# Patient Record
Sex: MALE | Race: BLACK OR AFRICAN AMERICAN | Employment: FULL TIME | URBAN - METROPOLITAN AREA
[De-identification: names, ages, dates, MRNs, and addresses within clinical notes are randomized per-mention and may not be internally consistent; named-entity substitution may affect disease eponyms.]

---

## 2018-07-24 ENCOUNTER — OFFICE VISIT (OUTPATIENT)
Dept: FAMILY MEDICINE CLINIC | Age: 38
End: 2018-07-24

## 2018-07-24 ENCOUNTER — HOSPITAL ENCOUNTER (OUTPATIENT)
Dept: LAB | Age: 38
Discharge: HOME | End: 2018-07-24

## 2018-07-24 VITALS
BODY MASS INDEX: 29.06 KG/M2 | HEART RATE: 72 BPM | TEMPERATURE: 98 F | RESPIRATION RATE: 14 BRPM | WEIGHT: 203 LBS | DIASTOLIC BLOOD PRESSURE: 82 MMHG | SYSTOLIC BLOOD PRESSURE: 116 MMHG | HEIGHT: 70 IN | OXYGEN SATURATION: 98 %

## 2018-07-24 DIAGNOSIS — G47.30 SLEEP APNEA, UNSPECIFIED TYPE: ICD-10-CM

## 2018-07-24 DIAGNOSIS — Z83.3 FAMILY HISTORY OF DIABETES MELLITUS: ICD-10-CM

## 2018-07-24 DIAGNOSIS — Z00.00 ROUTINE MEDICAL EXAM: Primary | ICD-10-CM

## 2018-07-24 DIAGNOSIS — Z13.1 SCREENING FOR DIABETES MELLITUS: ICD-10-CM

## 2018-07-24 DIAGNOSIS — Z13.220 SCREENING CHOLESTEROL LEVEL: ICD-10-CM

## 2018-07-24 PROCEDURE — 99001 SPECIMEN HANDLING PT-LAB: CPT | Performed by: FAMILY MEDICINE

## 2018-07-24 NOTE — PROGRESS NOTES
Chief Complaint   Patient presents with    Physical    Other     h/o elevated CPK    Allergic Rhinitis     TDAP: UTD per patient    1. Have you been to the ER, urgent care clinic since your last visit? Hospitalized since your last visit? No    2. Have you seen or consulted any other health care providers outside of the [address] [place] [address] since your last visit? Include any pap smears or colon screening.  No

## 2018-07-24 NOTE — PROGRESS NOTES
Chief Complaint   Patient presents with    Physical    Other     h/o elevated CPK    Allergic Rhinitis     Subjective:   Su Andujar is a 41 y.o. male presenting for his annual checkup. ROS:  Feeling well. No dyspnea or chest pain on exertion. No abdominal pain, change in bowel habits, black or bloody stools. No urinary tract or prostatic symptoms. No neurological complaints. No myalgias. Has a desk job mainly. Specific concerns today:   No concerns. No longer on meds. Says that prior symptoms and work-up from 2016 have resolved essentially. Current Outpatient Prescriptions   Medication Sig Dispense Refill    cpap machine kit by Does Not Apply route.  fluticasone (FLONASE) 50 mcg/actuation nasal spray 2 Sprays by Both Nostrils route daily. 1 Bottle 11     No Known Allergies  Past Medical History:   Diagnosis Date    Allergic rhinitis     Body mass index (BMI) of 25.0 to 29.9     DJD (degenerative joint disease), cervical 2016    MRI confirmed, foramenal stenosis     History of echocardiogram 07/22/2014    EF 60%. No RWMA. RVSP 25 mmHg.  Left lower lobe pneumonia ([facility] [address] 2013    Sleep apnea     CPAP use at times     History reviewed. No pertinent surgical history.   Family History   Problem Relation Age of Onset    Hypertension Maternal Grandmother     Diabetes Maternal Grandmother     Breast Cancer Maternal Grandmother     Other Mother      DVT     Social History   Substance Use Topics    Smoking status: Former Smoker     Packs/day: 0.10     Years: 15.00     Types: Cigarettes    Smokeless tobacco: Never Used      Comment: 2007    Alcohol use 2.0 - 2.5 oz/week     4 - 5 Standard drinks or equivalent per week      Objective:     Visit Vitals    /82 (BP 1 Location: Left arm, BP Patient Position: Sitting)    Pulse 72    Temp 98 °F (36.7 °C) (Oral)    Resp 14    Ht 5' 10\" (1.778 m)    Wt 203 lb (92.1 kg)    SpO2 98%    BMI 29.13 kg/m2     The patient appears well, alert, oriented x 3, in no distress. ENT normal.  Neck supple. No adenopathy or thyromegaly. RACHAEL. Lungs are clear, good air entry, no wheezes, rhonchi or rales. S1 and S2 normal, no murmurs, regular rate and rhythm. Abdomen is soft without tenderness, guarding, mass or organomegaly. Extremities show no edema, normal peripheral pulses. Neurological is normal without focal findings. Psych: normal affect. Mood good. Oriented x 3. Judgement and insight intact. Assessment/Plan:       ICD-10-CM ICD-9-CM    1. Routine medical exam Z00.00 V70.0    2. Screening cholesterol level Z13.220 V77.91 LIPID PANEL   3. Sleep apnea, unspecified type G47.30 780.57    4. Screening for diabetes mellitus Z13.1 V77.1 GLUCOSE, RANDOM   5. Family history of diabetes mellitus Z83.3 V18.0 GLUCOSE, RANDOM   6. Body mass index (BMI) of 25.0 to 29.9 E66.3 278.02 GLUCOSE, RANDOM     Age and sex specific counseling. Pt ed handout. Screening labs ordered. Diet and exercise for BMI>25. All chart history elements were reviewed by me at the time of the visit even though marked at time of note closure. Patient understands our medical plan. Patient has provided input and agrees with goals. Alternatives have been explained and offered. All questions answered. The patient is to call if condition worsens or fails to improve. Follow-up Disposition:  Return in about 1 year (around 7/24/2019) for physical. Labs due today.

## 2018-07-24 NOTE — MR AVS SNAPSHOT
[facility] [address] [facility] 
[phone] Patient: Haroldo Solis MRN: [id number] KER:[id number] Visit Information Date & Time Provider Department Dept. Phone Encounter #  
 7/24/2018 10:45 AM Vanessa Schreiber, [address] [id number] Follow-up Instructions Return in about 1 year (around 7/24/2019) for physical. Labs due today. Upcoming Health Maintenance Date Due DTaP/Tdap/Td series (1 - Tdap) 3/27/2002 Influenza Age 5 to Adult 8/1/2018 Allergies as of 7/24/2018  Review Complete On: 7/24/2018 By: Mike Martínez LPN No Known Allergies Current Immunizations  Reviewed on 10/12/2016 No immunizations on file. Not reviewed this visit You Were Diagnosed With   
  
 Codes Comments Screening cholesterol level    -  Primary ICD-10-CM: K71.116 ICD-9-CM: V77.91 Sleep apnea, unspecified type     ICD-10-CM: G47.30 ICD-9-CM: 780.57 Routine medical exam     ICD-10-CM: Z00.00 ICD-9-CM: V70.0 Screening for diabetes mellitus     ICD-10-CM: Z13.1 ICD-9-CM: V77.1 Family history of diabetes mellitus     ICD-10-CM: Z83.3 ICD-9-CM: V18.0 Body mass index (BMI) of 25.0 to 29.9     ICD-10-CM: E66.3 ICD-9-CM: 278.02 Vitals BP Pulse Temp Resp Height(growth percentile) Weight(growth percentile) 116/82 (BP 1 Location: Left arm, BP Patient Position: Sitting) 72 98 °F (36.7 °C) (Oral) 14 5' 10\" (1.778 m) 203 lb (92.1 kg) SpO2 BMI Smoking Status 98% 29.13 kg/m2 Former Smoker Vitals History BMI and BSA Data Body Mass Index Body Surface Area  
 29.13 kg/m 2 2.13 m 2 Preferred Pharmacy Pharmacy Name Phone [address] [place] [address] [phone] Your Updated Medication List  
  
   
This list is accurate as of 7/24/18 11:12 AM.  Always use your most recent med list.  
  
  
  
  
 cpap machine kit  
by Does Not Apply route.  
  
 ergocalciferol 50,000 unit capsule Commonly known as:  VITAMIN D2 Take 1 Cap by mouth every seven (7) days. fluticasone 50 mcg/actuation nasal spray Commonly known as:  Jennifer Shames 2 Sprays by Both Nostrils route daily. gabapentin 100 mg capsule Commonly known as:  NEURONTIN  
1 twice a day for 2 days then 1 three times a day for three days then 2 three times a day for 7 days then 3 three times a day HYDROcodone-acetaminophen 5-325 mg per tablet Commonly known as:  Yulissa Hernandez Take 1 Tab by Mouth Every 4 Hours As Needed. meloxicam 15 mg tablet Commonly known as:  MOBIC Take 1 Tab by mouth daily (with breakfast). metaxalone 800 mg tablet Commonly known as:  SKELAXIN Take 1 Tab by mouth three (3) times daily. naproxen 375 mg tablet Commonly known as:  NAPROSYN  
375 mg. VALIUM 5 mg tablet Generic drug:  diazePAM  
5 mg. Follow-up Instructions Return in about 1 year (around 7/24/2019) for physical. Labs due today. To-Do List   
 07/24/2018 Lab:  GLUCOSE, RANDOM   
  
 07/24/2018 Lab:  LIPID PANEL Introducing [facility]! Anirudh Zambrano introduces [employer] patient portal. Now you can access parts of your medical record, email your doctor's office, and request medication refills online. 1. In your internet browser, go to [email or web address] 2. Click on the First Time User? Click Here link in the Sign In box. You will see the New Member Sign Up page. 3. Enter your [employer] Access Code exactly as it appears below. You will not need to use this code after youve completed the sign-up process. If you do not sign up before the expiration date, you must request a new code. · [employer] Access Code: [email or web address] Expires: 10/22/2018 11:05 AM 
 
4.  Enter the last four digits of your Social Security Number (xxxx) and Date of Birth (mm/dd/yyyy) as indicated and click Submit. You will be taken to the next sign-up page. 5. Create a 5to1 ID. This will be your 5to1 login ID and cannot be changed, so think of one that is secure and easy to remember. 6. Create a 5to1 password. You can change your password at any time. 7. Enter your Password Reset Question and Answer. This can be used at a later time if you forget your password. 8. Enter your e-mail address. You will receive e-mail notification when new information is available in [address] 9. Click Sign Up. You can now view and download portions of your medical record. 10. Click the Download Summary menu link to download a portable copy of your medical information. If you have questions, please visit the Frequently Asked Questions section of the 5to1 website. Remember, 5to1 is NOT to be used for urgent needs. For medical emergencies, dial 911. Now available from your iPhone and Android! Please provide this summary of care documentation to your next provider. Your primary care clinician is listed as Flynn Graham. If you have any questions after today's visit, please call [phone].

## 2018-07-25 LAB
CHOLEST SERPL-MCNC: 75 MG/DL (ref 100–199)
GLUCOSE SERPL-MCNC: 79 MG/DL (ref 65–99)
HDLC SERPL-MCNC: 41 MG/DL
INTERPRETATION, 910389: NORMAL
LDLC SERPL CALC-MCNC: 17 MG/DL (ref 0–99)
TRIGL SERPL-MCNC: 85 MG/DL (ref 0–149)
VLDLC SERPL CALC-MCNC: 17 MG/DL (ref 5–40)

## 2018-07-26 NOTE — PROGRESS NOTES
[place] [phone] advising patient that labs look great. He was also advised that forms have been faxed and copy has been mailed to him. Patient was asked to call [facility] if he has any questions.

## 2019-09-04 ENCOUNTER — OFFICE VISIT (OUTPATIENT)
Dept: FAMILY MEDICINE CLINIC | Age: 39
End: 2019-09-04

## 2019-09-04 VITALS
WEIGHT: 206 LBS | OXYGEN SATURATION: 97 % | HEIGHT: 70 IN | HEART RATE: 84 BPM | RESPIRATION RATE: 16 BRPM | SYSTOLIC BLOOD PRESSURE: 118 MMHG | BODY MASS INDEX: 29.49 KG/M2 | TEMPERATURE: 98.4 F | DIASTOLIC BLOOD PRESSURE: 82 MMHG

## 2019-09-04 DIAGNOSIS — Z13.1 SCREENING FOR DIABETES MELLITUS: ICD-10-CM

## 2019-09-04 DIAGNOSIS — Z83.3 FAMILY HISTORY OF DIABETES MELLITUS: ICD-10-CM

## 2019-09-04 DIAGNOSIS — Z00.00 ROUTINE MEDICAL EXAM: Primary | ICD-10-CM

## 2019-09-04 DIAGNOSIS — Z63.4 BEREAVEMENT: ICD-10-CM

## 2019-09-04 DIAGNOSIS — G47.30 SLEEP APNEA, UNSPECIFIED TYPE: ICD-10-CM

## 2019-09-04 DIAGNOSIS — Z13.220 SCREENING CHOLESTEROL LEVEL: ICD-10-CM

## 2019-09-04 SDOH — SOCIAL STABILITY - SOCIAL INSECURITY: DISSAPEARANCE AND DEATH OF FAMILY MEMBER: Z63.4

## 2019-09-04 NOTE — PROGRESS NOTES
1. Have you been to the ER, urgent care clinic since your last visit? Hospitalized since your last visit? No    2. Have you seen or consulted any other health care providers outside of the [address] [place] [address] since your last visit? Include any pap smears or colon screening.  No

## 2019-09-04 NOTE — PATIENT INSTRUCTIONS
Grief (Actual/Anticipated): Care Instructions  Your Care Instructions    Grief is your emotional reaction to a major loss. The words \"sorrow\" and \"heartache\" often are used to describe feelings of grief. You feel grief when you lose a beloved person, pet, place, or thing. It is also natural to feel grief when you lose a valued way of life, such as a job, marriage, or good health. You may begin to grieve before a loss occurs. You may grieve for a loved one who is sick and dying. Children and adults often feel the pain of loss before a big move or divorce. This type of grief helps you get ready for a loss. Grief is different for each person. There is no \"normal\" or \"expected\" period of time for grieving. Some people adjust to their loss within a couple of months. Others may take 2 years or longer, especially if their lives were changed a lot or if the loss was sudden and shocking. Grieving can cause problems such as headaches, loss of appetite, and trouble with thinking or sleeping. You may withdraw from friends and family and behave in ways that are unusual for you. Grief may cause you to question your beliefs and views about life. Grief is natural and does not require medical treatment. But if you have trouble sleeping, it may help to take sleeping pills for a short time. It may help to talk with people who have been through or are going through similar losses. You may also want to talk to a counselor about your feelings. Talking about your loss, sharing your cares and concerns, and getting support from others are important parts of healthy grieving. Follow-up care is a key part of your treatment and safety. Be sure to make and go to all appointments, and call your doctor if you are having problems. It's also a good idea to know your test results and keep a list of the medicines you take. How can you care for yourself at home? · Get enough sleep. Your mind helps make sense of your life while you sleep. Missing sleep can lead to illness and make it harder for you to deal with your grief. · Eat healthy foods. Try to avoid eating only foods that give you comfort. Ask someone to join you for a meal if you do not like eating alone. Consider taking a multivitamin every day. · Get some exercise every day. Even a walk can help you deal with your grief. Other exercises, such as yoga, can also help you manage stress. · Comfort yourself. Take time to look at photos or use special items that make you feel better. · Stay involved in your life. Do not withdraw from the activities you enjoy. People you know at work, Islam, clubs, or other groups can help you get through your period of grief. · Think about joining a support group to help you deal with your grief. There are many support groups to help people recover from grief. When should you call for help? Call 911 anytime you think you may need emergency care. For example, call if:    · You feel you cannot stop from hurting yourself or someone else.    Watch closely for changes in your health, and be sure to contact your doctor if:    · You think you may be depressed.     · You do not get better as expected. Where can you learn more? Go to http://darren-anaid.info/. Enter H249 in the search box to learn more about \"Grief (Actual/Anticipated): Care Instructions. \"  Current as of: April 1, 2019  Content Version: 12.1  © 3706-8611 Healthwise, Incorporated. Care instructions adapted under license by [employer] (which disclaims liability or warranty for this information). If you have questions about a medical condition or this instruction, always ask your healthcare professional. [place] [address] any warranty or liability for your use of this information. Well Visit, Ages 25 to 48: Care Instructions  Your Care Instructions    Physical exams can help you stay healthy.  Your doctor has checked your overall health and may have suggested ways to take good care of yourself. He or she also may have recommended tests. At home, you can help prevent illness with healthy eating, regular exercise, and other steps. Follow-up care is a key part of your treatment and safety. Be sure to make and go to all appointments, and call your doctor if you are having problems. It's also a good idea to know your test results and keep a list of the medicines you take. How can you care for yourself at home? · Reach and stay at a healthy weight. This will lower your risk for many problems, such as obesity, diabetes, heart disease, and high blood pressure. · Get at least 30 minutes of physical activity on most days of the week. Walking is a good choice. You also may want to do other activities, such as running, swimming, cycling, or playing tennis or team sports. Discuss any changes in your exercise program with your doctor. · Do not smoke or allow others to smoke around you. If you need help quitting, talk to your doctor about stop-smoking programs and medicines. These can increase your chances of quitting for good. · Talk to your doctor about whether you have any risk factors for sexually transmitted infections (STIs). Having one sex partner (who does not have STIs and does not have sex with anyone else) is a good way to avoid these infections. · Use birth control if you do not want to have children at this time. Talk with your doctor about the choices available and what might be best for you. · Protect your skin from too much sun. When you're outdoors from 10 a.m. to 4 p.m., stay in the shade or cover up with clothing and a hat with a wide brim. Wear sunglasses that block UV rays. Even when it's cloudy, put broad-spectrum sunscreen (SPF 30 or higher) on any exposed skin. · See a dentist one or two times a year for checkups and to have your teeth cleaned. · Wear a seat belt in the car.   Follow your doctor's advice about when to have certain tests. These tests can spot problems early. For everyone  · Cholesterol. Have the fat (cholesterol) in your blood tested after age 21. Your doctor will tell you how often to have this done based on your age, family history, or other things that can increase your risk for heart disease. · Blood pressure. Have your blood pressure checked during a routine doctor visit. Your doctor will tell you how often to check your blood pressure based on your age, your blood pressure results, and other factors. · Vision. Talk with your doctor about how often to have a glaucoma test.  · Diabetes. Ask your doctor whether you should have tests for diabetes. · Colon cancer. Your risk for colorectal cancer gets higher as you get older. Some experts say that adults should start regular screening at age 48 and stop at age 76. Others say to start before age 48 or continue after age 76. Talk with your doctor about your risk and when to start and stop screening. For women  · Breast exam and mammogram. Talk to your doctor about when you should have a clinical breast exam and a mammogram. Medical experts differ on whether and how often women under 50 should have these tests. Your doctor can help you decide what is right for you. · Cervical cancer screening test and pelvic exam. Begin with a Pap test at age 24. The test often is part of a pelvic exam. Starting at age 27, you may choose to have a Pap test, an HPV test, or both tests at the same time (called co-testing). Talk with your doctor about how often to have testing. · Tests for sexually transmitted infections (STIs). Ask whether you should have tests for STIs. You may be at risk if you have sex with more than one person, especially if your partners do not wear condoms. For men  · Tests for sexually transmitted infections (STIs). Ask whether you should have tests for STIs.  You may be at risk if you have sex with more than one person, especially if you do not wear a condom. · Testicular cancer exam. Ask your doctor whether you should check your testicles regularly. · Prostate exam. Talk to your doctor about whether you should have a blood test (called a PSA test) for prostate cancer. Experts differ on whether and when men should have this test. Some experts suggest it if you are older than 39 and are -American or have a father or brother who got prostate cancer when he was younger than 72. When should you call for help? Watch closely for changes in your health, and be sure to contact your doctor if you have any problems or symptoms that concern you. Where can you learn more? Go to http://darren-anaid.info/. Enter P072 in the search box to learn more about \"Well Visit, Ages 25 to 48: Care Instructions. \"  Current as of: December 13, 2018  Content Version: 12.1  © 2537-8609 Healthwise, Incorporated. Care instructions adapted under license by [employer] (which disclaims liability or warranty for this information). If you have questions about a medical condition or this instruction, always ask your healthcare professional. [place] [address] any warranty or liability for your use of this information.     [address]  [place], [address]  [phone]

## 2019-09-04 NOTE — PROGRESS NOTES
Chief Complaint   Patient presents with    Physical     Subjective:   Cici Caldwell is a 46 y.o. male presenting for his annual checkup. ROS:  Feeling well. No dyspnea or chest pain on exertion. No abdominal pain, change in bowel habits, black or bloody stools. No urinary tract or prostatic symptoms. No neurological complaints. No myalgias. Has a desk job mainly. Specific concerns today:   No concerns besides some bereavement over the loss of family members, mainly his step father. He has thought about counseling. It is not as bad as before. No harmful ideations. Not interested in medications. Has been consuming more regular alcohol than before. Was a weekend drinker for fun and now consumes weekdays a few drinks. Current Outpatient Medications   Medication Sig Dispense Refill    fluticasone (FLONASE) 50 mcg/actuation nasal spray 2 Sprays by Both Nostrils route daily. 1 Bottle 11    cpap machine kit by Does Not Apply route. No Known Allergies  Past Medical History:   Diagnosis Date    Allergic rhinitis     Body mass index (BMI) of 25.0 to 29.9     DJD (degenerative joint disease), cervical 2016    MRI confirmed, foramenal stenosis     History of echocardiogram 07/22/2014    EF 60%. No RWMA. RVSP 25 mmHg.  Left lower lobe pneumonia ([address] 2013    Sleep apnea     CPAP use at times     History reviewed. No pertinent surgical history.   Family History   Problem Relation Age of Onset    Hypertension Maternal Grandmother     Diabetes Maternal Grandmother     Breast Cancer Maternal Grandmother     Other Mother         DVT     Social History     Tobacco Use    Smoking status: Former Smoker     Packs/day: 0.10     Years: 15.00     Pack years: 1.50     Types: Cigarettes    Smokeless tobacco: Never Used    Tobacco comment: 2007   Substance Use Topics    Alcohol use: Yes     Frequency: 4 or more times a week     Drinks per session: 1 or 2      Objective:     Visit Vitals  BP 118/82 (BP 1 Location: Left arm, BP Patient Position: Sitting)   Pulse 84   Temp 98.4 °F (36.9 °C) (Oral)   Resp 16   Ht 5' 10\" (1.778 m)   Wt 206 lb (93.4 kg)   SpO2 97%   BMI 29.56 kg/m²     The patient appears well, alert, oriented x 3, in no distress. ENT normal.  Neck supple. No adenopathy or thyromegaly. RACHAEL. Lungs are clear, good air entry, no wheezes, rhonchi or rales. S1 and S2 normal, no murmurs, regular rate and rhythm. Abdomen is soft without tenderness, guarding, mass or organomegaly. Extremities show no edema, normal peripheral pulses. Neurological is normal without focal findings. Psych: normal affect. Mood good. Oriented x 3. Judgement and insight intact. Assessment/Plan:       ICD-10-CM ICD-9-CM    1. Routine medical exam Z00.00 V70.0    2. Screening cholesterol level Z13.220 V77.91 LIPID PANEL   3. Sleep apnea, unspecified type G47.30 780.57    4. Screening for diabetes mellitus Z13.1 V77.1 GLUCOSE, RANDOM   5. Family history of diabetes mellitus Z83.3 V18.0 GLUCOSE, RANDOM   6. Body mass index (BMI) of 25.0 to 29.9 E66.3 278.02 GLUCOSE, RANDOM   7. Bereavement Z63.4 V62.80      Age and sex specific counseling. Pt ed handout. Screening labs ordered. Diet and exercise for BMI>25. Advised on counseling for bereavement. Pt ed handout. Declines flu vaccine. Says he has Tdap through work. All chart history elements were reviewed by me at the time of the visit even though marked at time of note closure. Patient understands our medical plan. Patient has provided input and agrees with goals. Alternatives have been explained and offered. All questions answered. The patient is to call if condition worsens or fails to improve.      Follow-up and Dispositions    · Return in about 1 year (around 9/4/2020) for physical.

## 2019-09-12 ENCOUNTER — HOSPITAL ENCOUNTER (OUTPATIENT)
Dept: LAB | Age: 39
Discharge: HOME | End: 2019-09-12

## 2019-09-12 LAB — XX-LABCORP SPECIMEN COL,LCBCF: NORMAL

## 2019-09-12 PROCEDURE — 99001 SPECIMEN HANDLING PT-LAB: CPT

## 2019-09-13 LAB
CHOLEST SERPL-MCNC: 81 MG/DL (ref 100–199)
GLUCOSE SERPL-MCNC: 93 MG/DL (ref 65–99)
HDLC SERPL-MCNC: 34 MG/DL
INTERPRETATION, 910389: NORMAL
LDLC SERPL CALC-MCNC: 24 MG/DL (ref 0–99)
SPECIMEN STATUS REPORT, ROLRST: NORMAL
TRIGL SERPL-MCNC: 116 MG/DL (ref 0–149)
VLDLC SERPL CALC-MCNC: 23 MG/DL (ref 5–40)

## 2019-09-13 NOTE — PROGRESS NOTES
Nurse to fill out work forms and fax and provide to patient. Also to advise patient they look overall good.

## 2019-09-16 ENCOUNTER — DOCUMENTATION ONLY (OUTPATIENT)
Dept: FAMILY MEDICINE CLINIC | Age: 39
End: 2019-09-16

## 2019-09-16 NOTE — PROGRESS NOTES
Forms completed 9/13/19 and faxed to employer. Марина Valle [phone] advising patient of normal labs. He was asked to call if there are any questions.

## 2019-09-27 ENCOUNTER — TELEPHONE (OUTPATIENT)
Dept: FAMILY MEDICINE CLINIC | Age: 39
End: 2019-09-27

## 2019-09-27 NOTE — TELEPHONE ENCOUNTER
Pt states that Dr Natasha Abbott had talked to him about anxiety medication and pt didn't want to start on it at that time. He now thinks he would like to try something and would like to know what Dr Natasha Abbott would put him on . Advised that Dr Natasha Abbott was out of the office today and this would be addressed on Monday. Please advise.

## 2019-10-01 NOTE — TELEPHONE ENCOUNTER
Please schedule and we can discuss options. We recommended counseling the last visit since he declined medication. Has he started that?

## 2019-10-02 NOTE — TELEPHONE ENCOUNTER
Pt states that he has set up an appt with [facility] on 12/12/2019 the earliest they had. He would like to know if Dr Zuleika Davey will give him something for his depression and anxiety. Ralph Watts [phone]  Please advise.

## 2019-10-02 NOTE — TELEPHONE ENCOUNTER
Spoke with Parag Cadena, states [facility] location they were not excepting any new patients at the time. He was given [place] location information ([phone]) to see if they had anything and that he will also call back to schedule with Dr. Aayush Mcfadden as well.

## 2019-10-02 NOTE — TELEPHONE ENCOUNTER
Left message for Mary that per Dr. Chong Raymundo an appointment is needed to discuss medication, counseling was recommended since he declined medication at last visit and Dr. Chong Raymundo would like to know Has he started that?

## 2019-10-03 RX ORDER — ESCITALOPRAM OXALATE 5 MG/1
5 TABLET ORAL DAILY
Qty: 30 TAB | Refills: 0 | Status: SHIPPED | OUTPATIENT
Start: 2019-10-03 | End: 2019-10-29 | Stop reason: SDUPTHER

## 2019-10-04 NOTE — TELEPHONE ENCOUNTER
Spoke with Shlomo Hernandez aware that Dr. Ryan Miranda has prescribed Lexapro 5 mg 1 tab daily and to follow up in 2 week.  Appointment scheduled for 10- at 3:30 pm

## 2019-10-04 NOTE — TELEPHONE ENCOUNTER
We can start lexapro 5mg daily. I will send to his pharmacy (or print) a 30 day supply. Please follow-up in 2 weeks to see how it is working and we can adjust from there.

## 2019-10-29 ENCOUNTER — OFFICE VISIT (OUTPATIENT)
Dept: FAMILY MEDICINE CLINIC | Age: 39
End: 2019-10-29

## 2019-10-29 VITALS
OXYGEN SATURATION: 97 % | TEMPERATURE: 97.9 F | HEIGHT: 70 IN | SYSTOLIC BLOOD PRESSURE: 110 MMHG | BODY MASS INDEX: 30.49 KG/M2 | WEIGHT: 213 LBS | DIASTOLIC BLOOD PRESSURE: 78 MMHG | HEART RATE: 75 BPM | RESPIRATION RATE: 14 BRPM

## 2019-10-29 DIAGNOSIS — F33.0 DEPRESSION, MAJOR, RECURRENT, MILD (HCC): Primary | ICD-10-CM

## 2019-10-29 DIAGNOSIS — G47.00 INSOMNIA, UNSPECIFIED TYPE: ICD-10-CM

## 2019-10-29 RX ORDER — TRAZODONE HYDROCHLORIDE 50 MG/1
50 TABLET ORAL
Qty: 30 TAB | Refills: 1 | Status: SHIPPED | OUTPATIENT
Start: 2019-10-29 | End: 2020-02-11 | Stop reason: SDUPTHER

## 2019-10-29 RX ORDER — ESCITALOPRAM OXALATE 5 MG/1
5 TABLET ORAL DAILY
Qty: 30 TAB | Refills: 1 | Status: SHIPPED | OUTPATIENT
Start: 2019-10-29 | End: 2020-01-24 | Stop reason: SDUPTHER

## 2019-10-29 NOTE — PROGRESS NOTES
SUBJECTIVE  Chief Complaint   Patient presents with    Anxiety    Depression     The patient presents complaining of ongoing depressed mood. The patient has had some difficulty concentrating and emotional outbursts. The patient reports their sleep is very poor. The patients appetite is up and down. The patient reports anhedonia. The patient reports they have had other times in their life when they have felt depressed. The patient denies any suicidal or homicidal ideation. Never got the lexapro he called to get started on earlier this month. Says that he did not know which pharmacy it went to. Scheduled for therapy Dec 12th (approx) at [facility]. Denies hallucinations. OBJECTIVE    Blood pressure 110/78, pulse 75, temperature 97.9 °F (36.6 °C), temperature source Oral, resp. rate 14, height 5' 10\" (1.778 m), weight 213 lb (96.6 kg), SpO2 97 %. General:  Alert, cooperative, well appearing, in no apparent distress. Psych: normal affect, his usual calm and restricted affect over the years. Mood good. Oriented x 3. Judgement and insight intact. ASSESSMENT / PLAN    ICD-10-CM ICD-9-CM    1. Depression, unspecified depression type F32.9 311 escitalopram oxalate (LEXAPRO) 5 mg tablet   2. Insomnia, unspecified type G47.00 780.52 traZODone (DESYREL) 50 mg tablet   3. Depression, major, recurrent, mild (HCC) F33.0 296.31      Start Lexapro 5mg daily. Start trazodone 50mg nightly. Advised on counseling. All chart history elements were reviewed by me at the time of the visit even though marked at time of note closure. Patient understands our medical plan. Patient has provided input and agrees with goals. Alternatives have been explained and offered. All questions answered. The patient is to call if condition worsens or fails to improve. Follow-up and Dispositions    · Return in about 2 months (around 12/29/2019) for depression.

## 2019-10-29 NOTE — PATIENT INSTRUCTIONS
A Healthy Lifestyle: Care Instructions Your Care Instructions A healthy lifestyle can help you feel good, stay at a healthy weight, and have plenty of energy for both work and play. A healthy lifestyle is something you can share with your whole family. A healthy lifestyle also can lower your risk for serious health problems, such as high blood pressure, heart disease, and diabetes. You can follow a few steps listed below to improve your health and the health of your family. Follow-up care is a key part of your treatment and safety. Be sure to make and go to all appointments, and call your doctor if you are having problems. It's also a good idea to know your test results and keep a list of the medicines you take. How can you care for yourself at home? · Do not eat too much sugar, fat, or fast foods. You can still have dessert and treats now and then. The goal is moderation. · Start small to improve your eating habits. Pay attention to portion sizes, drink less juice and soda pop, and eat more fruits and vegetables. ? Eat a healthy amount of food. A 3-ounce serving of meat, for example, is about the size of a deck of cards. Fill the rest of your plate with vegetables and whole grains. ? Limit the amount of soda and sports drinks you have every day. Drink more water when you are thirsty. ? Eat at least 5 servings of fruits and vegetables every day. It may seem like a lot, but it is not hard to reach this goal. A serving or helping is 1 piece of fruit, 1 cup of vegetables, or 2 cups of leafy, raw vegetables. Have an apple or some carrot sticks as an afternoon snack instead of a candy bar. Try to have fruits and/or vegetables at every meal. 
· Make exercise part of your daily routine. You may want to start with simple activities, such as walking, bicycling, or slow swimming. Try to be active 30 to 60 minutes every day.  You do not need to do all 30 to 60 minutes all at once. For example, you can exercise 3 times a day for 10 or 20 minutes. Moderate exercise is safe for most people, but it is always a good idea to talk to your doctor before starting an exercise program. 
· Keep moving. Addi Hessys the lawn, work in the garden, or [place]. Take the stairs instead of the elevator at work. · If you smoke, quit. People who smoke have an increased risk for heart attack, stroke, cancer, and other lung illnesses. Quitting is hard, but there are ways to boost your chance of quitting tobacco for good. ? Use nicotine gum, patches, or lozenges. ? Ask your doctor about stop-smoking programs and medicines. ? Keep trying. In addition to reducing your risk of diseases in the future, you will notice some benefits soon after you stop using tobacco. If you have shortness of breath or asthma symptoms, they will likely get better within a few weeks after you quit. · Limit how much alcohol you drink. Moderate amounts of alcohol (up to 2 drinks a day for men, 1 drink a day for women) are okay. But drinking too much can lead to liver problems, high blood pressure, and other health problems. Family health If you have a family, there are many things you can do together to improve your health. · Eat meals together as a family as often as possible. · Eat healthy foods. This includes fruits, vegetables, lean meats and dairy, and whole grains. · Include your family in your fitness plan. Most people think of activities such as jogging or tennis as the way to fitness, but there are many ways you and your family can be more active. Anything that makes you breathe hard and gets your heart pumping is exercise. Here are some tips: 
? Walk to do errands or to take your child to school or the bus. 
? Go for a family bike ride after dinner instead of watching TV. Where can you learn more? Go to http://darren-anaid.info/. Enter U838 in the search box to learn more about \"A Healthy Lifestyle: Care Instructions. \" Current as of: May 28, 2019 Content Version: 12.2 © 2948-0862 [employer]. Care instructions adapted under license by [employer] (which disclaims liability or warranty for this information). If you have questions about a medical condition or this instruction, always ask your healthcare professional. [place] [address] any warranty or liability for your use of this information.

## 2019-10-29 NOTE — PROGRESS NOTES
1. Have you been to the ER, urgent care clinic since your last visit? Hospitalized since your last visit? No    2. Have you seen or consulted any other health care providers outside of the [address] [place] [address] since your last visit? Include any pap smears or colon screening.  No

## 2020-01-24 DIAGNOSIS — F33.0 DEPRESSION, MAJOR, RECURRENT, MILD (HCC): ICD-10-CM

## 2020-01-24 RX ORDER — ESCITALOPRAM OXALATE 5 MG/1
5 TABLET ORAL DAILY
Qty: 21 TAB | Refills: 0 | Status: SHIPPED | OUTPATIENT
Start: 2020-01-24 | End: 2020-02-11 | Stop reason: SDUPTHER

## 2020-01-24 NOTE — TELEPHONE ENCOUNTER
This pharmacy faxed over request for the following prescriptions to be filled:    Medication requested :   Requested Prescriptions     Pending Prescriptions Disp Refills    escitalopram oxalate (LEXAPRO) 5 mg tablet 30 Tab 1     Sig: Take 1 Tab by mouth daily.      PCP: Jimmie Viera [address]. or Print: [facility]  Mail order or Local pharmacy [address]     Scheduled appointment if not seen by current providers in office: LOV 10/29/2019 f/u 2/11/2020

## 2020-02-11 ENCOUNTER — OFFICE VISIT (OUTPATIENT)
Dept: FAMILY MEDICINE CLINIC | Age: 40
End: 2020-02-11

## 2020-02-11 VITALS
BODY MASS INDEX: 30.64 KG/M2 | RESPIRATION RATE: 16 BRPM | OXYGEN SATURATION: 96 % | DIASTOLIC BLOOD PRESSURE: 78 MMHG | HEART RATE: 74 BPM | HEIGHT: 70 IN | TEMPERATURE: 97.6 F | SYSTOLIC BLOOD PRESSURE: 120 MMHG | WEIGHT: 214 LBS

## 2020-02-11 DIAGNOSIS — F33.0 DEPRESSION, MAJOR, RECURRENT, MILD (HCC): Primary | ICD-10-CM

## 2020-02-11 DIAGNOSIS — G47.00 INSOMNIA, UNSPECIFIED TYPE: ICD-10-CM

## 2020-02-11 RX ORDER — ESCITALOPRAM OXALATE 10 MG/1
10 TABLET ORAL DAILY
Qty: 30 TAB | Refills: 5 | Status: SHIPPED | OUTPATIENT
Start: 2020-02-11 | End: 2021-01-19 | Stop reason: SDUPTHER

## 2020-02-11 RX ORDER — TRAZODONE HYDROCHLORIDE 50 MG/1
50 TABLET ORAL
Qty: 30 TAB | Refills: 5 | Status: SHIPPED | OUTPATIENT
Start: 2020-02-11 | End: 2021-03-17 | Stop reason: SDUPTHER

## 2020-02-11 NOTE — PROGRESS NOTES
SUBJECTIVE  Chief Complaint   Patient presents with    Spasms     in back     Depression     The patient presents for follow up of epressed mood. The patient has had improvements in his concentrating and emotional outbursts. The patient reports their sleep is normalized on trazodone. The patients appetite is more consistent. The patient denies anhedonia. He has started and found counseling to be beneficial.  He has noticed a few times when he is anxious still. He has had some increased tightness in his neck muscles described as spasms. OBJECTIVE    Blood pressure 120/78, pulse 74, temperature 97.6 °F (36.4 °C), temperature source Oral, resp. rate 16, height 5' 10\" (1.778 m), weight 214 lb (97.1 kg), SpO2 96 %. General:  Alert, cooperative, well appearing, in no apparent distress. Neck:  Full ROM. Nontender. Heart:  Normal S1S2, RRR  Psych: normal affect, his usual calm and restricted affect over the years. Mood good. Oriented x 3. Judgement and insight intact. ASSESSMENT / PLAN    ICD-10-CM ICD-9-CM    1. Depression, major, recurrent, mild (HCC) F33.0 296.31 escitalopram oxalate (LEXAPRO) 5 mg tablet   2. Insomnia, unspecified type G47.00 780.52 traZODone (DESYREL) 50 mg tablet     Increase to Lexapro 10g daily. Cont trazodone 50mg nightly. Advised on continued counseling. All chart history elements were reviewed by me at the time of the visit even though marked at time of note closure. Patient understands our medical plan. Patient has provided input and agrees with goals. Alternatives have been explained and offered. All questions answered. The patient is to call if condition worsens or fails to improve. Follow-up and Dispositions    · Return in about 6 months (around 8/11/2020) for routine care.

## 2020-02-11 NOTE — PROGRESS NOTES
1. Have you been to the ER, urgent care clinic since your last visit? Hospitalized since your last visit? Yes, [facility] in [place] for strep throat 1 month ago    2. Have you seen or consulted any other health care providers outside of the [address] [place] [address] since your last visit? Include any pap smears or colon screening.  No

## 2020-02-11 NOTE — PATIENT INSTRUCTIONS
A Healthy Lifestyle: Care Instructions  Your Care Instructions    A healthy lifestyle can help you feel good, stay at a healthy weight, and have plenty of energy for both work and play. A healthy lifestyle is something you can share with your whole family. A healthy lifestyle also can lower your risk for serious health problems, such as high blood pressure, heart disease, and diabetes. You can follow a few steps listed below to improve your health and the health of your family. Follow-up care is a key part of your treatment and safety. Be sure to make and go to all appointments, and call your doctor if you are having problems. It's also a good idea to know your test results and keep a list of the medicines you take. How can you care for yourself at home? · Do not eat too much sugar, fat, or fast foods. You can still have dessert and treats now and then. The goal is moderation. · Start small to improve your eating habits. Pay attention to portion sizes, drink less juice and soda pop, and eat more fruits and vegetables. ? Eat a healthy amount of food. A 3-ounce serving of meat, for example, is about the size of a deck of cards. Fill the rest of your plate with vegetables and whole grains. ? Limit the amount of soda and sports drinks you have every day. Drink more water when you are thirsty. ? Eat at least 5 servings of fruits and vegetables every day. It may seem like a lot, but it is not hard to reach this goal. A serving or helping is 1 piece of fruit, 1 cup of vegetables, or 2 cups of leafy, raw vegetables. Have an apple or some carrot sticks as an afternoon snack instead of a candy bar. Try to have fruits and/or vegetables at every meal.  · Make exercise part of your daily routine. You may want to start with simple activities, such as walking, bicycling, or slow swimming. Try to be active 30 to 60 minutes every day. You do not need to do all 30 to 60 minutes all at once.  For example, you can exercise 3 times a day for 10 or 20 minutes. Moderate exercise is safe for most people, but it is always a good idea to talk to your doctor before starting an exercise program.  · Keep moving. Kirti Paulthers the lawn, work in the garden, or [employer]. Take the stairs instead of the elevator at work. · If you smoke, quit. People who smoke have an increased risk for heart attack, stroke, cancer, and other lung illnesses. Quitting is hard, but there are ways to boost your chance of quitting tobacco for good. ? Use nicotine gum, patches, or lozenges. ? Ask your doctor about stop-smoking programs and medicines. ? Keep trying. In addition to reducing your risk of diseases in the future, you will notice some benefits soon after you stop using tobacco. If you have shortness of breath or asthma symptoms, they will likely get better within a few weeks after you quit. · Limit how much alcohol you drink. Moderate amounts of alcohol (up to 2 drinks a day for men, 1 drink a day for women) are okay. But drinking too much can lead to liver problems, high blood pressure, and other health problems. Family health  If you have a family, there are many things you can do together to improve your health. · Eat meals together as a family as often as possible. · Eat healthy foods. This includes fruits, vegetables, lean meats and dairy, and whole grains. · Include your family in your fitness plan. Most people think of activities such as jogging or tennis as the way to fitness, but there are many ways you and your family can be more active. Anything that makes you breathe hard and gets your heart pumping is exercise. Here are some tips:  ? Walk to do errands or to take your child to school or the bus.  ? Go for a family bike ride after dinner instead of watching TV. Where can you learn more? Go to http://darren-anaid.info/. Enter J266 in the search box to learn more about \"A Healthy Lifestyle: Care Instructions. \"  Current as of: May 28, 2019  Content Version: 12.2  © 2344-0163 [employer]. Care instructions adapted under license by [employer] (which disclaims liability or warranty for this information). If you have questions about a medical condition or this instruction, always ask your healthcare professional. [address] any warranty or liability for your use of this information.

## 2020-03-25 ENCOUNTER — TELEPHONE (OUTPATIENT)
Dept: FAMILY MEDICINE CLINIC | Age: 40
End: 2020-03-25

## 2020-03-25 NOTE — TELEPHONE ENCOUNTER
Patient went to [facility] today due to muscle spasms and headache. Was given some medication and he wants to know if it is ok to take with medications he is already on. Checked faxes while patient was on the phone, but we have not received anything from [facility] yet. Nurse will try to obtain note and call patient back.

## 2020-03-26 NOTE — TELEPHONE ENCOUNTER
Jocy Tapia [phone] advising patient that per Dr. Scotty Valencia medications are safe for short term use.

## 2020-08-12 ENCOUNTER — VIRTUAL VISIT (OUTPATIENT)
Dept: FAMILY MEDICINE CLINIC | Age: 40
End: 2020-08-12

## 2020-08-12 DIAGNOSIS — G47.00 INSOMNIA, UNSPECIFIED TYPE: ICD-10-CM

## 2020-08-12 DIAGNOSIS — F33.0 DEPRESSION, MAJOR, RECURRENT, MILD (HCC): Primary | ICD-10-CM

## 2020-12-02 ENCOUNTER — TELEPHONE (OUTPATIENT)
Dept: FAMILY MEDICINE CLINIC | Age: 40
End: 2020-12-02

## 2020-12-02 NOTE — TELEPHONE ENCOUNTER
Pt called stating he is having muscle spasms and the pain is unbearable. Pt would like to discuss other options for treatment. Please advise.

## 2020-12-03 ENCOUNTER — HOSPITAL ENCOUNTER (OUTPATIENT)
Dept: GENERAL RADIOLOGY | Age: 40
Discharge: HOME | End: 2020-12-03
Payer: COMMERCIAL

## 2020-12-03 ENCOUNTER — VIRTUAL VISIT (OUTPATIENT)
Dept: FAMILY MEDICINE CLINIC | Age: 40
End: 2020-12-03
Payer: COMMERCIAL

## 2020-12-03 DIAGNOSIS — M54.16 LUMBAR RADICULITIS: ICD-10-CM

## 2020-12-03 DIAGNOSIS — M54.16 LUMBAR RADICULITIS: Primary | ICD-10-CM

## 2020-12-03 PROCEDURE — 99213 OFFICE O/P EST LOW 20 MIN: CPT | Performed by: FAMILY MEDICINE

## 2020-12-03 PROCEDURE — 72100 X-RAY EXAM L-S SPINE 2/3 VWS: CPT

## 2020-12-03 RX ORDER — METHYLPREDNISOLONE 4 MG/1
TABLET ORAL
COMMUNITY
Start: 2020-12-02 | End: 2020-12-08

## 2020-12-03 RX ORDER — CYCLOBENZAPRINE HCL 5 MG
5 TABLET ORAL
COMMUNITY
Start: 2020-12-02 | End: 2020-12-08 | Stop reason: SDUPTHER

## 2020-12-03 NOTE — PROGRESS NOTES
1. Have you been to the ER, urgent care clinic since your last visit? Hospitalized since your last visit? Yes Where: [facility] [address] 12/2/2020    2. Have you seen or consulted any other health care providers outside of the [address] [place] [address] since your last visit? Include any pap smears or colon screening.  No

## 2020-12-03 NOTE — PATIENT INSTRUCTIONS
Learning About How to Have a Healthy Back  What causes back pain? Back pain is often caused by overuse, strain, or injury. For example, people often hurt their backs playing sports or working in the yard, being jolted in a car accident, or lifting something too heavy. Aging plays a part too. Your bones and muscles tend to lose strength as you age, which makes injury more likely. The spongy discs between the bones of the spine (vertebrae) may suffer from wear and tear and no longer provide enough cushion between the bones. A disc that bulges or breaks open (herniated disc) can press on nerves, causing back pain. In some people, back pain is the result of arthritis, broken vertebrae caused by bone loss (osteoporosis), illness, or a spine problem. Although most people have back pain at one time or another, there are steps you can take to make it less likely. How can you have a healthy back? Reduce stress on your back through good posture   Slumping or slouching alone may not cause low back pain. But after the back has been strained or injured, bad posture can make pain worse. · Sleep in a position that maintains your back's normal curves and on a mattress that feels comfortable. Sleep on your side with a pillow between your knees, or sleep on your back with a pillow under your knees. These positions can reduce strain on your back. · Stand and sit up straight. \"Good posture\" generally means your ears, shoulders, and hips are in a straight line. · If you must stand for a long time, put one foot on a stool, ledge, or box. Switch feet every now and then. · Sit in a chair that is low enough to let you place both feet flat on the floor with both knees nearly level with your hips. If your chair or desk is too high, use a footrest to raise your knees. Place a small pillow, a rolled-up towel, or a lumbar roll in the curve of your back if you need extra support.   · Try a kneeling chair, which helps tilt your hips forward. This takes pressure off your lower back. · Try sitting on an exercise ball. It can rock from side to side, which helps keep your back loose. · When driving, keep your knees nearly level with your hips. Sit straight, and drive with both hands on the steering wheel. Your arms should be in a slightly bent position. Reduce stress on your back through careful lifting   · Squat down, bending at the hips and knees only. If you need to, put one knee to the floor and extend your other knee in front of you, bent at a right angle (half kneeling). · Press your chest straight forward. This helps keep your upper back straight while keeping a slight arch in your low back. · Hold the load as close to your body as possible, at the level of your belly button (navel). · Use your feet to change direction, taking small steps. · Lead with your hips as you change direction. Keep your shoulders in line with your hips as you move. · Set down your load carefully, squatting with your knees and hips only. Exercise and stretch your back   · Do some exercise on most days of the week, if your doctor says it is okay. You can walk, run, swim, or cycle. · Stretch your back muscles. Here are a few exercises to try:  ? Lie on your back, and gently pull one bent knee to your chest. Put that foot back on the floor, and then pull the other knee to your chest.  ? Do pelvic tilts. Lie on your back with your knees bent. Tighten your stomach muscles. Pull your belly button (navel) in and up toward your ribs. You should feel like your back is pressing to the floor and your hips and pelvis are slightly lifting off the floor. Hold for 6 seconds while breathing smoothly. ? Sit with your back flat against a wall. · Keep your core muscles strong. The muscles of your back, belly (abdomen), and buttocks support your spine. ? Pull in your belly and imagine pulling your navel toward your spine. Hold this for 6 seconds, then relax.  Remember to keep breathing normally as you tense your muscles. ? Do curl-ups. Always do them with your knees bent. Keep your low back on the floor, and curl your shoulders toward your knees using a smooth, slow motion. Keep your arms folded across your chest. If this bothers your neck, try putting your hands behind your neck (not your head), with your elbows spread apart. ? Lie on your back with your knees bent and your feet flat on the floor. Tighten your belly muscles, and then push with your feet and raise your buttocks up a few inches. Hold this position 6 seconds as you continue to breathe normally, then lower yourself slowly to the floor. Repeat 8 to 12 times. ? If you like group exercise, try Pilates or yoga. These classes have poses that strengthen the core muscles. Lead a healthy lifestyle   · Stay at a healthy weight to avoid strain on your back. · Do not smoke. Smoking increases the risk of osteoporosis, which weakens the spine. If you need help quitting, talk to your doctor about stop-smoking programs and medicines. These can increase your chances of quitting for good. Where can you learn more? Go to http://www.uBeam.com/  Enter L315 in the search box to learn more about \"Learning About How to Have a Healthy Back. \"  Current as of: March 2, 2020               Content Version: 12.6  © 7299-3008 [employer]. Care instructions adapted under license by [employer] (which disclaims liability or warranty for this information). If you have questions about a medical condition or this instruction, always ask your healthcare professional. [place] [address] any warranty or liability for your use of this information.

## 2020-12-03 NOTE — LETTER
Deborah Alvarez was seen and treated in our office. Please allow for patient to be off of work this week, returning Monday Dec 7th, 2020. Thanks, Nick Diaz MD

## 2020-12-03 NOTE — PROGRESS NOTES
Clau Martínez, who was evaluated through a synchronous (real-time) audio-video encounter, and/or his healthcare decision maker, is aware that it is a billable service, with coverage as determined by his insurance carrier. He provided verbal consent to proceed: Yes, and patient identification was verified. It was conducted pursuant to the emergency declaration under the [facility], [address] [address] and the [employer] and [employer] Act. A caregiver was present when appropriate. Ability to conduct physical exam was limited. I was in the office. The patient was at home. SUBJECTIVE  Chief Complaint   Patient presents with    Spasms     upper back and bilateral legs; [facility] visit yesterday and prescribed Prednisone dose pack and Flexeril; meds help patient to sleep but when he wakes up he is still in pain     Patient presents complaining of a few day history of mainly right sided radicular pains from his lateral hip to the back of his leg to his knee but not beyond. He has had chronic intermittent low back and mid-back pain. Says that his mid-thoracic pains and tightness seems to have flared up as well. He was seeing neurology a few years back for paresthesias and similar pains. He has had benefit from formal PT. He cannot identify any recent injuries. He says he went to [facility] as his pain started in his right LE. He says that he has had no weakness but the pain was so sharp once that his leg gave out at work. He was prescribed prednisone and flexeril which he started yesterday. He says that aleve taken 1 pill in the morning takes away his leg symptoms. ROS:  History obtained from the patient   · General: negative for - fever  · Gastrointestinal: no abdominal pain  · Musculoskeletal: no other body aches  · The patient denies any numbness, tingling, or weakness radiating into the lower extremity.     · :  The patient denies any bowel or bladder dysfunction. OBJECTIVE    General:  alert, cooperative, well appearing, in no apparent distress. Psych: normal affect. Mood good. Oriented x 3. Judgement and insight intact. ASSESSMENT / PLAN    ICD-10-CM ICD-9-CM    1. Lumbar radiculitis  M54.16 724.4 XR SPINE LUMB 2 OR 3 V     Start with x-rays. Complete course of corticosteroid and muscle relaxant. Asks for work excuse through the rest of the week. Opts for formal PT. All chart history elements were reviewed by me at the time of the visit even though marked at time of note closure. Patient understands our medical plan. Patient has provided input and agrees with goals. Alternatives have been explained and offered. All questions answered. The patient is to call if condition worsens or fails to improve. RTC for routine follow-up for depression and for this in 4-6 weeks.

## 2020-12-03 NOTE — LETTER
NOTIFICATION RETURN TO WORK  
 
12/3/2020 9:32 AM 
 
Mr. Clau Martínez [address] [facility] [id number] To Whom It May Concern: 
 
Kelly Sheikh was seen and treated in our office. Please allow for patient to be off of work this week, returning Monday Dec 7th, 2020. Sincerely, Rik Wallis MD

## 2020-12-04 ENCOUNTER — TELEPHONE (OUTPATIENT)
Dept: FAMILY MEDICINE CLINIC | Age: 40
End: 2020-12-04

## 2020-12-04 NOTE — TELEPHONE ENCOUNTER
Piotr Farooq [phone] advising patient that letter has been written and will be available for  12/7/2020. If any further paperwork is required a virtual is required and paperwork must be received by FP.

## 2020-12-04 NOTE — TELEPHONE ENCOUNTER
Pt called and stated he would like a letter to extend his leave for two more weeks and his first appt for PT for this Thursday the 10th. Please call pt at your earliest convenience.

## 2020-12-04 NOTE — TELEPHONE ENCOUNTER
Nurse to write letter. If any paperwork other than a letter needs to get filled out, I need to see him virtually with the paperwork in our possession. Letter okay without visit.

## 2020-12-05 ENCOUNTER — PATIENT MESSAGE (OUTPATIENT)
Dept: FAMILY MEDICINE CLINIC | Age: 40
End: 2020-12-05

## 2020-12-08 ENCOUNTER — VIRTUAL VISIT (OUTPATIENT)
Dept: FAMILY MEDICINE CLINIC | Age: 40
End: 2020-12-08
Payer: COMMERCIAL

## 2020-12-08 DIAGNOSIS — M54.16 LUMBAR RADICULITIS: ICD-10-CM

## 2020-12-08 DIAGNOSIS — M47.816 OSTEOARTHRITIS OF LUMBAR SPINE, UNSPECIFIED SPINAL OSTEOARTHRITIS COMPLICATION STATUS: Primary | ICD-10-CM

## 2020-12-08 PROCEDURE — 99213 OFFICE O/P EST LOW 20 MIN: CPT | Performed by: FAMILY MEDICINE

## 2020-12-08 RX ORDER — MELOXICAM 15 MG/1
15 TABLET ORAL DAILY
Qty: 30 TAB | Refills: 0 | Status: SHIPPED | OUTPATIENT
Start: 2020-12-08 | End: 2021-01-06

## 2020-12-08 RX ORDER — CYCLOBENZAPRINE HCL 10 MG
10 TABLET ORAL
Qty: 30 TAB | Refills: 0 | Status: SHIPPED | OUTPATIENT
Start: 2020-12-08 | End: 2020-12-17 | Stop reason: SDUPTHER

## 2020-12-08 NOTE — PROGRESS NOTES
Linwood Crabtree, who was evaluated through a synchronous (real-time) audio-video encounter, and/or his healthcare decision maker, is aware that it is a billable service, with coverage as determined by his insurance carrier. He provided verbal consent to proceed: Yes, and patient identification was verified. It was conducted pursuant to the emergency declaration under the [facility], [address] [place] [address] and the [employer] Act. A caregiver was present when appropriate. Ability to conduct physical exam was limited. I was in the office. The patient was at home. SUBJECTIVE  Chief Complaint   Patient presents with    Form Completion     FMLA    LOW BACK PAIN     pain radiates to bilateral legs     Patient presents for follow-up of low back pain. He has had chronic intermittent pain. Says his pain did improve some on meds. Has PT scheduled on 12/10/2020. He has had his x-rays done. Painful activities include any vigorous activities. He has pain that is a dull baseline and constant but he gets relief with flexeril since it helps him sleep. ROS:  History obtained from the patient   · Gastrointestinal: no abdominal pain, N/V.  · Musculoskeletal: no other body aches  · Neuro: The patient denies any numbness, tingling, or weakness radiating into the lower extremity other than the one buckling episode where his leg gave out. .    · :  The patient denies any bowel or bladder dysfunction. OBJECTIVE    General:  alert, cooperative, well appearing, in no apparent distress. Psych: normal affect. Mood good. Oriented x 3. Judgement and insight intact. Study Result     EXAMINATION: Lumbar spine 3 views     INDICATION: Lumbar radiculopathy     COMPARISON: None     FINDINGS: 3 views of the lumbar spine obtained. Vertebral body heights  preserved.  Suspected L5 pars defects with L5-S1 trace anterolisthesis and mild  disc space loss with endplate spurring. Probable foraminal stenoses at L5-S1. Lumbar lordosis maintained. No definite acute fracture identified.     IMPRESSION  IMPRESSION:     Suspected bilateral L5 pars defects with L5-S1 trace anterolisthesis and  degenerative disc disease. No definite acute findings. Imaging     XR SPINE LUMB 2 OR 3 V (Order: [id number]) - 12/3/2020       ASSESSMENT / PLAN    ICD-10-CM ICD-9-CM    1. Osteoarthritis of lumbar spine, unspecified spinal osteoarthritis complication status  S09.554 721.3    2. Lumbar radiculitis  M54.16 724.4      Reviewed x-rays. Filled out FMLA forms. Proceed with formal PT. Refills of meds. Mobic with largest meal daily, same time of day. Advised on GI and renal risks. Flexeril nightly as needed. All chart history elements were reviewed by me at the time of the visit even though marked at time of note closure. Patient understands our medical plan. Patient has provided input and agrees with goals. Alternatives have been explained and offered. All questions answered. The patient is to call if condition worsens or fails to improve. RTC as scheduled.

## 2020-12-08 NOTE — PROGRESS NOTES
1. Have you been to the ER, urgent care clinic since your last visit? Hospitalized since your last visit? No    2. Have you seen or consulted any other health care providers outside of the [address] [place] [address] since your last visit? Include any pap smears or colon screening.  No

## 2020-12-10 ENCOUNTER — HOSPITAL ENCOUNTER (OUTPATIENT)
Dept: PHYSICAL THERAPY | Age: 40
Discharge: HOME | End: 2020-12-10
Payer: COMMERCIAL

## 2020-12-10 PROCEDURE — 97162 PT EVAL MOD COMPLEX 30 MIN: CPT

## 2020-12-10 PROCEDURE — 97110 THERAPEUTIC EXERCISES: CPT

## 2020-12-10 PROCEDURE — 97530 THERAPEUTIC ACTIVITIES: CPT

## 2020-12-10 NOTE — PROGRESS NOTES
[facility]              [address]        [place], [address]   [phone] fax    Plan of Care/ Statement of Necessity for Physical Therapy Services  Patient name: Ronaldo Coats Start of Care: 12/10/2020   Referral source: Abdirahman Cao MD : 1980    Medical Diagnosis: Low back pain [M54.5]  Payor: Jaime Browne / Plan: Didier Hair / Product Type: HMO /  Onset Date:2020    Treatment Diagnosis: Functional Lumbar Instability    Prior Hospitalization: see medical history Provider#: [id number]   Medications: Verified on Patient summary List    Comorbidities: Anxiety, Depression, BMI>30, Arthritis, B Pars Defect L5   Prior Level of Function: Work [employer], Basketball, Exercise, (I) Functional ADLs, (I) Self-Care ADLs     The Plan of Care and following information is based on the information from the initial evaluation. Assessment:    Patient presents with s/s consistent with functional lumbar instability with x-ray imaging significant for suspected bilateral L5 pars defect with L5-S1 trace anterolisthesis. Patient presents with high symptom irritability/severity thus limiting extent of examination able to be performed with prominent apprehension with LE strength assessment with non-specific loss of strength with associated pain. Patient with minimal apprehension with lumbar AROM assessment  Patient has been required to cease work secondary to symptoms with insidious onset 2020.  To emphasize promotion of recruitment and activation of the anterior abdominal and lumbar musculature to improve trunk static and dynamic stability and improve ease with return to functional and work-related tasks and improve ability to return back to exercise.      Patient will continue to benefit from skilled PT services to modify and progress therapeutic interventions, address functional mobility deficits, address ROM deficits, address strength deficits, analyze and address soft tissue restrictions, analyze and cue movement patterns, analyze and modify body mechanics/ergonomics, assess and modify postural abnormalities, address imbalance/dizziness and instruct in home and community integration to attain remaining goals. Key Information:    Gait:                [x]? Normal        []? Abnormal:     Functional Test:  1. SLS: Left SLS  < 2 sec / Right SLS < 2 sec     Active Movements: []? N/A   []? Too acute   []? Other:  ROM % AROM Comments   Forward flexion 40-60 10% limited (+) [place]'s Sign   Extension 20-30 25% limited Lumbar and right posterior thigh pain 7/10   SB right 20-30 50% limited Central lumbar \"tightness\"   SB left 20-30 50% limited        Neuro Screen []? WNL  Dermatome: Diminished right L5 dermatome  Reflexes: 1+ L/R Patellar, 2+ L/R Achilles     Dural Mobility:  SLR Supine: (+) Right (~20 deg; - Ankle Differentiation)     LE MMT      Left Right   Hip Flexion 4 (p!) 2+ (p!)     Extension NT NT     Abduction NT NT     ER NT NT     IR NT NT   Knee Flexion 4+ 4+     Extension 4 3   Ankle Dorsiflexion 5 5     Hallux Ext 5 5     Ankle Eversion 5 5   *Assessed in supine    Evaluation Complexity History MEDIUM  Complexity : 1-2 comorbidities / personal factors will impact the outcome/ POC ; Examination MEDIUM Complexity : 3 Standardized tests and measures addressing body structure, function, activity limitation and / or participation in recreation  ;Presentation MEDIUM Complexity : Evolving with changing characteristics  ; Clinical Decision Making MEDIUM Complexity : FOTO score of 26-74  Overall Complexity Rating: MEDIUM  Problem List: pain affecting function, decrease ROM, decrease strength, edema affecting function, impaired gait/ balance, decrease ADL/ functional abilitiies, decrease activity tolerance, decrease flexibility/ joint mobility and decrease transfer abilities   Treatment Plan may include any combination of the following: Therapeutic exercise, Therapeutic activities, Neuromuscular re-education, Physical agent/modality, Gait/balance training, Manual therapy, Patient education, Self Care training, Functional mobility training, Home safety training and Stair training  Patient / Family readiness to learn indicated by: asking questions, trying to perform skills and interest  Persons(s) to be included in education: patient (P)  Barriers to Learning/Limitations: None  Patient Goal (s): Be able to jump again. Get back to work  Patient Self Reported Health Status: fair  Rehabilitation Potential: good    Short Term Goals: To be accomplished in 3 weeks:  1. Patient will subjectively report full compliance with prescribed HEP. Eval: HEP provided  2. Patient will demonstrate left/right hip flexion MMT 5/5 to improve ease with bed mobility. Eval: Left Hip Flexion MMT 4/5 (pain), Right Hip Flexion MMT 2+/5 (pain)  3. Patient will demonstrate lumbar extension AROM </= 25% limited with pain </= 3/10 to improve ease with overhead reach. Eval: Lumbar Extension AROM = 25% limited pain 7/10)    Long Term Goals: To be accomplished in 6 weeks:  1. Patient will demonstrate a significant functional improvement as demonstrated by a score of >/= 61 on FOTO. Eval: FOTO = 26       2. Patient will demonstrate left/right SLS >/= 20 seconds to improve ease with stair management. Eval: Left SLS < 2 sec / Right SLS < 2 sec  3. Patient will subjectively report pain at worst in last week 5/10 to improve overall quality of life. Eval: Pain at Worst = 10/10    Frequency / Duration: Patient to be seen 2 times per week for 6 weeks.     Patient/ Caregiver education and instruction: Diagnosis, prognosis, self care, activity modification and exercises   [x]  Plan of care has been reviewed with EVERT Boyd, PT 12/10/2020 9:58 AM  ________________________________________________________________________    I certify that the above Therapy Services are being furnished while the patient is under my care. I agree with the treatment plan and certify that this therapy is necessary.     Physician's Signature:____________Date:_________TIME:________    ** Signature, Date and Time must be completed for valid certification **  Please sign and return to [facility]/ Mauri Ellis [address]              [address] [facility] [place], [address] [place]   [phone] fax

## 2020-12-10 NOTE — PROGRESS NOTES
PT DAILY TREATMENT NOTE     Patient Name: Bertha Sandhu  Date:12/10/2020  : 1980  [x]  Patient  Verified  Payor: Lucia Redmond / Plan: Teto Tejada / Product Type: HMO /    In time:500  Out time:540  Total Treatment Time (min): 40  Visit #: 1 of 12    Medicare/BCBS Only   Total Timed Codes (min):  23 1:1 Treatment Time:  40       Treatment Area: Low back pain [M54.5]    Physical Therapy Evaluation - Lumbar    SUBJECTIVE      Any medication changes, allergies to medications, adverse drug reactions, diagnosis change, or new procedure performed?: [x] No    [] Yes (see summary sheet for update)    Subjective functional status/changes:     PLOF: Work Full-Time, Basketball, Exercise, (I) Functional ADLs, (I) Self-Care ADLs  Current Functional Status: Not Working, Unable to workout, Unable to play basketball  Work Hx: [employer] - Off work since 2020  Living Situation: Lives with family  Comorbidities: Anxiety, Depression, BMI>30, Arthritis  Medications: As prescribed by MD    Subjective: Patient presents with chronic intermittent low back pain with recent exacerbation of symptoms 2020. Patient reports mainly right-sided LE radicular pain which extends from the lateral hip along the posterior thigh to the knee. Patietn reports pain which radiates along bilateral LEs without symptoms below the knee. Patient reports N/T in his fingers/toes with patient unable to report time period of onset. Patient denies falls secondary to symptoms. Patient reports a chronic history of intermittent low back and mid-back pain. Patient with receival of formal physical therapy previously with positive benefit. Patient denies recent injury/trauma. Patient reports increase in pain with walking and sitting with inability to exercise secondary to pain. Patient reports sleep disturbances secondary to pain.      Pain Intensity (0-10, VAS): Current 8-9, Worst 10, Best 6    Patient Goals: \"Be able to jump again. Get back to work. \"    EXAMINATION: Lumbar spine 3 views (per MD note 12/8/2020 Dr. Genesis Barajas)  IMPRESSION  Suspected bilateral L5 pars defects with L5-S1 trace anterolisthesis and  degenerative disc disease. No definite acute findings. OBJECTIVE EXAMINATION    BP: 124/88 mmHg  Posture: Symmetrical height of the iliac crest, No observable lateral trunk shift    Gait:  [x] Normal     [] Abnormal:    Functional Test:  1. SLS: Left SLS  < 2 sec / Right SLS < 2 sec    Active Movements: [] N/A   [] Too acute   [] Other:  ROM % AROM Comments   Forward flexion 40-60 10% limited (+) [place]'s Sign   Extension 20-30 25% limited Lumbar and right posterior thigh pain 7/10   SB right 20-30 50% limited Central lumbar \"tightness\"   SB left 20-30 50% limited      Neuro Screen [] WNL  Dermatome: Diminished right L5 dermatome  Reflexes: 1+ L/R Patellar, 2+ L/R Achilles    Dural Mobility:  SLR Supine: (+) Right (~20 deg; - Ankle Differentiation)    LE MMT    Left Right   Hip Flexion 4 (p!) 2+ (p!)    Extension NT NT    Abduction NT NT    ER NT NT    IR NT NT   Knee Flexion 4+ 4+    Extension 4 3   Ankle Dorsiflexion 5 5    Hallux Ext 5 5    Ankle Eversion 5 5   *Assessed in supine       OBJECTIVE    17 min [x]Eval                  []Re-Eval     15 min Therapeutic Exercise:  [x] See flow sheet : Patient educated regarding completion of prescribed HEP and provided with written HEP instructions, Patient educated regarding diagnosis and PT POC   Rationale: increase ROM and increase strength to improve the patients ability to improve ease with functional ADLs    8 min Therapeutic Activity:  [x]  See flow sheet : Review of positional modifications to improve activity tolerance   Rationale: increase ROM and increase strength  to improve the patients ability to improve ease with return to work.            With   [] TE   [] TA   [] neuro   [] other: Patient Education: [x] Review HEP    [] Progressed/Changed HEP based on:   [] positioning   [] body mechanics   [] transfers   [] heat/ice application    [] other:      Other Objective/Functional Measures: See objective above. Pain Level (0-10 scale) post treatment: 8-9    ASSESSMENT/Changes in Function: Patient presents with s/s consistent with functional lumbar instability with x-ray imaging significant for suspected bilateral L5 pars defect with L5-S1 trace anterolisthesis. Patient presents with high symptom irritability/severity thus limiting extent of examination able to be performed with prominent apprehension with LE strength assessment with non-specific loss of strength with associated pain. Patient with minimal apprehension with lumbar AROM assessment  Patient has been required to cease work secondary to symptoms with insidious onset 12/2/2020. To emphasize promotion of recruitment and activation of the anterior abdominal and lumbar musculature to improve trunk static and dynamic stability and improve ease with return to functional and work-related tasks and improve ability to return back to exercise. Patient will continue to benefit from skilled PT services to modify and progress therapeutic interventions, address functional mobility deficits, address ROM deficits, address strength deficits, analyze and address soft tissue restrictions, analyze and cue movement patterns, analyze and modify body mechanics/ergonomics, assess and modify postural abnormalities, address imbalance/dizziness and instruct in home and community integration to attain remaining goals. [x]  See Plan of Care  []  See progress note/recertification  []  See Discharge Summary         Progress towards goals / Updated goals:    Short Term Goals: To be accomplished in 3 weeks:  1. Patient will subjectively report full compliance with prescribed HEP. Eval: HEP provided  2. Patient will demonstrate left/right hip flexion MMT 5/5 to improve ease with bed mobility.   Eval: Left Hip Flexion MMT 4/5 (pain), Right Hip Flexion MMT 2+/5 (pain)  3. Patient will demonstrate lumbar extension AROM </= 25% limited with pain </= 3/10 to improve ease with overhead reach. Eval: Lumbar Extension AROM = 25% limited pain 7/10)    Long Term Goals: To be accomplished in 6 weeks:  1. Patient will demonstrate a significant functional improvement as demonstrated by a score of >/= 61 on FOTO. Eval: FOTO = 26       2. Patient will demonstrate left/right SLS >/= 20 seconds to improve ease with stair management. Eval: Left SLS < 2 sec / Right SLS < 2 sec  3. Patient will subjectively report pain at worst in last week 5/10 to improve overall quality of life.   Eval: Pain at Worst = 10/10      PLAN  [x]  Upgrade activities as tolerated     []  Continue plan of care  []  Update interventions per flow sheet       []  Discharge due to:_  []  Other:_      Jessica Mccarthy, ELIDIA 12/10/2020  9:55 AM    Future Appointments   Date Time Provider Qian Garcia   12/10/2020  5:00 PM [place], [address] [facility]   1/19/2021  8:15 AM Vijay Cheung MD [facility]

## 2020-12-17 ENCOUNTER — PATIENT MESSAGE (OUTPATIENT)
Dept: FAMILY MEDICINE CLINIC | Age: 40
End: 2020-12-17

## 2020-12-17 DIAGNOSIS — M47.816 OSTEOARTHRITIS OF LUMBAR SPINE, UNSPECIFIED SPINAL OSTEOARTHRITIS COMPLICATION STATUS: Primary | ICD-10-CM

## 2020-12-17 RX ORDER — CYCLOBENZAPRINE HCL 10 MG
10 TABLET ORAL
Qty: 30 TAB | Refills: 0 | Status: SHIPPED | OUTPATIENT
Start: 2020-12-17 | End: 2021-01-19

## 2020-12-17 RX ORDER — CYCLOBENZAPRINE HCL 10 MG
10 TABLET ORAL
Qty: 30 TAB | Refills: 0 | OUTPATIENT
Start: 2020-12-17

## 2020-12-17 NOTE — LETTER
1/4/2021 8:38 AM 
 
Mr. Sara Raymundo [address] [address] [address] [address] To Whom It May Concern, Patient states he wants to return to work. That is okay with us assuming his back is better. Sincerely, Nik Saab MD

## 2020-12-17 NOTE — TELEPHONE ENCOUNTER
Cyclobenzaprine 5 mg TID was prescribed by [facility] on 12/2/2020 #30/0 RF.

## 2020-12-17 NOTE — TELEPHONE ENCOUNTER
This was sent in 1 week ago. He should not need a refill. This is for short term use as well. Did he get Rx 1 week ago?

## 2020-12-17 NOTE — TELEPHONE ENCOUNTER
I cannot prescribe both the 5mg and 10mg at the same time. He can take the 10mg at night as needed but should wean off. He can cut those in half and take during the day but sparingly. I will send in the 10mg refill right now so he has enough.

## 2020-12-17 NOTE — TELEPHONE ENCOUNTER
Per patient's [employer] message: Thats the 10mg i take that at night but the 5 g i take in the day for the pain. ..i still cant lift any thing heavy and i wlk with a slight hunch . ..i can manage enough to do household chores but im not 100percent. Skinny Bonilla

## 2020-12-17 NOTE — TELEPHONE ENCOUNTER
He cannot take the medication more than every 8 hours. So please tell him max times per day is three times daily.

## 2020-12-23 ENCOUNTER — HOSPITAL ENCOUNTER (OUTPATIENT)
Dept: PHYSICAL THERAPY | Age: 40
Discharge: HOME | End: 2020-12-23
Payer: COMMERCIAL

## 2020-12-23 PROCEDURE — 97110 THERAPEUTIC EXERCISES: CPT

## 2020-12-23 NOTE — PROGRESS NOTES
PT DAILY TREATMENT NOTE     Patient Name: Madalyn Terrazas  Date:2020  : 1980  [x]  Patient  Verified  Payor: Malcolm Letters / Plan: Akira Santos / Product Type: HMO /    In time: 5:05pm  Out time:5:45pm  Total Treatment Time (min): 40  Visit #: 2 of 12    Medicare/BCBS Only   Total Timed Codes (min):  40 1:1 Treatment Time:  40       Treatment Area: Low back pain [M54.5]    SUBJECTIVE  Pain Level (0-10 scale): 6  Any medication changes, allergies to medications, adverse drug reactions, diagnosis change, or new procedure performed?: [x] No    [] Yes (see summary sheet for update)  Subjective functional status/changes:   [] No changes reported  Patient reports full compliance with HEP following IE. Reports noticing improvements in overall pain levels. OBJECTIVE    40 min Therapeutic Exercise:  [x] See flow sheet :   Rationale: increase ROM and increase strength to improve the patients ability to perform ADL/IADLs with increased ease           With   [x] TE   [] TA   [] neuro   [] other: Patient Education: [x] Review HEP    [] Progressed/Changed HEP based on:   [] positioning   [] body mechanics   [] transfers   [] heat/ice application    [] other:      Pain Level (0-10 scale) post treatment: 6    ASSESSMENT/Changes in Function:     Patient demonstrates poor tolerance to activities introduced to during today's session. Modified activities through positional changes, with frequent verbal and tactile cuing in order to keep therex in tolerable pain free ranges. Minimal therapeutic carryover demonstrated with removal of cuing. No change in symptoms reported upon leaving session.     Patient will continue to benefit from skilled PT services to modify and progress therapeutic interventions, address functional mobility deficits, address ROM deficits, address strength deficits, analyze and address soft tissue restrictions, analyze and cue movement patterns, analyze and modify body mechanics/ergonomics and assess and modify postural abnormalities to attain remaining goals. [x]  See Plan of Care  []  See progress note/recertification  []  See Discharge Summary         Progress towards goals / Updated goals:  Short Term Goals: To be accomplished in 3 weeks:  1. Patient will subjectively report full compliance with prescribed HEP. Eval: HEP provided   Current: Goal Met: Pt reports full compliance with HEP, 12/23/2020     2. Patient will demonstrate left/right hip flexion MMT 5/5 to improve ease with bed mobility. Eval: Left Hip Flexion MMT 4/5 (pain), Right Hip Flexion MMT 2+/5 (pain)  3. Patient will demonstrate lumbar extension AROM </= 25% limited with pain </= 3/10 to improve ease with overhead reach. Eval: Lumbar Extension AROM = 25% limited pain 7/10)     Long Term Goals: To be accomplished in 6 weeks:  1. Patient will demonstrate a significant functional improvement as demonstrated by a score of >/= 61 on FOTO. Eval: FOTO = 26       2. Patient will demonstrate left/right SLS >/= 20 seconds to improve ease with stair management. Eval: Left SLS < 2 sec / Right SLS < 2 sec  3. Patient will subjectively report pain at worst in last week 5/10 to improve overall quality of life.   Eval: Pain at Worst = 10/10    PLAN  [x]  Upgrade activities as tolerated     [x]  Continue plan of care  []  Update interventions per flow sheet       []  Discharge due to:_  []  Other:_      Sherrell Denney DPT 12/23/2020  4:13 PM    Future Appointments   Date Time Provider Qian Garcia   12/23/2020  5:00 PM Mireille Juan DPT [facility]   12/28/2020  5:00 PM Cami Cancel, PTA [facility]   12/30/2020  5:45 PM Teresa Thompson, PT [facility]   1/4/2021  5:00 PM Cami Cancel, PTA [facility]   1/6/2021  5:00 PM Teresa Thompson PT [facility]   1/11/2021  5:00 PM Cami Sepulveda, PTA [facility]   1/13/2021  5:00 PM Teresa Thompson, PT [facility]   1/19/2021  8:15 AM Basilia Rodriges MD [facility]

## 2020-12-28 ENCOUNTER — HOSPITAL ENCOUNTER (OUTPATIENT)
Dept: PHYSICAL THERAPY | Age: 40
Discharge: HOME | End: 2020-12-28
Payer: COMMERCIAL

## 2020-12-28 PROCEDURE — 97110 THERAPEUTIC EXERCISES: CPT

## 2020-12-28 PROCEDURE — 97112 NEUROMUSCULAR REEDUCATION: CPT

## 2020-12-28 NOTE — PROGRESS NOTES
PT DAILY TREATMENT NOTE     Patient Name: Shayy Patel  Date:2020  : 1980  [x]  Patient  Verified  Payor: Gerardo Cordero / Plan: [employer] / Product Type: HMO /    In time:5:05  Out time:5:53  Total Treatment Time (min): 48  Visit #: 3 of 12    Medicare/BCBS Only   Total Timed Codes (min):  38 1:1 Treatment Time:  38       Treatment Area: Low back pain [M54.5]    SUBJECTIVE  Pain Level (0-10 scale): 3  Any medication changes, allergies to medications, adverse drug reactions, diagnosis change, or new procedure performed?: [x] No    [] Yes (see summary sheet for update)  Subjective functional status/changes:   [] No changes reported  Pt reports he is still not able to run or jump because of his pain. OBJECTIVE    Modality rationale: decrease pain to improve the patients ability to decrease difficulty while performing tasks. Min Type Additional Details   10 []  Ice     [x]  heat  []  Ice massage  []  Laser   []  Anodyne Position:sitting  Location:back    [] Skin assessment post-treatment:  []intact []redness- no adverse reaction    []redness  adverse reaction:       23 min Therapeutic Exercise:  [x] See flow sheet :   Rationale: increase ROM and increase strength to improve the patients ability to increase tolerance to activities. 15 min Neuromuscular Re-education:  [x]  See flow sheet :core activation     Rationale: increase ROM, increase strength, improve coordination, improve balance and increase proprioception  to improve the patients ability to increase ease with standing tolerance. With   [] TE   [] TA   [] neuro   [] other: Patient Education: [x] Review HEP    [] Progressed/Changed HEP based on:   [] positioning   [] body mechanics   [] transfers   [] heat/ice application    [] other:      Other Objective/Functional Measures:  lumbar extension AROM = 25% limited pain /10.      Pain Level (0-10 scale) post treatment: 1-2    ASSESSMENT/Changes in Function: Pt has increase trunk flexion with sit to stand, able to correct with VCs. Held on initiating new exercises due to pt reporting a large increase in pain after last therapy session. Patient will continue to benefit from skilled PT services to modify and progress therapeutic interventions, address functional mobility deficits, address ROM deficits, address strength deficits and analyze and address soft tissue restrictions to attain remaining goals. []  See Plan of Care  []  See progress note/recertification  []  See Discharge Summary         Progress towards goals / Updated goals:  Short Term Goals: To be accomplished in 3 weeks:  1. Patient will subjectively report full compliance with prescribed HEP. Eval: HEP provided   Current: Goal Met: Pt reports full compliance with HEP, 12/23/2020   2. Patient will demonstrate left/right hip flexion MMT 5/5 to improve ease with bed mobility. Eval: Left Hip Flexion MMT 4/5 (pain), Right Hip Flexion MMT 2+/5 (pain)  3. Patient will demonstrate lumbar extension AROM </= 25% limited with pain </= 3/10 to improve ease with overhead reach. Eval: Lumbar Extension AROM = 25% limited pain 7/10)  Current: progressing: lumbar extension AROM = 25% limited pain 6/10. 12/28/20     Long Term Goals: To be accomplished in 6 weeks:  1. Patient will demonstrate a significant functional improvement as demonstrated by a score of >/= 61 on FOTO. Eval: FOTO = 26       2. Patient will demonstrate left/right SLS >/= 20 seconds to improve ease with stair management. Eval: Left SLS < 2 sec / Right SLS < 2 sec  3. Patient will subjectively report pain at worst in last week 5/10 to improve overall quality of life.   Eval: Pain at Worst = 10/10  Current: Remains: pain at worst = 10/10. 12/28/20    PLAN  []  Upgrade activities as tolerated     [x]  Continue plan of care  []  Update interventions per flow sheet       []  Discharge due to:_  []  Other:_      Jacquelyn Loss, PTA 12/28/2020  5:02 PM    Future Appointments   Date Time Provider Qian Radha   12/30/2020  5:45 PM Kriss Adams, PT [facility]   1/4/2021  5:00 PM Aubrey Coronado, [place] [facility]   1/6/2021  5:00 PM Blanchard, [address] [facility]   1/11/2021  5:00 PM Aubrey Coronado, [place] [facility]   1/13/2021  5:00 PM Kriss Adams, PT [facility]   1/19/2021  8:15 AM Oxana Olson MD [facility]

## 2020-12-29 NOTE — TELEPHONE ENCOUNTER
Contacted patient and verified identity using name and date of birth (2- identifiers)  Spoke with patient and advised of referral to juan. He verbalized understanding. Advised patient that, unfortunately, due tot he time lapse from original message to now, our schedule is no longer available for that particular date and time. Patient has appointment scheduled for FU on depression and back pain for 1/1/21 @8:15. He will keep this appointment and proceed with Spine appointment for now.

## 2020-12-29 NOTE — TELEPHONE ENCOUNTER
From: Joshua Sherman  To:  Rosalina Silva MD  Sent: 12/17/2020 5:31 PM EST  Subject: Update Medical Information    Good evening i would like to request an appointment for virtual visit

## 2020-12-29 NOTE — TELEPHONE ENCOUNTER
----- Message from Genesis Barajas MD sent at 12/29/2020 10:19 AM EST -----  Regarding: FW: Update Medical Information  Contact: [phone]  I will place a spine referral.  Please let him know we placed the referral.  If he still wants to see me virtually, next available is okay. Please also be sure to politely tell him that we reached out 11 days ago for him to secure the 29th appointment but at this point we have no availability.  ----- Message -----  From: Savannah Yun LPN  Sent: 17/97/6395   9:58 AM EST  To: Genesis Barajas MD  Subject: FW: Update Medical Information                   It looks Esme Lopez sent the message a while ago (11 days) about availability today. Do you want her to be put on today or for another day?  ----- Message -----  From: Carmen Garsia: 12/28/2020  12:13 PM EST  To: [facility]  Subject: RE: Update Medical Information                   Hello i want to request a virtual appt on the 29th of december when he comes back i would have 2 more therapy sessions done and i can tlk to him about seei g spine specialist thank you sincerely    ----- Message -----  From: Marnee Fleischer, LPN  Sent: 51/58/89 2:49 AM  To: Gil Boyd  Subject: RE: Update Medical Information    Good morning  Patriciaaslhee Figueroa,    Dr. Richa Fabian is not in the office on Fridays.   Unfortunately, his first available is 12/29/2020.      ----- Message -----       From:Mary Jeffrey Anahi Patriciaashlee Figueroa       Sent:12/17/2020  5:31 PM EST         To:Paul Fabian MD    Subject:Update Medical Information    Good evening i would like to request an appointment for virtual visit

## 2020-12-30 ENCOUNTER — HOSPITAL ENCOUNTER (OUTPATIENT)
Dept: PHYSICAL THERAPY | Age: 40
Discharge: HOME | End: 2020-12-30
Payer: COMMERCIAL

## 2020-12-30 PROCEDURE — 97110 THERAPEUTIC EXERCISES: CPT

## 2020-12-30 PROCEDURE — 97112 NEUROMUSCULAR REEDUCATION: CPT

## 2020-12-30 NOTE — PROGRESS NOTES
PT DAILY TREATMENT NOTE     Patient Name: Shahida   Date:2020  : 1980  [x]  Patient  Verified  Payor: Amaury Alvarez / Plan: Karis Montiel / Product Type: HMO /    In time:544  Out time:637  Total Treatment Time (min): 53  Visit #: 4 of 12    Medicare/BCBS Only   Total Timed Codes (min):  43 1:1 Treatment Time:  43       Treatment Area: Low back pain [M54.5]    SUBJECTIVE  Pain Level (0-10 scale): 2  Any medication changes, allergies to medications, adverse drug reactions, diagnosis change, or new procedure performed?: [x] No    [] Yes (see summary sheet for update)  Subjective functional status/changes:   [] No changes reported  Patient reports plan to return back to work 2020. Patient reports noting an overall improvement in pain since starting. OBJECTIVE    Modality rationale: decrease pain and increase tissue extensibility to improve the patients ability to improve ease with sleep   Min Type Additional Details   10 []  Ice     [x]  heat  []  Ice massage Position: Seated  Location: Lumbar, Post-tx     25 min Therapeutic Exercise:  [x] See flow sheet : Emphasis placed on improving available spinal and LE AROM and strength   Rationale: increase ROM and increase strength to improve the patients ability to improve ease with functional ADLs    18 min Neuromuscular Re-education:  [x]  See flow sheet : Emphasis placed on improving activation and recruitment of the anterior abdominal musculature   Rationale: increase ROM, increase strength and increase proprioception  to improve the patients ability to improve ease with functional lifting.           With   [] TE   [] TA   [] neuro   [] other: Patient Education: [x] Review HEP    [] Progressed/Changed HEP based on:   [] positioning   [] body mechanics   [] transfers   [] heat/ice application    [] other:      Other Objective/Functional Measures:   Left Hip Flexion MMT 4+/5 (pain), Right Hip Flexion MMT 3+/5 (pain)     Pain Level (0-10 scale) post treatment: 4-5    ASSESSMENT/Changes in Function: With overall reduction in symptom irritability/severity secondarily with progression of functional strengthening to promote ease with functional lifting. Patient noted to demonstrate improved pelvic proprioceptive awareness in supine with overall improved activity tolerance. Patient will continue to benefit from skilled PT services to modify and progress therapeutic interventions, address functional mobility deficits, address ROM deficits, address strength deficits, analyze and address soft tissue restrictions, analyze and cue movement patterns, analyze and modify body mechanics/ergonomics and assess and modify postural abnormalities to attain remaining goals. []  See Plan of Care  []  See progress note/recertification  []  See Discharge Summary         Progress towards goals / Updated goals:    Short Term Goals: To be accomplished in 3 weeks:  1. Patient will subjectively report full compliance with prescribed HEP. Eval: HEP provided   Current: Met, Pt reports full compliance with HEP, 12/23/2020   2. Patient will demonstrate left/right hip flexion MMT 5/5 to improve ease with bed mobility. Eval: Left Hip Flexion MMT 4/5 (pain), Right Hip Flexion MMT 2+/5 (pain)   Current: Progressing, Left Hip Flexion MMT 4+/5 (pain), Right Hip Flexion MMT 3+/5 (pain), 12/30/2020  3. Patient will demonstrate lumbar extension AROM </= 25% limited with pain </= 3/10 to improve ease with overhead reach. Eval: Lumbar Extension AROM = 25% limited (pain 7/10)  Current: Progressing, Lumbar Extension AROM = 25% limited (pain 6/10), 12/28/20     Long Term Goals: To be accomplished in 6 weeks:  1. Patient will demonstrate a significant functional improvement as demonstrated by a score of >/= 61 on FOTO. Eval: FOTO = 26       2. Patient will demonstrate left/right SLS >/= 20 seconds to improve ease with stair management.   Eval: Left SLS < 2 sec / Right SLS < 2 sec  3. Patient will subjectively report pain at worst in last week 5/10 to improve overall quality of life.   Eval: Pain at Worst = 10/10  Current: Remains: pain at worst = 10/10. 12/28/20    PLAN  [x]  Upgrade activities as tolerated     [x]  Continue plan of care  []  Update interventions per flow sheet       []  Discharge due to:_  []  Other:_      Jeana Kearns, PT 12/30/2020  8:38 AM    Future Appointments   Date Time Provider Qian Garcia   12/30/2020  5:45 PM Feroz Blanchard [address] [facility]   1/4/2021  5:00 PM Dipesh Garner [place] [facility]   1/6/2021  5:00 PM Feroz Blanchard [address] [facility]   1/11/2021  5:00 PM Dipesh Garner PTA [facility]   1/13/2021  5:00 PM Feroz Blanchard [address] [facility]   1/19/2021  8:15 AM Teofilo Mustafa MD [facility]

## 2021-01-04 ENCOUNTER — HOSPITAL ENCOUNTER (OUTPATIENT)
Dept: PHYSICAL THERAPY | Age: 41
Discharge: HOME | End: 2021-01-04
Payer: COMMERCIAL

## 2021-01-04 PROCEDURE — 97110 THERAPEUTIC EXERCISES: CPT

## 2021-01-04 PROCEDURE — 97112 NEUROMUSCULAR REEDUCATION: CPT

## 2021-01-04 NOTE — PROGRESS NOTES
PT DAILY TREATMENT NOTE 11    Patient Name: Lino Redd  Date:2021  : 1980  [x]  Patient  Verified  Payor: Yohannes Mtz / Plan: Shelli Hdz / Product Type: HMO /    In time:5:03  Out time:5:54  Total Treatment Time (min): 51  Visit #: 5 of 12    Medicare/BCBS Only   Total Timed Codes (min):  41 1:1 Treatment Time:  41       Treatment Area: Low back pain [M54.5]    SUBJECTIVE  Pain Level (0-10 scale): 2  Any medication changes, allergies to medications, adverse drug reactions, diagnosis change, or new procedure performed?: [x] No    [] Yes (see summary sheet for update)  Subjective functional status/changes:   [] No changes reported  Pt reports he has most difficulty with sit to stand transitions. OBJECTIVE            Modality rationale: decrease pain to improve the patients ability to decrease difficulty while performing tasks. Min Type Additional Details    10 []? Ice     [x]? heat  []? Ice massage  []? Laser   []? Anodyne Position:supine  Location:back     []? Skin assessment post-treatment:  []?intact []? redness- no adverse reaction    []? redness  adverse reaction:         26 min Therapeutic Exercise:  [x]? See flow sheet :   Rationale: increase ROM and increase strength to improve the patients ability to increase tolerance to activities.            15 min Neuromuscular Re-education:  [x]? See flow sheet :core activation     Rationale: increase ROM, increase strength, improve coordination, improve balance and increase proprioception  to improve the patients ability to increase ease with standing tolerance.            With   [] TE   [] TA   [] neuro   [] other: Patient Education: [x] Review HEP    [] Progressed/Changed HEP based on:   [] positioning   [] body mechanics   [] transfers   [] heat/ice application    [] other:      Other Objective/Functional Measures: pain at worst = 8/10.      Pain Level (0-10 scale) post treatment: 5    ASSESSMENT/Changes in Function: Pt performs sit to stand with increased lumbar lordosis, cue required for patient to maintain PPT with core activation. Patient will continue to benefit from skilled PT services to modify and progress therapeutic interventions, address functional mobility deficits, address ROM deficits, address strength deficits and analyze and address soft tissue restrictions to attain remaining goals. []  See Plan of Care  []  See progress note/recertification  []  See Discharge Summary         Progress towards goals / Updated goals:  Short Term Goals: To be accomplished in 3 weeks:  1. Patient will subjectively report full compliance with prescribed HEP. Eval: HEP provided   Current: Met, Pt reports full compliance with HEP, 12/23/2020   2. Patient will demonstrate left/right hip flexion MMT 5/5 to improve ease with bed mobility. Eval: Left Hip Flexion MMT 4/5 (pain), Right Hip Flexion MMT 2+/5 (pain)   Current: Progressing, Left Hip Flexion MMT 4+/5 (pain), Right Hip Flexion MMT 3+/5 (pain), 12/30/2020  3. Patient will demonstrate lumbar extension AROM </= 25% limited with pain </= 3/10 to improve ease with overhead reach. Eval: Lumbar Extension AROM = 25% limited (pain 7/10)  Current: Progressing, Lumbar Extension AROM = 25% limited (pain 6/10), 12/28/20     Long Term Goals: To be accomplished in 6 weeks:  1. Patient will demonstrate a significant functional improvement as demonstrated by a score of >/= 61 on FOTO. Eval: FOTO = 26       2. Patient will demonstrate left/right SLS >/= 20 seconds to improve ease with stair management. Eval: Left SLS < 2 sec / Right SLS < 2 sec  3. Patient will subjectively report pain at worst in last week 5/10 to improve overall quality of life.   Eval: Pain at Worst = 10/10  Current: Progressing: pain at worst = 8/10. 1/4/21       PLAN  []  Upgrade activities as tolerated     [x]  Continue plan of care  []  Update interventions per flow sheet       []  Discharge due to:_  []  Other:_ Lawrence Peña, EVERT 1/4/2021  5:07 PM    Future Appointments   Date Time Provider Qian Garcia   1/6/2021  5:00 PM Ladi, [address] [facility]   1/11/2021  5:00 PM Galen Ventura [facility]   1/13/2021  5:00 PM Fozia Bateman [facility]   1/19/2021  8:15 AM Nivia Miller MD [facility]

## 2021-01-06 ENCOUNTER — HOSPITAL ENCOUNTER (OUTPATIENT)
Dept: PHYSICAL THERAPY | Age: 41
Discharge: HOME | End: 2021-01-06
Payer: COMMERCIAL

## 2021-01-06 PROCEDURE — 97112 NEUROMUSCULAR REEDUCATION: CPT

## 2021-01-06 PROCEDURE — 97110 THERAPEUTIC EXERCISES: CPT

## 2021-01-06 RX ORDER — MELOXICAM 15 MG/1
TABLET ORAL
Qty: 30 TAB | Refills: 0 | Status: SHIPPED | OUTPATIENT
Start: 2021-01-06 | End: 2021-02-08

## 2021-01-06 NOTE — PROGRESS NOTES
PT DAILY TREATMENT NOTE 11    Patient Name: Shayy Patel  Date:2021  : 1980  [x]  Patient  Verified  Payor: Gerardo Cordero / Plan: [employer] / Product Type: HMO /    In time:455  Out time:553  Total Treatment Time (min): 58  Visit #: 6 of 12    Medicare/BCBS Only   Total Timed Codes (min):  48 1:1 Treatment Time:  48       Treatment Area: Low back pain [M54.5]    SUBJECTIVE  Pain Level (0-10 scale): 9  Any medication changes, allergies to medications, adverse drug reactions, diagnosis change, or new procedure performed?: [x] No    [] Yes (see summary sheet for update)  Subjective functional status/changes:   [] No changes reported  Patient reports increase in pain today associated with work-ADLs with patient reporting performance of a combination of sitting/standing on concrete floors. OBJECTIVE    Modality rationale: decrease pain and increase tissue extensibility to improve the patients ability to improve ease with sleep   Min Type Additional Details    10 [x]? Ice     []?  heat  []? Ice massage Position: Seated  Location: Lumbar, Post-tx       28 min Therapeutic Exercise:  [x]? See flow sheet : Emphasis placed on improving available spinal and LE AROM and strength   Rationale: increase ROM and increase strength to improve the patients ability to improve ease with functional ADLs     20 min Neuromuscular Re-education:  [x]? See flow sheet : Emphasis placed on improving activation and recruitment of the anterior abdominal musculature   Rationale: increase ROM, increase strength and increase proprioception  to improve the patients ability to improve ease with functional lifting. With   [] TE   [] TA   [] neuro   [] other: Patient Education: [x] Review HEP    [] Progressed/Changed HEP based on:   [] positioning   [] body mechanics   [] transfers   [] heat/ice application    [] other:      Other Objective/Functional Measures: See goals below.      *With requirement to modify exercises performed within session secondary to patient presenting with increased symptom severity pre-treatment. Pain Level (0-10 scale) post treatment: 7    ASSESSMENT/Changes in Function: Objectively since start of care patient has demonstrated a significant improvement in available lumbar AROM and LE strength with a slight reduction in symptom irritability/severity. Patient with slight set-back upon return back to work 1/4/2021 with therapist questioning correlation to re-acclimation to workplace ADLS with patient out of work x1 month. Patient with desire to complete remaining scheduled 2 appointments at which time patient to be placed on 30 day hold prior to MD follow up at which time determination to be made regarding continuation vs. Discharge. Patient will continue to benefit from skilled PT services to modify and progress therapeutic interventions, address functional mobility deficits, address ROM deficits, address strength deficits, analyze and address soft tissue restrictions, analyze and cue movement patterns, analyze and modify body mechanics/ergonomics, assess and modify postural abnormalities, address imbalance/dizziness and instruct in home and community integration to attain remaining goals. []  See Plan of Care  [x]  See progress note/recertification  []  See Discharge Summary         Progress towards goals / Updated goals:    Short Term Goals: To be accomplished in 3 weeks:  1. Patient will subjectively report full compliance with prescribed HEP. Eval: HEP provided   Current: Met, Pt reports full compliance with HEP, 12/23/2020   2. Patient will demonstrate left/right hip flexion MMT 5/5 to improve ease with bed mobility. Eval: Left Hip Flexion MMT 4/5 (pain), Right Hip Flexion MMT 2+/5 (pain)   Current: Progressing, Left Hip Flexion MMT 4+/5 (pain), Right Hip Flexion MMT 3+/5 (pain), 12/30/2020  3.  Patient will demonstrate lumbar extension AROM </= 25% limited with pain </= 3/10 to improve ease with overhead reach. Eval: Lumbar Extension AROM = 25% limited (pain 7/10)  Current: Progressing, Lumbar Extension AROM = 25% limited (pain 6/10), 12/28/20     Long Term Goals: To be accomplished in 6 weeks:  1. Patient will demonstrate a significant functional improvement as demonstrated by a score of >/= 61 on FOTO. Eval: FOTO = 26    Current: Progressing, FOTO = 35, 1/6/2021     2. Patient will demonstrate left/right SLS >/= 20 seconds to improve ease with stair management. Eval: Left SLS < 2 sec / Right SLS < 2 sec  Current: Progressing, Left SLS 5 sec, Right SLS 6 sec, 1/6/2021  3. Patient will subjectively report pain at worst in last week 5/10 to improve overall quality of life.   Eval: Pain at Worst = 10/10  Current: Progressing, Pain at [address] = 8/10. 1/4/21    PLAN  [x]  Upgrade activities as tolerated     [x]  Continue plan of care  []  Update interventions per flow sheet       []  Discharge due to:_  []  Other:_      Meera Weller, PT 1/6/2021  8:51 AM    Future Appointments   Date Time Provider Qian Garcia   1/6/2021  5:00 PM Feroz Bledsoe [address] [facility]   1/11/2021  5:00 PM Kimberly Adair [facility]   1/13/2021  5:00 PM Fozia Walden [facility]   1/19/2021  8:15 AM Dominique Butler MD [facility]

## 2021-01-06 NOTE — PROGRESS NOTES
[facility]              [address]        [place], [address]   [phone] fax    Progress Note  Patient name: Kahlil Hobbs Start of Care: 12/10/2020   Referral source: Rob Rodriguez MD : 1980   Medical/Treatment Diagnosis: Low back pain [M54.5]  Payor: Lokesh Arndt / Plan: Stephen Marquez / Product Type: HMO /  Onset Date:2020     Prior Hospitalization: see medical history Provider#: [id number]   Medications: Verified on Patient Summary List    Comorbidities: Anxiety, Depression, BMI>30, Arthritis, B Pars Defect L5   Prior Level of Function: Work [employer], Basketball, Exercise, (I) Functional ADLs, (I) Self-Care ADLs  Visits from Start of Care: 6    Missed Visits: 0    Established Goals:       Short Term Goals: To be accomplished in 3 weeks:  1. Patient will subjectively report full compliance with prescribed HEP. Eval: HEP provided   Current: Met, Pt reports full compliance with HEP  2. Patient will demonstrate left/right hip flexion MMT 5/5 to improve ease with bed mobility. Eval: Left Hip Flexion MMT 4/5 (pain), Right Hip Flexion MMT 2+/5 (pain)   Current: Progressing, Left Hip Flexion MMT 4+/5 (pain), Right Hip Flexion MMT 3+/5 (pain)  3. Patient will demonstrate lumbar extension AROM </= 25% limited with pain </= 3/10 to improve ease with overhead reach. Eval: Lumbar Extension AROM = 25% limited (pain 7/10)  Current: Progressing, Lumbar Extension AROM = 25% limited (pain 6/10)     Long Term Goals: To be accomplished in 6 weeks:  1. Patient will demonstrate a significant functional improvement as demonstrated by a score of >/= 61 on FOTO. Eval: FOTO = 26    Current: Progressing, FOTO = 35     2. Patient will demonstrate left/right SLS >/= 20 seconds to improve ease with stair management. Eval: Left SLS < 2 sec / Right SLS < 2 sec  Current: Progressing, Left SLS 5 sec, Right SLS 6 sec  3.  Patient will subjectively report pain at worst in last week 5/10 to improve overall quality of life. Eval: Pain at Worst = 10/10  Current: Progressing, Pain at Worst = 8/10    Key Functional Changes: See goals above. Updated Goals: Continue with unmet goals above. ASSESSMENT/RECOMMENDATIONS:    Objectively since start of care patient has demonstrated a significant improvement in available lumbar AROM and LE strength with a slight reduction in symptom irritability/severity. Patient with slight set-back upon return back to work 1/4/2021 with therapist questioning correlation to re-acclimation to workplace ADLS with patient out of work x1 month. Patient with desire to complete remaining scheduled 2 appointments at which time patient to be placed on 30 day hold prior to MD follow up at which time determination to be made regarding continuation vs. Discharge.      Patient will continue to benefit from skilled PT services to modify and progress therapeutic interventions, address functional mobility deficits, address ROM deficits, address strength deficits, analyze and address soft tissue restrictions, analyze and cue movement patterns, analyze and modify body mechanics/ergonomics, assess and modify postural abnormalities, address imbalance/dizziness and instruct in home and community integration to attain remaining goals. [x]To place patient on 30 day hold following completion of remaining 2 scheduled appointments prior to MD follow up 1/19/2021.  Upon MD follow up if determination made to continue with skilled PT services, to continue therapy per initial plan/protocol at a frequency of  2 x per week for 4 weeks  []Continue therapy with the following recommended changes:_____________________      _____________________________________________________________________  []Discontinue therapy progressing towards or have reached established goals  []Discontinue therapy due to lack of appreciable progress towards goals  []Discontinue therapy due to lack of attendance or compliance  []Await Physician's recommendations/decisions regarding therapy  []Other:________________________________________________________________    Thank you for this referral.    Saniya Cordero, PT 1/6/2021 8:51 AM  NOTE TO PHYSICIAN:  PLEASE COMPLETE THE ORDERS BELOW AND   FAX TO [facility]: [phone]  If you are unable to process this request in 24 hours please contact our office: [phone]    []  I have read the above report and request that my patient continue as recommended. []  I have read the above report and request that my patient continue therapy with the following changes/special instructions:________________________________________  []I have read the above report and request that my patient be discharged from therapy.     [de-identified] Signature:____________Date:_________TIME:________    [facility] [employer], Date and Time must be completed for valid certification **

## 2021-01-11 ENCOUNTER — HOSPITAL ENCOUNTER (OUTPATIENT)
Dept: PHYSICAL THERAPY | Age: 41
Discharge: HOME | End: 2021-01-11
Payer: COMMERCIAL

## 2021-01-11 PROCEDURE — 97112 NEUROMUSCULAR REEDUCATION: CPT

## 2021-01-11 PROCEDURE — 97110 THERAPEUTIC EXERCISES: CPT

## 2021-01-11 NOTE — PROGRESS NOTES
PT DAILY TREATMENT NOTE     Patient Name: Jimenez Velazquez  Date:2021  : 1980  [x]  Patient  Verified  Payor: Nic Ghosh / Plan: Malheur Nine / Product Type: HMO /    In time:5:00  Out time:5:48  Total Treatment Time (min): 48  Visit #: 1 of 8    Medicare/BCBS Only   Total Timed Codes (min):  38 1:1 Treatment Time:  38       Treatment Area: Low back pain [M54.5]    SUBJECTIVE  Pain Level (0-10 scale): 4  Any medication changes, allergies to medications, adverse drug reactions, diagnosis change, or new procedure performed?: [x] No    [] Yes (see summary sheet for update)  Subjective functional status/changes:   [] No changes reported  Pt reports he had a pinching pain while walking at work today. OBJECTIVE              Modality rationale: decrease pain and increase tissue extensibility to improve the patients ability to improve ease with sleep   Min Type Additional Details     10 [x]? ?  Ice     []? ?  heat  []? ?  Ice massage Position: Seated  Location: Lumbar, Post-tx        28 min Therapeutic Exercise:  [x]? ? See flow sheet : Emphasis placed on improving available spinal and LE AROM and strength   Rationale: increase ROM and increase strength to improve the patients ability to improve ease with functional ADLs     10 min Neuromuscular Re-education:  [x]? ?  See flow sheet : Emphasis placed on improving activation and recruitment of the anterior abdominal musculature   Rationale: increase ROM, increase strength and increase proprioception  to improve the patients ability to improve ease with functional lifting. With   [] TE   [] TA   [] neuro   [] other: Patient Education: [x] Review HEP    [] Progressed/Changed HEP based on:   [] positioning   [] body mechanics   [] transfers   [] heat/ice application    [] other:      Other Objective/Functional Measures:  lumbar extension AROM = 25% limited (pain 2/10).      Pain Level (0-10 scale) post treatment: 3    ASSESSMENT/Changes in Function: Initiated regular exercises back into POC with no modification this session. Pt plans to go on 30 day hold after next therapy appointment on 1/13/21. Patient will continue to benefit from skilled PT services to modify and progress therapeutic interventions, address functional mobility deficits, address ROM deficits, address strength deficits and analyze and address soft tissue restrictions to attain remaining goals. []  See Plan of Care  []  See progress note/recertification  []  See Discharge Summary         Progress towards goals / Updated goals:  Short Term Goals: To be accomplished in 3 weeks:  1. Patient will subjectively report full compliance with prescribed HEP. PN: Met, Pt reports full compliance with HEP, 12/23/2020   2. Patient will demonstrate left/right hip flexion MMT 5/5 to improve ease with bed mobility. PN:  Left Hip Flexion MMT 4+/5 (pain), Right Hip Flexion MMT 3+/5 (pain), 12/30/2020  3. Patient will demonstrate lumbar extension AROM </= 25% limited with pain </= 3/10 to improve ease with overhead reach. PN: Lumbar Extension AROM = 25% limited (pain 6/10), 12/28/20  Current: Met: lumbar extension AROM = 25% limited (pain 2/10). 1/11/21     Long Term Goals: To be accomplished in 6 weeks:  1. Patient will demonstrate a significant functional improvement as demonstrated by a score of >/= 61 on FOTO. PN:  FOTO = 35, 1/6/2021     2. Patient will demonstrate left/right SLS >/= 20 seconds to improve ease with stair management. PN:  Left SLS 5 sec, Right SLS 6 sec, 1/6/2021  3. Patient will subjectively report pain at worst in last week 5/10 to improve overall quality of life.   PN:  Pain at Worst = 8/10. 1/4/21  Current: Remains: pain at worst = 8/10. 1/11/21    PLAN  []  Upgrade activities as tolerated     [x]  Continue plan of care  []  Update interventions per flow sheet       []  Discharge due to:_  []  Other:_      Kalee Valenzuela, EVERT 1/11/2021  5:05 PM    Future Appointments Date Time Provider Qian Lunai   1/13/2021  5:00 PM Fozia Bey [facility]   1/19/2021  8:15 AM Eder Dacosta MD FP BS AMB   1/28/2021 10:10 AM Carl Beltran MD [facility] BS AMB

## 2021-01-13 ENCOUNTER — HOSPITAL ENCOUNTER (OUTPATIENT)
Dept: PHYSICAL THERAPY | Age: 41
End: 2021-01-13
Payer: COMMERCIAL

## 2021-01-19 ENCOUNTER — VIRTUAL VISIT (OUTPATIENT)
Dept: FAMILY MEDICINE CLINIC | Age: 41
End: 2021-01-19
Payer: COMMERCIAL

## 2021-01-19 DIAGNOSIS — M47.816 OSTEOARTHRITIS OF LUMBAR SPINE, UNSPECIFIED SPINAL OSTEOARTHRITIS COMPLICATION STATUS: Primary | ICD-10-CM

## 2021-01-19 DIAGNOSIS — F33.0 DEPRESSION, MAJOR, RECURRENT, MILD (HCC): ICD-10-CM

## 2021-01-19 DIAGNOSIS — M43.10 ANTEROLISTHESIS: ICD-10-CM

## 2021-01-19 PROCEDURE — 99214 OFFICE O/P EST MOD 30 MIN: CPT | Performed by: FAMILY MEDICINE

## 2021-01-19 RX ORDER — ESCITALOPRAM OXALATE 20 MG/1
20 TABLET ORAL DAILY
Qty: 30 TAB | Refills: 0 | Status: SHIPPED | OUTPATIENT
Start: 2021-01-19 | End: 2021-02-16 | Stop reason: ALTCHOICE

## 2021-01-19 RX ORDER — CYCLOBENZAPRINE HCL 10 MG
TABLET ORAL
Qty: 30 TAB | Refills: 0 | Status: SHIPPED | OUTPATIENT
Start: 2021-01-19 | End: 2021-05-24 | Stop reason: ALTCHOICE

## 2021-01-19 NOTE — PROGRESS NOTES
Amber Figueredo, who was evaluated through a synchronous (real-time) audio-video encounter, and/or his healthcare decision maker, is aware that it is a billable service, with coverage as determined by his insurance carrier. He provided verbal consent to proceed: Yes, and patient identification was verified. It was conducted pursuant to the emergency declaration under the [facility], [address] [place] [address] and the [employer] and [employer] Act. A caregiver was present when appropriate. Ability to conduct physical exam was limited. I was in the office. The patient was at home. SUBJECTIVE  Chief Complaint   Patient presents with    Back Pain     in PT; taking Ibuprofen during the day      Patient presents for follow-up of low back pain. He has had chronic intermittent pain. Says his pain did improve some on meds and with PT. No radicular pains. He is soon to see the spine specialist.    Says that he has had worsening depression the past week. Says that he has initially told my nurse that he was endorsing the \"hurting self or better off dead\" question but retracts that. He says that he has had some mild headaches. He has had increased irritability. ROS:  History obtained from the patient   · Gastrointestinal: no abdominal pain on Mobic. · Neuro: The patient denies any numbness, tingling, or weakness. · :  The patient denies any bowel or bladder dysfunction. OBJECTIVE    General:  alert, cooperative, well appearing, in no apparent distress. Psych: normal affect. Mood good. Oriented x 3. Judgement and insight intact. ASSESSMENT / PLAN    ICD-10-CM ICD-9-CM    1. Osteoarthritis of lumbar spine, unspecified spinal osteoarthritis complication status  P04.667 721.3    2. Anterolisthesis  M43.10 756.12    3.  Depression, major, recurrent, mild (HCC)  F33.0 296.31 escitalopram oxalate (LEXAPRO) 20 mg tablet     DJD with anterolisthesis - cont meds. Refills sent in. Advised on continuation of PT. He has a spine clinic consultation as well. Depression - increase to Lexapro 20mg daily. Suggest counseling. All chart history elements were reviewed by me at the time of the visit even though marked at time of note closure. Patient understands our medical plan. Patient has provided input and agrees with goals. Alternatives have been explained and offered. All questions answered. The patient is to call if condition worsens or fails to improve. RTC in 4 weeks.

## 2021-01-19 NOTE — PATIENT INSTRUCTIONS
Depression Treatment: Care Instructions Your Care Instructions Depression is a condition that affects the way you feel, think, and act. It causes symptoms such as low energy, loss of interest in daily activities, and sadness or grouchiness that goes on for a long time. Depression is very common and affects men and women of all ages. Depression is a medical illness caused by changes in the natural chemicals in your brain. It is not a character flaw, and it does not mean that you are a bad or weak person. It does not mean that you are going crazy. It is important to know that depression can be treated. Medicines, counseling, and self-care can all help. Many people do not get help because they are embarrassed or think that they will get over the depression on their own. But some people do not get better without treatment. Follow-up care is a key part of your treatment and safety. Be sure to make and go to all appointments, and call your doctor if you are having problems. It's also a good idea to know your test results and keep a list of the medicines you take. How can you care for yourself at home? Learn about antidepressant medicines Antidepressant medicines can improve or end the symptoms of depression. You may need to take the medicine for at least 6 months, and often longer. Keep taking your medicine even if you feel better. If you stop taking it too soon, your symptoms may come back or get worse. You may start to feel better within 1 to 3 weeks of taking antidepressant medicine. But it can take as many as 6 to 8 weeks to see more improvement. Talk to your doctor if you have problems with your medicine or if you do not notice any improvement after 3 weeks. Antidepressants can make you feel tired, dizzy, or nervous. Some people have dry mouth, constipation, headaches, sexual problems, an upset stomach, or diarrhea. Many of these side effects are mild and go away on their own after you take the medicine for a few weeks. Some may last longer. Talk to your doctor if side effects bother you too much. You might be able to try a different medicine. If you are pregnant or breastfeeding, talk to your doctor about what medicines you can take. Learn about counseling In many cases, counseling can work as well as medicines to treat mild to moderate depression. Counseling is done by licensed mental health providers, such as psychologists, social workers, and some types of nurses. It can be done in one-on-one sessions or in a group setting. Many people find group sessions helpful. Cognitive-behavioral therapy is a type of counseling. In this treatment therapy, you learn how to see and change unhelpful thinking styles that may be adding to your depression. Counseling and medicines often work well when used together. Here are other things you could try to help with depression: · Get regular exercise. It may help you feel better. · Plan something pleasant for yourself every day. Include activities that you have enjoyed in the past. 
· Get enough sleep. Talk to your doctor if you have problems sleeping. · Eat a balanced diet. If you do not feel hungry, eat small snacks rather than large meals. · Avoid using illegal drugs or marijuana and drinking alcohol. Do not take medicines that have not been prescribed for you. They may interfere with your treatment, or they may make your depression worse. · Spend time with family and friends. It may help to speak openly about your depression with people you trust. 
· Take your medicines exactly as prescribed. Call your doctor if you think you are having a problem with your medicine. · Do not make major life decisions while you are depressed. Depression may change the way you think. You will be able to make better decisions after you feel better. · Think positively. Challenge negative thoughts with statements such as \"I am hopeful\"; \"Things will get better\"; and \"I can ask for the help I need. \" Write down these statements and read them often, even if you don't believe them yet. · Be patient with yourself. It took time for your depression to develop, and it will take time for your symptoms to improve. Do not take on too much or be too hard on yourself. · Learn all you can about depression from written and online materials. · Check out behavioral health classes to learn more about dealing with depression. · If you or someone you know talks about suicide, self-harm, or feeling hopeless, get help right away. Call the [address] [place] [address] at 9-342-572-XZCU (6-317.113.9925) or text HOME to 641509 to access the [employer] Text Line. Consider saving these numbers in your phone. When should you call for help? Call 711 anytime you think you may need emergency care. For example, call if: 
  · You feel you cannot stop from hurting yourself or someone else. Call your doctor now or seek immediate medical care if: 
  · You hear voices.  
  · You feel much more depressed. Watch closely for changes in your health, and be sure to contact your doctor if: 
  · You are having problems with your depression medicine.  
  · You are not getting better as expected. Where can you learn more? Go to http://www.gray.com/ Enter Z187 in the search box to learn more about \"Depression Treatment: Care Instructions. \" Current as of: January 31, 2020               Content Version: 12.6 © 3619-9556 [employer]. Care instructions adapted under license by [employer] (which disclaims liability or warranty for this information). If you have questions about a medical condition or this instruction, always ask your healthcare professional. [address] any warranty or liability for your use of this information.

## 2021-01-19 NOTE — PROGRESS NOTES
1. Have you been to the ER, urgent care clinic since your last visit? Hospitalized since your last visit? No    2. Have you seen or consulted any other health care providers outside of the [address] [place] [address] since your last visit? Include any pap smears or colon screening.  No

## 2021-01-20 ENCOUNTER — HOSPITAL ENCOUNTER (OUTPATIENT)
Dept: PHYSICAL THERAPY | Age: 41
Discharge: HOME | End: 2021-01-20
Payer: COMMERCIAL

## 2021-01-20 PROCEDURE — 97112 NEUROMUSCULAR REEDUCATION: CPT

## 2021-01-20 PROCEDURE — 97110 THERAPEUTIC EXERCISES: CPT

## 2021-01-20 NOTE — PROGRESS NOTES
PT DAILY TREATMENT NOTE     Patient Name: Mendel Rack  Date:2021  : 1980  [x]  Patient  Verified  Payor: Emy Helms / Plan: Pepe Cristina / Product Type: HMO /    In time:536  Out time:624  Total Treatment Time (min): 48  Visit #: 2 of 8    Medicare/BCBS Only   Total Timed Codes (min):  38 1:1 Treatment Time:  38       Treatment Area: Low back pain [M54.5]    SUBJECTIVE  Pain Level (0-10 scale): 6  Any medication changes, allergies to medications, adverse drug reactions, diagnosis change, or new procedure performed?: [x] No    [] Yes (see summary sheet for update)  Subjective functional status/changes:   [] No changes reported  Patient reports plan to follow up with MD 2021 with patient with desire to be placed on 30 day hold. OBJECTIVE    Modality rationale: decrease pain and increase tissue extensibility to improve the patients ability to improve ease with sleep   Min Type Additional Details     10 []??  Ice     [x]? ?  heat  []? ?  Ice massage Position: Seated  Location: Lumbar, Post-tx        24 min Therapeutic Exercise:  [x]? ? See flow sheet : Emphasis placed on improving available spinal and LE AROM and strength   Rationale: increase ROM and increase strength to improve the patients ability to improve ease with functional ADLs     14 min Neuromuscular Re-education:  [x]? ?  See flow sheet : Emphasis placed on improving activation and recruitment of the anterior abdominal musculature   Rationale: increase ROM, increase strength and increase proprioception  to improve the patients ability to improve ease with functional lifting. With   [] TE   [] TA   [] neuro   [] other: Patient Education: [x] Review HEP    [] Progressed/Changed HEP based on:   [] positioning   [] body mechanics   [] transfers   [] heat/ice application    [] other:      Other Objective/Functional Measures: See goals below.      Pain Level (0-10 scale) post treatment: 5    ASSESSMENT/Changes in Function: At this time patient to be placed on 30 day hold prior to evaluation by spine specialist with patient having objectively demonstrated a relative plateau, with regards to progression with receival of physical therapy services. Objectively, since start of care, patient has demonstrated a significant improvement in available lumbar AROM and LE strength but continues to subjectively report moderate-to-high symptom severity thus resulting in limitations with functional work-related and recreational ADLs. Patient to be discharged if without contact with clinic within 30 days. Patient will continue to benefit from skilled PT services to modify and progress therapeutic interventions, address functional mobility deficits, address ROM deficits, address strength deficits, analyze and address soft tissue restrictions, analyze and cue movement patterns, analyze and modify body mechanics/ergonomics, assess and modify postural abnormalities, address imbalance/dizziness and instruct in home and community integration to attain remaining goals. []  See Plan of Care  [x]  See progress note/recertification  []  See Discharge Summary         Progress towards goals / Updated goals:    Short Term Goals: To be accomplished in 3 weeks:  1. Patient will subjectively report full compliance with prescribed HEP. PN: Met, Pt reports full compliance with HEP, 12/23/2020   2. Patient will demonstrate left/right hip flexion MMT 5/5 to improve ease with bed mobility. PN:  Left Hip Flexion MMT 4+/5 (pain), Right Hip Flexion MMT 3+/5 (pain), 12/30/2020  Current: Progressing, Left Hip Flexion MMT 5/5 (pain), Right Hip Flexion MMT 5/5 (pain), 1/20/2021  3. Patient will demonstrate lumbar extension AROM </= 25% limited with pain </= 3/10 to improve ease with overhead reach.   PN: Lumbar Extension AROM = 25% limited (pain 6/10), 12/28/20  Current: Remains, Lumbar Extension AROM = 25% limited (pain 7/10). 1/20/2021     Long Term Goals: To be accomplished in 6 weeks:  1. Patient will demonstrate a significant functional improvement as demonstrated by a score of >/= 61 on FOTO. PN:  FOTO = 35, 1/6/2021   Current: Progressing, FOTO = 44, 1/20/2021    2. Patient will demonstrate left/right SLS >/= 20 seconds to improve ease with stair management. PN:  Left SLS 5 sec, Right SLS 6 sec, 1/6/2021  Current: Remains, Left SLS 5 sec / Right SLS 8 sec, 1/20/2021  3. Patient will subjectively report pain at worst in last week 5/10 to improve overall quality of life. PN:  Pain at Worst = 8/10. 1/4/21  Current: Remains, Pain at [address] = 8/10, 1/20/2021       PLAN  []  Upgrade activities as tolerated     []  Continue plan of care  []  Update interventions per flow sheet       []  Discharge due to:_  [x]  Other:_  Hold x30 days;  Evaluation with spinal specialist scheduled 1/28/2021    Ashleigh Mccrary, PT 1/20/2021  8:45 AM    Future Appointments   Date Time Provider Qian Garcia   1/20/2021  5:45 PM Kelley Plata, [address]   1/28/2021 10:10 AM Juan C Collins MD [facility] BS AMB   2/16/2021 10:30 AM Arin Gaxiola MD FP BS AMB

## 2021-01-20 NOTE — PROGRESS NOTES
[facility]              [address] [facility]        [place], [address]   [phone] fax    Progress Note  Patient name: Prabha Hobbs Start of Care: 12/10/2020   Referral source: Cam Vale MD : 1980   Medical/Treatment Diagnosis: Low back pain [M54.5]  Payor: Robel Vasques / Plan: Alyssa Cabello / Product Type: HMO /  Onset Date:2020     Prior Hospitalization: see medical history Provider#: [id number]   Medications: Verified on Patient Summary List    Comorbidities: Anxiety, Depression, BMI>30, Arthritis, B Pars Defect L5   Prior Level of Function: Work Full-Time, Basketball, Exercise, (I) Functional ADLs, (I) Self-Care ADLs  Visits from Start of Care: 8    Missed Visits: 1    Established Goals:      Short Term Goals: To be accomplished in 3 weeks:  1. Patient will subjectively report full compliance with prescribed HEP. Last PN: Met, Pt reports full compliance with HEP  2. Patient will demonstrate left/right hip flexion MMT 5/5 to improve ease with bed mobility. Last PN:  Left Hip Flexion MMT 4+/5 (pain), Right Hip Flexion MMT 3+/5 (pain), 2020  Current: Progressing, Left Hip Flexion MMT 5/5 (pain), Right Hip Flexion MMT 5/5 (pain)  3. Patient will demonstrate lumbar extension AROM </= 25% limited with pain </= 3/10 to improve ease with overhead reach. Last PN: Lumbar Extension AROM = 25% limited (pain 6/10), 20  Current: Remains, Lumbar Extension AROM = 25% limited (pain 7/10)     Long Term Goals: To be accomplished in 6 weeks:  1. Patient will demonstrate a significant functional improvement as demonstrated by a score of >/= 61 on FOTO. Last PN:  FOTO = 35, 2021   Current: Progressing, FOTO = 44  2. Patient will demonstrate left/right SLS >/= 20 seconds to improve ease with stair management. Last PN:  Left SLS 5 sec, Right SLS 6 sec, 2021  Current: Remains, Left SLS 5 sec / Right SLS 8 sec  3.  Patient will subjectively report pain at worst in last week 5/10 to improve overall quality of life. Last PN:  Pain at Worst = 8/10. 1/4/21  Current: Remains, Pain at Worst = 8/10    Key Functional Changes: See goals above. Updated Goals: Continue with unmet goals above if determination to continue with skilled PT services. ASSESSMENT/RECOMMENDATIONS:  [x]Patient to be placed on 30 day hold with determination to be made regarding appropriateness of continuation of skilled PT services. IF determination to continue with care, to continue therapy per initial plan/protocol at a frequency of  2 x per week for 4 weeks  []Continue therapy with the following recommended changes:_____________________      _____________________________________________________________________  []Discontinue therapy progressing towards or have reached established goals  []Discontinue therapy due to lack of appreciable progress towards goals  []Discontinue therapy due to lack of attendance or compliance  [x]Await Physician's recommendations/decisions regarding therapy  []Other:________________________________________________________________    Thank you for this referral.    Scotty Leo, PT 1/20/2021 5:52 PM  NOTE TO PHYSICIAN:  PLEASE COMPLETE THE ORDERS BELOW AND   FAX TO [facility]: [phone]  If you are unable to process this request in 24 hours please contact our office: [phone]    []  I have read the above report and request that my patient continue as recommended. []  I have read the above report and request that my patient continue therapy with the following changes/special instructions:________________________________________  []I have read the above report and request that my patient be discharged from therapy.     [de-identified] Signature:____________Date:_________TIME:________    [facility], Date and Time must be completed for valid certification **

## 2021-01-25 NOTE — PROGRESS NOTES
[facility]  [address], [address]  Phone: [phone]  Fax: [phone]        INITIAL CONSULTATION      HISTORY OF PRESENT ILLNESS:  Marcelo Gomez is a [id number] [facility] y.o. male whom is referred from Lucy Mendieta MD secondary to lower back pain radiating in the RLE in a L5 distribution to the foot involving the greater digit (low back pain >> RLE pain) x 12/2/2020 without trauma. He rates his pain 4-9/10. His pain is not exacerbated positionally. His pain is decreased with spinal flexion. He has treated with Ibuprofen, Mobic, MDP and Flexeril without benefit. Pt reports previously taking Neurontin in 2011. He d/c the Neurontin due to nose bleeds. Therapy notes reviewed. Pt completed PT with benefit. He is compliant with his HEP. Patient denies previous spinal surgery or injections. Pt denies change in bowel or bladder habits. Pt denies fever, weight loss, or skin changes. Patient denies history of glaucoma. Pt takes Lexapro through Lucy Mendieta MD. PmHx of sleep apnea, depression. Note from Lucy Mendieta MD dated 1/19/2021 indicating patient was seen with c/o lower back pain. Chronic and intermittent in nature. Pt has had some improvement with medications and PT. Worsening his depression. Told his nurse that he endorsed hurting himself and stated he'd be better off dead. L spine XR dated 12/3/2020 films independently reviewed. Per report, suspected bilateral L5 pars defects with L5-S1 trace anterolisthesis and degenerative disc disease. No definite acute findings. The patient is RHD.  reviewed. Body mass index is 32.19 kg/m². PCP: Lucy Mendieta MD    Past Medical History:   Diagnosis Date    Allergic rhinitis     Body mass index (BMI) of 25.0 to 29.9     DJD (degenerative joint disease), cervical 2016    MRI confirmed, foramenal stenosis     History of echocardiogram 07/22/2014    EF 60%. No RWMA. RVSP 25 mmHg.       Left lower lobe pneumonia 2013    Sleep apnea     CPAP use at times        History reviewed. No pertinent surgical history. Social History     Tobacco Use    Smoking status: Former Smoker     Packs/day: 0.10     Years: 15.00     Pack years: 1.50     Types: Cigarettes    Smokeless tobacco: Never Used    Tobacco comment: 2007   Substance Use Topics    Alcohol use: Yes     Frequency: 4 or more times a week     Drinks per session: 1 or 2       Work status: The patient is employed. Marital status: Single. Current Outpatient Medications   Medication Sig Dispense Refill    ibuprofen 200 mg cap Take  by mouth.  cyclobenzaprine (FLEXERIL) 10 mg tablet TAKE 1 TABLET BY MOUTH EVERY NIGHT AS NEEDED FOR MUSCLE SPASMS 30 Tab 0    escitalopram oxalate (LEXAPRO) 20 mg tablet Take 1 Tab by mouth daily. Indications: major depressive disorder 30 Tab 0    meloxicam (MOBIC) 15 mg tablet TAKE 1 TABLET BY MOUTH DAILY 30 Tab 0    traZODone (DESYREL) 50 mg tablet Take 1 Tab by mouth nightly. 30 Tab 5    fluticasone (FLONASE) 50 mcg/actuation nasal spray 2 Sprays by Both Nostrils route daily. 1 Bottle 11    cpap machine kit by Does Not Apply route. No Known Allergies         Family History   Problem Relation Age of Onset    Hypertension Maternal Grandmother     Diabetes Maternal Grandmother     Breast Cancer Maternal Grandmother     Other Mother         DVT         REVIEW OF SYSTEMS  Constitutional symptoms: Negative  Eyes: Negative  Ears, Nose, Throat, and Mouth: Negative  Cardiovascular: Negative  Respiratory: Negative  Genitourinary: Negative  Integumentary (Skin and/or breast): Negative  Musculoskeletal: Positive for lower back pain radiating into the RLE. Extremities: Negative for edema.   Endocrine/Rheumatologic: Negative  Hematologic/Lymphatic: Negative  Allergic/Immunologic: Negative  Psychiatric: Negative       PHYSICAL EXAMINATION  Visit Vitals  /85 (BP 1 Location: Left arm)   Pulse 81   Temp 97.9 °F (36.6 °C)   Resp 18 Ht 5' 9\" (1.753 m)   Wt 218 lb (98.9 kg)   SpO2 96%   BMI 32.19 kg/m²       CONSTITUTIONAL: NAD, A&O x 3  HEART: Regular rate and rhythm  GASTROINTESTINAL: Positive bowel sounds, soft, nontender, and nondistended  LUNGS: Clear to auscultation bilaterally. SKIN: Negative for rash. RANGE OF MOTION: The patient has full passive range of motion in all four extremities. SENSATION: Decreased sensation to light touch on the LLE in a L5 distribution. Otherwise, sensation is intact to light touch throughout. MOTOR:   Straight Leg Raise: Negative, bilateral  Garcia: Negative, bilateral  Deep tendon reflexes are 0 at the biceps, triceps, and brachioradialis bilaterally. Deep tendon reflexes are 0 at the knees and ankles bilaterally. Shoulder AB/Flex Elbow Flex Wrist Ext Elbow Ext Wrist Flex Hand Intrin Tone   Right +4/5 +4/5 +4/5 +4/5 +4/5 +4/5 +4/5   Left +4/5 +4/5 +4/5 +4/5 +4/5 +4/5 +4/5              Hip Flex Knee Ext Knee Flex Ankle DF GTE Ankle PF Tone   Right +4/5 +4/5 +4/5 +4/5 +4/5 +4/5 +4/5   Left +4/5 +4/5 +4/5 +4/5 +4/5 +4/5 +4/5       ASSESSMENT   Diagnoses and all orders for this visit:    1. Lumbosacral spondylosis without myelopathy    2. Lumbar neuritis    3. DDD (degenerative disc disease), lumbar    4. Spondylolisthesis, congenital    5. Spondylolysis, lumbosacral          IMPRESSIONS/RECOMMENDATIONS:  Patient presents today with c/o lower back pain radiating in the RLE in a L5 distribution to the foot involving the greater digit. Multiple treatment options were discussed. I will order a L spine MRI. I advised patient to bring copies of films to next visit. I will try him on Topamax 75 mg qhs. The risks, benefits, and potential side effects of this medication were discussed. Patient understands and wishes to proceed. Patient advised to call the office if intolerant to new medication. I encouraged him to continue to perform his daily HEP. Patient is neurologically intact.  I will see the patient back following the MRI or earlier if needed. Written by Mario Alberto Jay, as dictated by Sissy Lemos MD  I examined the patient, reviewed and agree with the note.

## 2021-01-28 ENCOUNTER — TELEPHONE (OUTPATIENT)
Dept: ORTHOPEDIC SURGERY | Age: 41
End: 2021-01-28

## 2021-01-28 ENCOUNTER — OFFICE VISIT (OUTPATIENT)
Dept: ORTHOPEDIC SURGERY | Age: 41
End: 2021-01-28
Payer: COMMERCIAL

## 2021-01-28 VITALS
RESPIRATION RATE: 18 BRPM | DIASTOLIC BLOOD PRESSURE: 85 MMHG | TEMPERATURE: 97.9 F | OXYGEN SATURATION: 96 % | HEIGHT: 69 IN | SYSTOLIC BLOOD PRESSURE: 132 MMHG | HEART RATE: 81 BPM | BODY MASS INDEX: 32.29 KG/M2 | WEIGHT: 218 LBS

## 2021-01-28 DIAGNOSIS — M47.817 LUMBOSACRAL SPONDYLOSIS WITHOUT MYELOPATHY: Primary | ICD-10-CM

## 2021-01-28 DIAGNOSIS — Q76.2 SPONDYLOLISTHESIS, CONGENITAL: ICD-10-CM

## 2021-01-28 DIAGNOSIS — M51.36 DDD (DEGENERATIVE DISC DISEASE), LUMBAR: ICD-10-CM

## 2021-01-28 DIAGNOSIS — M43.07 SPONDYLOLYSIS, LUMBOSACRAL: ICD-10-CM

## 2021-01-28 DIAGNOSIS — M54.16 LUMBAR NEURITIS: ICD-10-CM

## 2021-01-28 PROCEDURE — 99204 OFFICE O/P NEW MOD 45 MIN: CPT | Performed by: PHYSICAL MEDICINE & REHABILITATION

## 2021-01-28 RX ORDER — IBUPROFEN 200 MG
CAPSULE ORAL
COMMUNITY
End: 2021-02-08

## 2021-01-28 RX ORDER — TOPIRAMATE 25 MG/1
TABLET ORAL
Qty: 90 TAB | Refills: 1 | Status: SHIPPED | OUTPATIENT
Start: 2021-01-28 | End: 2021-04-01

## 2021-01-28 NOTE — LETTER
1/28/2021    Patient: Marcelo Gomez   YOB: 1980   Date of Visit: 1/28/2021     Page Rivas MD  [place] [id number] [place] [address]  [place] [employer]    Dear Page Rivas MD,      Thank you for referring Mr. Dexter Hammonds to [address] for evaluation. My notes for this consultation are attached. If you have questions, please do not hesitate to call me. I look forward to following your patient along with you.       Sincerely,    Amparo Casey MD

## 2021-01-28 NOTE — TELEPHONE ENCOUNTER
Please contact the patient's plan to initiate a prior authorization for Topiramate 25mg at [phone] or contact the pharmacy to change the medication.  Patient's ID #[id number]

## 2021-02-05 NOTE — PROGRESS NOTES
[facility]  [address], [address]  Phone: [phone]  Fax: [phone]        PROGRESS NOTE      HISTORY OF PRESENT ILLNESS:  The patient is a 45 y.o. male and was seen today for follow up of lower back pain radiating in the RLE in a L5 distribution to the foot involving the greater digit (low back pain >> RLE pain) x 12/2/2020 without trauma. His pain is not exacerbated positionally. His pain is decreased with spinal flexion. He has treated with Ibuprofen, Mobic, MDP and Flexeril without benefit. Pt reports previously taking Neurontin in 2011. He d/c the Neurontin due to nose bleeds. Therapy notes reviewed. Pt completed PT with benefit. He is compliant with his HEP. Patient denies previous spinal surgery or injections. Pt denies change in bowel or bladder habits. Pt denies fever, weight loss, or skin changes. Patient denies history of glaucoma. Pt takes Lexapro through Marjorie Marcus MD. The patient is RHD. PmHx of sleep apnea, depression. Note from Marjorie Marcus MD dated 1/19/2021 indicating patient was seen with c/o lower back pain. Chronic and intermittent in nature. Pt has had some improvement with medications and PT. Worsening his depression. Told his nurse that he endorsed hurting himself and stated he'd be better off dead. L spine XR dated 12/3/2020 films independently reviewed. Per report, suspected bilateral L5 pars defects with L5-S1 trace anterolisthesis and degenerative disc disease. No definite acute findings. At his last clinical appointment, I ordered a L spine MRI. I tried him on Topamax 75 mg qhs. I encouraged him to continue to perform his daily HEP. The patient returns today and reports pain location and distribution remains unchanged. He rates his pain 3-8/10, previously 4-9/10. Pt has only been taking Topamax 75 mg qhs for 3 days. He is tolerating the medication. He is compliant with his HEP.  Pt denies change in bowel or bladder habits. L spine MRI dated 2/5/2021 films independently reviewed. Per report, L5/S1: Bilateral L5 spondylolysis with moderate degenerative disc disease and moderate to severe facet arthropathy. Moderate left neural foramen stenosis. No significant abnormality at any other lumbar level.  reviewed. Body mass index is 32.19 kg/m². PCP: Min Goode MD      Past Medical History:   Diagnosis Date    Allergic rhinitis     Body mass index (BMI) of 25.0 to 29.9     DJD (degenerative joint disease), cervical 2016    MRI confirmed, foramenal stenosis     History of echocardiogram 07/22/2014    EF 60%. No RWMA. RVSP 25 mmHg.       Left lower lobe pneumonia 2013    Sleep apnea     CPAP use at times        Social History     Socioeconomic History    Marital status: SINGLE     Spouse name: Not on file    Number of children: Not on file    Years of education: Not on file    Highest education level: Not on file   Occupational History    Occupation:  - computer work   Social Needs    Financial resource strain: Not on file    Food insecurity     Worry: Not on file     Inability: Not on file   [employer] needs     Medical: Not on file     Non-medical: Not on file   Tobacco Use    Smoking status: Former Smoker     Packs/day: 0.10     Years: 15.00     Pack years: 1.50     Types: Cigarettes    Smokeless tobacco: Never Used    Tobacco comment: 2007   Substance and Sexual Activity    Alcohol use: Yes     Frequency: 4 or more times a week     Drinks per session: 1 or 2    Drug use: No    Sexual activity: Yes     Partners: Female   Lifestyle    Physical activity     Days per week: Not on file     Minutes per session: Not on file    Stress: Not on file   Relationships    Social connections     Talks on phone: Not on file     Gets together: Not on file     Attends Muslim service: Not on file     Active member of club or organization: Not on file     Attends meetings of clubs or organizations: Not on file     Relationship status: Not on file    Intimate partner violence     Fear of current or ex partner: Not on file     Emotionally abused: Not on file     Physically abused: Not on file     Forced sexual activity: Not on file   Other Topics Concern    Not on file   Social History Narrative    Not on file       Current Outpatient Medications   Medication Sig Dispense Refill    meloxicam (MOBIC) 15 mg tablet TAKE 1 TABLET BY MOUTH DAILY 30 Tab 0    topiramate (TOPAMAX) 25 mg tablet 3 tabs PO QHS 90 Tab 1    cyclobenzaprine (FLEXERIL) 10 mg tablet TAKE 1 TABLET BY MOUTH EVERY NIGHT AS NEEDED FOR MUSCLE SPASMS 30 Tab 0    escitalopram oxalate (LEXAPRO) 20 mg tablet Take 1 Tab by mouth daily. Indications: major depressive disorder 30 Tab 0    traZODone (DESYREL) 50 mg tablet Take 1 Tab by mouth nightly. 30 Tab 5    fluticasone (FLONASE) 50 mcg/actuation nasal spray 2 Sprays by Both Nostrils route daily. 1 Bottle 11    cpap machine kit by Does Not Apply route. No Known Allergies       PHYSICAL EXAMINATION    Visit Vitals  /77 (BP 1 Location: Left upper arm)   Pulse 86   Temp 97.1 °F (36.2 °C)   Resp 18   Ht 5' 9\" (1.753 m)   Wt 218 lb (98.9 kg)   SpO2 98%   BMI 32.19 kg/m²       CONSTITUTIONAL: NAD, A&O x 3  SENSATION: Decreased sensation to light touch on the RLE in a S1 distribution. Otherwise, intact to light touch throughout  RANGE OF MOTION: The patient has full passive range of motion in all four extremities. MOTOR:  Straight Leg Raise: Negative, bilateral                 Hip Flex Knee Ext Knee Flex Ankle DF GTE Ankle PF Tone   Right +4/5 +4/5 +4/5 +4/5 +4/5 +4/5 +4/5   Left +4/5 +4/5 +4/5 +4/5 +4/5 +4/5 +4/5       ASSESSMENT   Diagnoses and all orders for this visit:    1. Lumbosacral spondylosis without myelopathy    2. Lumbar neuritis    3. DDD (degenerative disc disease), lumbar    4. Spondylolisthesis, congenital    5.  Spondylolysis, lumbosacral      IMPRESSION AND PLAN:  Patient returns to the office today with c/o lower back pain radiating in the RLE in a L5 distribution to the foot involving the greater digit. Multiple treatment options were discussed. I offered blocks, pt deferred. He should continue on the Topamax 75 mg qhs as it was too early to assess the full benefits of this dose or increase the dose at this time. I encouraged him to continue to perform his daily HEP. Patient is neurologically intact. I will see the patient back in 1 month's time or earlier if needed. Written by García Last, as dictated by Milton Moyer MD  I examined the patient, reviewed and agree with the note.

## 2021-02-08 RX ORDER — MELOXICAM 15 MG/1
TABLET ORAL
Qty: 30 TAB | Refills: 0 | Status: SHIPPED | OUTPATIENT
Start: 2021-02-08 | End: 2021-03-15

## 2021-02-09 ENCOUNTER — OFFICE VISIT (OUTPATIENT)
Dept: ORTHOPEDIC SURGERY | Age: 41
End: 2021-02-09
Payer: COMMERCIAL

## 2021-02-09 VITALS
WEIGHT: 218 LBS | HEART RATE: 86 BPM | TEMPERATURE: 97.1 F | DIASTOLIC BLOOD PRESSURE: 77 MMHG | OXYGEN SATURATION: 98 % | RESPIRATION RATE: 18 BRPM | HEIGHT: 69 IN | BODY MASS INDEX: 32.29 KG/M2 | SYSTOLIC BLOOD PRESSURE: 129 MMHG

## 2021-02-09 DIAGNOSIS — M47.817 LUMBOSACRAL SPONDYLOSIS WITHOUT MYELOPATHY: Primary | ICD-10-CM

## 2021-02-09 DIAGNOSIS — M43.07 SPONDYLOLYSIS, LUMBOSACRAL: ICD-10-CM

## 2021-02-09 DIAGNOSIS — M51.36 DDD (DEGENERATIVE DISC DISEASE), LUMBAR: ICD-10-CM

## 2021-02-09 DIAGNOSIS — Q76.2 SPONDYLOLISTHESIS, CONGENITAL: ICD-10-CM

## 2021-02-09 DIAGNOSIS — M54.16 LUMBAR NEURITIS: ICD-10-CM

## 2021-02-09 PROCEDURE — 99214 OFFICE O/P EST MOD 30 MIN: CPT | Performed by: PHYSICAL MEDICINE & REHABILITATION

## 2021-02-09 NOTE — LETTER
2/9/2021    Patient: Ariadne Mata   YOB: 1980   Date of Visit: 2/9/2021     Valeria Jimenez MD  [address]  [place] [employer]    Dear Valeria Jimenez MD,      Thank you for referring Mr. Hugh Cho to [address] for evaluation. My notes for this consultation are attached. If you have questions, please do not hesitate to call me. I look forward to following your patient along with you.       Sincerely,    Treasure Mata MD

## 2021-02-16 ENCOUNTER — VIRTUAL VISIT (OUTPATIENT)
Dept: FAMILY MEDICINE CLINIC | Age: 41
End: 2021-02-16
Payer: COMMERCIAL

## 2021-02-16 DIAGNOSIS — F33.0 DEPRESSION, MAJOR, RECURRENT, MILD (HCC): Primary | ICD-10-CM

## 2021-02-16 PROCEDURE — 99213 OFFICE O/P EST LOW 20 MIN: CPT | Performed by: FAMILY MEDICINE

## 2021-02-16 RX ORDER — DULOXETIN HYDROCHLORIDE 30 MG/1
30 CAPSULE, DELAYED RELEASE ORAL DAILY
Qty: 7 CAP | Refills: 0 | Status: SHIPPED | OUTPATIENT
Start: 2021-02-16 | End: 2021-03-10

## 2021-02-16 RX ORDER — DULOXETIN HYDROCHLORIDE 60 MG/1
60 CAPSULE, DELAYED RELEASE ORAL DAILY
Qty: 30 CAP | Refills: 0 | Status: SHIPPED | OUTPATIENT
Start: 2021-02-16 | End: 2021-03-15

## 2021-02-16 NOTE — PROGRESS NOTES
Shayy Patel, who was evaluated through a synchronous (real-time) audio-video encounter, and/or his healthcare decision maker, is aware that it is a billable service, with coverage as determined by his insurance carrier. He provided verbal consent to proceed: Yes, and patient identification was verified. It was conducted pursuant to the emergency declaration under the [address] [facility], [address] [place] [address] and the [employer]. A caregiver was present when appropriate. Ability to conduct physical exam was limited. I was in the office. The patient was at home. SUBJECTIVE  Chief Complaint   Patient presents with    Depression     Patient presents for follow-up depression. Says that he has had improvement of depression on the lexapro 20mg daily. He says he has some times when he gets down about his back pains. He has started topamax but it is too early to see if it helps. No harmful ideations. OBJECTIVE    General:  alert, cooperative, well appearing, in no apparent distress. Psych: normal affect. Mood good. Oriented x 3. Judgement and insight intact. ASSESSMENT / PLAN    ICD-10-CM ICD-9-CM    1. Depression, major, recurrent, mild (HCC)  F33.0 296.31      Depression - transition to Cymbalta due to pain. Wean lexapro to 10mg x 1 week and start cymbalta 30mg daily. Then after 1 week start cymbalta 60mg daily. Suggest counseling once again. Back pain - seeing spine center and had recent MRI. I can refill his meds but have advised that he considers asking his spine specialist to manage all meds for back as long as he is seeing them. All chart history elements were reviewed by me at the time of the visit even though marked at time of note closure. Patient understands our medical plan. Patient has provided input and agrees with goals. Alternatives have been explained and offered. All questions answered. The patient is to call if condition worsens or fails to improve. RTC in 4 weeks for depression.

## 2021-02-16 NOTE — PROGRESS NOTES
1. Have you been to the ER, urgent care clinic since your last visit? Hospitalized since your last visit? No    2. Have you seen or consulted any other health care providers outside of the [address] [place] [address] since your last visit? Include any pap smears or colon screening.  No

## 2021-02-16 NOTE — PATIENT INSTRUCTIONS
Depression Treatment: Care Instructions Your Care Instructions Depression is a condition that affects the way you feel, think, and act. It causes symptoms such as low energy, loss of interest in daily activities, and sadness or grouchiness that goes on for a long time. Depression is very common and affects men and women of all ages. Depression is a medical illness caused by changes in the natural chemicals in your brain. It is not a character flaw, and it does not mean that you are a bad or weak person. It does not mean that you are going crazy. It is important to know that depression can be treated. Medicines, counseling, and self-care can all help. Many people do not get help because they are embarrassed or think that they will get over the depression on their own. But some people do not get better without treatment. Follow-up care is a key part of your treatment and safety. Be sure to make and go to all appointments, and call your doctor if you are having problems. It's also a good idea to know your test results and keep a list of the medicines you take. How can you care for yourself at home? Learn about antidepressant medicines Antidepressant medicines can improve or end the symptoms of depression. You may need to take the medicine for at least 6 months, and often longer. Keep taking your medicine even if you feel better. If you stop taking it too soon, your symptoms may come back or get worse. You may start to feel better within 1 to 3 weeks of taking antidepressant medicine. But it can take as many as 6 to 8 weeks to see more improvement. Talk to your doctor if you have problems with your medicine or if you do not notice any improvement after 3 weeks. Antidepressants can make you feel tired, dizzy, or nervous. Some people have dry mouth, constipation, headaches, sexual problems, an upset stomach, or diarrhea. Many of these side effects are mild and go away on their own after you take the medicine for a few weeks. Some may last longer. Talk to your doctor if side effects bother you too much. You might be able to try a different medicine. If you are pregnant or breastfeeding, talk to your doctor about what medicines you can take. Learn about counseling In many cases, counseling can work as well as medicines to treat mild to moderate depression. Counseling is done by licensed mental health providers, such as psychologists, social workers, and some types of nurses. It can be done in one-on-one sessions or in a group setting. Many people find group sessions helpful. Cognitive-behavioral therapy is a type of counseling. In this treatment therapy, you learn how to see and change unhelpful thinking styles that may be adding to your depression. Counseling and medicines often work well when used together. Here are other things you could try to help with depression: · Get regular exercise. It may help you feel better. · Plan something pleasant for yourself every day. Include activities that you have enjoyed in the past. 
· Get enough sleep. Talk to your doctor if you have problems sleeping. · Eat a balanced diet. If you do not feel hungry, eat small snacks rather than large meals. · Avoid using illegal drugs or marijuana and drinking alcohol. Do not take medicines that have not been prescribed for you. They may interfere with your treatment, or they may make your depression worse. · Spend time with family and friends. It may help to speak openly about your depression with people you trust. 
· Take your medicines exactly as prescribed. Call your doctor if you think you are having a problem with your medicine. · Do not make major life decisions while you are depressed. Depression may change the way you think. You will be able to make better decisions after you feel better. · Think positively. Challenge negative thoughts with statements such as \"I am hopeful\"; \"Things will get better\"; and \"I can ask for the help I need. \" Write down these statements and read them often, even if you don't believe them yet. · Be patient with yourself. It took time for your depression to develop, and it will take time for your symptoms to improve. Do not take on too much or be too hard on yourself. · Learn all you can about depression from written and online materials. · Check out behavioral health classes to learn more about dealing with depression. · If you or someone you know talks about suicide, self-harm, or feeling hopeless, get help right away. Call the [address] [place] [address] at 1-800-273-talk (0-401.757.2967) or text HOME to 139753 to access the Crisis Text Line. Consider saving these numbers in your phone. When should you call for help? Call 911 anytime you think you may need emergency care. For example, call if: 
  · You feel you cannot stop from hurting yourself or someone else. Call your doctor now or seek immediate medical care if: 
  · You hear voices.  
  · You feel much more depressed. Watch closely for changes in your health, and be sure to contact your doctor if: 
  · You are having problems with your depression medicine.  
  · You are not getting better as expected. Where can you learn more? Go to http://www.gray.com/ Enter A877 in the search box to learn more about \"Depression Treatment: Care Instructions. \" Current as of: January 31, 2020               Content Version: 12.6 © 9635-8335 [employer]. Care instructions adapted under license by [employer] (which disclaims liability or warranty for this information). If you have questions about a medical condition or this instruction, always ask your healthcare professional. [address] any warranty or liability for your use of this information.

## 2021-02-18 ENCOUNTER — HOSPITAL ENCOUNTER (OUTPATIENT)
Dept: PHYSICAL THERAPY | Age: 41
Discharge: HOME | End: 2021-02-18
Payer: COMMERCIAL

## 2021-02-18 PROCEDURE — 97110 THERAPEUTIC EXERCISES: CPT

## 2021-02-18 PROCEDURE — 97112 NEUROMUSCULAR REEDUCATION: CPT

## 2021-02-18 NOTE — PROGRESS NOTES
[facility]              [address]        [place], [address] [place]   [phone] fax    Progress Note  Patient name: Loly Kruse Start of Care: 12/10/2020   Referral source: Vance House MD : 1980   Medical/Treatment Diagnosis: Low back pain [M54.5]  Payor: Elliot Sauer / Plan: Ritu Krishnamurthy / Product Type: HMO /  Onset Date:2020     Prior Hospitalization: see medical history Provider#: [id number]   Medications: Verified on Patient Summary List    Comorbidities: Anxiety, Depression, BMI>30, Arthritis, B Pars Defect L5   Prior Level of Function: Work Full-Time, Basketball, Exercise, (I) Functional ADLs, (I) Self-Care ADLs  Visits from Start of Care: 9    Missed Visits: 1    Established Goals:            Short Term Goals: To be accomplished in 3 weeks:  1. Patient will subjectively report full compliance with prescribed HEP. Last PN: Met, Pt reports full compliance with HEP,   2. Patient will demonstrate left/right hip flexion MMT 5/5 to improve ease with bed mobility. Last PN:  Left Hip Flexion MMT 5/5 (pain), Right Hip Flexion MMT 5/5 (pain),   Current: Regressed: MMT left hip flexion in supine 4/5 (no pain), right hip flexor 4+/5 no pain. 3. Patient will demonstrate lumbar extension AROM </= 25% limited with pain </= 3/10 to improve ease with overhead reach. Last PN: Lumbar Extension AROM = 25% limited (pain 7/10). Current: Remains, Lumbar extension AROM = limited 40% (no pain).      Long Term Goals: To be accomplished in 6 weeks:  1. Patient will demonstrate a significant functional improvement as demonstrated by a score of >/= 61 on FOTO. Last PN:   FOTO = 44, 2021  Current: Regressed: FOTO = 42. Current: Regressed FOTO = 42. 2/   2. Patient will demonstrate left/right SLS >/= 20 seconds to improve ease with stair management. Last PN:  Left SLS 5 sec / Right SLS 8 sec,   Current: Met B SLS 30 sec.    3. Patient will subjectively report pain at worst in last week 5/10 to improve overall quality of life. Last PN:   Remains, Pain at Worst = 8/10,   Current: remains: pain at worst = 8/10/.      Key Functional Changes: See goals above. Updated Goals: Continue with unmet goals above. ASSESSMENT/RECOMMENDATIONS:    Patient has made slight regression since being placed on 30 day hold at time of last scheduled follow up 1/20/2021, in accordance with patient request prior to MD follow up, with patient non-complaint with prescribed HEP. Patient continues to have limited lumbar extension mobility and limited B hip flexor strength with associated strength. Patient will continue to benefit from skilled PT services to modify and progress therapeutic interventions, address functional mobility deficits, address ROM deficits, address strength deficits and analyze and address soft tissue restrictions to attain remaining goals. [x]Continue therapy per initial plan/protocol at a frequency of  2 x per week for 4 weeks  []Continue therapy with the following recommended changes:_____________________      _____________________________________________________________________  []Discontinue therapy progressing towards or have reached established goals  []Discontinue therapy due to lack of appreciable progress towards goals  []Discontinue therapy due to lack of attendance or compliance  []Await Physician's recommendations/decisions regarding therapy  []Other:________________________________________________________________    Thank you for this referral.    Rebecca Santoro, PTA 2/18/2021 5:24 PM  NOTE TO PHYSICIAN:  [address] AND   FAX TO [facility]: [phone]  If you are unable to process this request in 24 hours please contact our office: [phone]    []  I have read the above report and request that my patient continue as recommended.   []  I have read the above report and request that my patient continue therapy with the following changes/special instructions:________________________________________  []I have read the above report and request that my patient be discharged from therapy.     [de-identified] Signature:____________Date:_________TIME:________     Evelyn Elmore MD  ** Signature, Date and Time must be completed for valid certification **

## 2021-02-18 NOTE — PROGRESS NOTES
PT DAILY TREATMENT NOTE 11    Patient Name: Rosalina Espinal  Date:2021  : 1980  [x]  Patient  Verified  Payor: Clint Tabor / Plan: Kentrell Cooper / Product Type: HMO /    In time:5:00  Out time:5:57  Total Treatment Time (min): 57  Visit #:1 of 8    Medicare/BCBS Only   Total Timed Codes (min):  47 1:1 Treatment Time:  47       Treatment Area: Low back pain [M54.5]    SUBJECTIVE  Pain Level (0-10 scale): 4  Any medication changes, allergies to medications, adverse drug reactions, diagnosis change, or new procedure performed?: [x] No    [] Yes (see summary sheet for update)  Subjective functional status/changes:   [] No changes reported  Patient reports he continues to have constant pain in his back. OBJECTIVE            Modality rationale: decrease pain and increase tissue extensibility to improve the patients ability to improve ease with sleep   Min Type Additional Details     10 []???  Ice     [x]? ??  heat  []? ??  Ice massage Position: Seated  Location: Lumbar, Post-tx        33 min Therapeutic Exercise:  [x]? ?? See flow sheet : Emphasis placed on improving available spinal and LE AROM and strength   Rationale: increase ROM and increase strength to improve the patients ability to improve ease with functional ADLs     14 min Neuromuscular Re-education:  [x]? ??  See flow sheet : Emphasis placed on improving activation and recruitment of the anterior abdominal musculature   Rationale: increase ROM, increase strength and increase proprioception  to improve the patients ability to improve ease with functional lifting. With   [] TE   [] TA   [] neuro   [] other: Patient Education: [x] Review HEP    [] Progressed/Changed HEP based on:   [] positioning   [] body mechanics   [] transfers   [] heat/ice application    [] other:      Other Objective/Functional Measures: see goals.       Pain Level (0-10 scale) post treatment: 4    ASSESSMENT/Changes in Function: Patient has made slight regress since being on 30 day hold and not being full compliant with HEP. Pt reports that the doctor started him on new medication and suggest getting a Cortisone shot in 4 weeks. Patient continues to have limited lumbar extension mobility and limited B hip flexor strength. Patient will continue to benefit from skilled PT services to modify and progress therapeutic interventions, address functional mobility deficits, address ROM deficits, address strength deficits and analyze and address soft tissue restrictions to attain remaining goals. []  See Plan of Care  [x]  See progress note/recertification  []  See Discharge Summary         Progress towards goals / Updated goals:  Short Term Goals: To be accomplished in 3 weeks:  1. Patient will subjectively report full compliance with prescribed HEP. PN: Met, Pt reports full compliance with HEP, 12/23/2020   2. Patient will demonstrate left/right hip flexion MMT 5/5 to improve ease with bed mobility. PN:  Left Hip Flexion MMT 5/5 (pain), Right Hip Flexion MMT 5/5 (pain), 1/20/2021  Current: Regressed: MMT left hip flexion in supine 4/5 (no pain), right hip flexor 4+/5 no pain. 2/18/21  3. Patient will demonstrate lumbar extension AROM </= 25% limited with pain </= 3/10 to improve ease with overhead reach. PN: , Lumbar Extension AROM = 25% limited (pain 7/10). 1/20/2021  Current: Lumbar extension AROM = limited 40% (no pain). 2/18/21     Long Term Goals: To be accomplished in 6 weeks:  1. Patient will demonstrate a significant functional improvement as demonstrated by a score of >/= 61 on FOTO. PN:   FOTO = 44, 1/20/2021  Current: Regressed: FOTO = 42. 2/18/21   Current: Regressed FOTO = 42. 2/18/21   2. Patient will demonstrate left/right SLS >/= 20 seconds to improve ease with stair management. PN:  Left SLS 5 sec / Right SLS 8 sec, 1/20/2021  3. Patient will subjectively report pain at worst in last week 5/10 to improve overall quality of life.   PN:   Remains, Pain at Worst = 8/10, 1/20/2021  Current: remains: pain at worst = 8/10/. 2/18/21       PLAN  []  Upgrade activities as tolerated     [x]  Continue plan of care  []  Update interventions per flow sheet       []  Discharge due to:_  []  Other:_      Carrolljayde EVERT Cameron 2/18/2021  5:07 PM    Future Appointments   Date Time Provider Qian Lunai   2/22/2021  5:00 PM Nino Mercedes [place] [facility]   2/25/2021  5:00 PM Kevin Serrato, [place] [facility]   3/1/2021  [facility]   3/4/2021  5:00 [facility]   3/11/2021 11:00 AM Jhon Mathew MD [facility]

## 2021-02-21 RX ORDER — DULOXETIN HYDROCHLORIDE 30 MG/1
CAPSULE, DELAYED RELEASE ORAL
Qty: 7 CAP | Refills: 0 | OUTPATIENT
Start: 2021-02-21

## 2021-02-22 ENCOUNTER — HOSPITAL ENCOUNTER (OUTPATIENT)
Dept: PHYSICAL THERAPY | Age: 41
End: 2021-02-22
Payer: COMMERCIAL

## 2021-02-22 DIAGNOSIS — F33.0 DEPRESSION, MAJOR, RECURRENT, MILD (HCC): ICD-10-CM

## 2021-02-22 RX ORDER — ESCITALOPRAM OXALATE 20 MG/1
20 TABLET ORAL DAILY
Qty: 30 TAB | Refills: 0 | Status: CANCELLED | OUTPATIENT
Start: 2021-02-22

## 2021-02-22 NOTE — TELEPHONE ENCOUNTER
This pharmacy faxed over request for the following prescriptions to be filled:    Medication requested :   Requested Prescriptions     Pending Prescriptions Disp Refills    escitalopram oxalate (LEXAPRO) 20 mg tablet 30 Tab 0     Sig: Take 1 Tab by mouth daily.  Indications: major depressive disorder     PCP: Jimmie Viera [facility]. or Print: [facility]  Mail order or [employer] [id number] [phone]    Scheduled appointment if not seen by current providers in office: lov 2/16/2021 f/u 3/11/2021

## 2021-02-28 DIAGNOSIS — Q76.2 SPONDYLOLISTHESIS, CONGENITAL: ICD-10-CM

## 2021-02-28 DIAGNOSIS — M51.36 DDD (DEGENERATIVE DISC DISEASE), LUMBAR: ICD-10-CM

## 2021-02-28 DIAGNOSIS — M47.817 LUMBOSACRAL SPONDYLOSIS WITHOUT MYELOPATHY: ICD-10-CM

## 2021-02-28 DIAGNOSIS — M43.07 SPONDYLOLYSIS, LUMBOSACRAL: ICD-10-CM

## 2021-02-28 DIAGNOSIS — M54.16 LUMBAR NEURITIS: ICD-10-CM

## 2021-03-01 ENCOUNTER — HOSPITAL ENCOUNTER (OUTPATIENT)
Dept: PHYSICAL THERAPY | Age: 41
Discharge: HOME | End: 2021-03-01
Payer: COMMERCIAL

## 2021-03-01 PROCEDURE — 97110 THERAPEUTIC EXERCISES: CPT

## 2021-03-01 PROCEDURE — 97112 NEUROMUSCULAR REEDUCATION: CPT

## 2021-03-01 NOTE — PROGRESS NOTES
PT DAILY TREATMENT NOTE 11    Patient Name: Mayra Walls  Date:3/1/2021  : 1980  [x]  Patient  Verified  Payor: Reece Jung / Plan: Citlalli Vera / Product Type: HMO /    In time:5:06  Out time:5:59  Total Treatment Time (min): 53  Visit #: 1 of 8    Medicare/BCBS Only   Total Timed Codes (min):  43 1:1 Treatment Time:  43       Treatment Area: Low back pain [M54.5]    SUBJECTIVE  Pain Level (0-10 scale): 6  Any medication changes, allergies to medications, adverse drug reactions, diagnosis change, or new procedure performed?: [x] No    [] Yes (see summary sheet for update)  Subjective functional status/changes:   [] No changes reported  Patient states he has noticed that his new medication causes him to be drowsy. OBJECTIVE                 Modality rationale: decrease pain and increase tissue extensibility to improve the patients ability to improve ease with sleep   Min Type Additional Details     10 []????  Ice     [x]? ???  heat  []????  Ice massage Position: Seated  Location: Lumbar, Post-tx        29 min Therapeutic Exercise:  [x]? ??? See flow sheet : Emphasis placed on improving available spinal and LE AROM and strength   Rationale: increase ROM and increase strength to improve the patients ability to improve ease with functional ADLs     14 min Neuromuscular Re-education:  [x]? ???  See flow sheet : Emphasis placed on improving activation and recruitment of the anterior abdominal musculature   Rationale: increase ROM, increase strength and increase proprioception  to improve the patients ability to improve ease with functional lifting.             With   [] TE   [] TA   [] neuro   [] other: Patient Education: [x] Review HEP    [] Progressed/Changed HEP based on:   [] positioning   [] body mechanics   [] transfers   [] heat/ice application    [] other:      Other Objective/Functional Measures:  left SLS 21 sec, right SLS 30 sec     Pain Level (0-10 scale) post treatment: 4    ASSESSMENT/Changes in Function: Patient is progressing well with single leg stance balance, but continues to reports continues numbness down right LE. Continue to focus on core activation throughout session. Patient will continue to benefit from skilled PT services to modify and progress therapeutic interventions, address functional mobility deficits, address ROM deficits, address strength deficits and analyze and address soft tissue restrictions to attain remaining goals. []  See Plan of Care  []  See progress note/recertification  []  See Discharge Summary         Progress towards goals / Updated goals:  Short Term Goals: To be accomplished in 3 weeks:  1. Patient will subjectively report full compliance with prescribed HEP. PN: Met, Pt reports full compliance with HEP, 12/23/2020   2. Patient will demonstrate left/right hip flexion MMT 5/5 to improve ease with bed mobility. PN: MMT left hip flexion in supine 4/5 (no pain), right hip flexor 4+/5 no pain. 2/18/21  3. Patient will demonstrate lumbar extension AROM </= 25% limited with pain </= 3/10 to improve ease with overhead reach. PN:  Lumbar extension AROM = limited 40% (no pain). 2/18/21     Long Term Goals: To be accomplished in 6 weeks:  1. Patient will demonstrate a significant functional improvement as demonstrated by a score of >/= 61 on FOTO. PN:  FOTO = 42. 2/18/21   Current: Regressed FOTO = 42. 2/18/21   2. Patient will demonstrate left/right SLS >/= 20 seconds to improve ease with stair management. PN:  Left SLS 5 sec / Right SLS 8 sec, 1/20/2021  Current: Goal met: left SLS 21 sec, right SLS 30 sec. 3/1/21  3. Patient will subjectively report pain at worst in last week 5/10 to improve overall quality of life.   PN: : pain at worst = 8/10/. 2/18/21    PLAN  []  Upgrade activities as tolerated     [x]  Continue plan of care  []  Update interventions per flow sheet       []  Discharge due to:_  []  Other:_      Neetu Cameron PTA 3/1/2021  5:01 PM    Future Appointments   Date Time Provider Qian Garcia   3/4/2021  5:00 PM Angelina Coronado [facility]   3/11/2021 11:00 AM Britni Mcginnis MD [facility]

## 2021-03-06 ENCOUNTER — TELEPHONE (OUTPATIENT)
Dept: FAMILY MEDICINE CLINIC | Age: 41
End: 2021-03-06
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
## 2021-03-08 ENCOUNTER — OFFICE VISIT (OUTPATIENT)
Dept: ORTHOPEDIC SURGERY | Age: 41
End: 2021-03-08
Payer: COMMERCIAL

## 2021-03-08 ENCOUNTER — APPOINTMENT (OUTPATIENT)
Dept: PHYSICAL THERAPY | Age: 41
End: 2021-03-08
Payer: COMMERCIAL

## 2021-03-08 VITALS
DIASTOLIC BLOOD PRESSURE: 87 MMHG | TEMPERATURE: 98.2 F | SYSTOLIC BLOOD PRESSURE: 129 MMHG | BODY MASS INDEX: 31.28 KG/M2 | HEART RATE: 90 BPM | HEIGHT: 70 IN

## 2021-03-08 DIAGNOSIS — M51.36 DDD (DEGENERATIVE DISC DISEASE), LUMBAR: ICD-10-CM

## 2021-03-08 DIAGNOSIS — R40.20 LOC (LOSS OF CONSCIOUSNESS) (HCC): ICD-10-CM

## 2021-03-08 DIAGNOSIS — M48.061 FORAMINAL STENOSIS OF LUMBAR REGION: Primary | ICD-10-CM

## 2021-03-08 DIAGNOSIS — M47.16 OSTEOARTHRITIS OF SPINE WITH MYELOPATHY, LUMBOSACRAL REGION: ICD-10-CM
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
## 2021-03-08 PROCEDURE — 99204 OFFICE O/P NEW MOD 45 MIN: CPT | Performed by: FAMILY MEDICINE

## 2021-03-08 NOTE — PROGRESS NOTES
AVS reviewed: YES  HEP: N/A  Resources Provided: NO work note  Patient questions/concerns answered: NO pt had no question or concerns   Patient verbalized understanding of treatment plan: YES
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
## 2021-03-08 NOTE — PATIENT INSTRUCTIONS
I will be deploying to [place] with the [employer] from approximately March 27, 2021 through July 20, 2021. I will be available sporadically via My Chart or via relay of phone messages to me via the internet. Please reach out to us if there is anything we can do for you while I am away, and I look forward to seeing you again when I return in the summer. 
 
-Dr. Coley Linker

## 2021-03-08 NOTE — TELEPHONE ENCOUNTER
Actually he is under the care of a spine specialist.  I highly recommend that they call and notify them. And if he needs sooner follow-up, he should be seeing them for his back. As for the lidoderm, they can get patches over the counter or ask the spine doctor to re-prescribe them.

## 2021-03-08 NOTE — LETTER
NOTIFICATION RETURN TO WORK / SCHOOL 
 
3/8/2021 3:38 PM 
 
Mr. Yogesh Somers [address] [address] [address] [address] To Whom It May Concern: 
 
Yogesh Somers is currently under the care of [facility] He will remain off work until after evaluated Dr. Nancy Solitario for follow up. If there are questions or concerns please have the patient contact our office.  
 
 
 
Sincerely, 
 
 
Sarah Barbosa, DO

## 2021-03-08 NOTE — TELEPHONE ENCOUNTER
Patient's wife called and stated Lidocaine Patches given to patient at the ED require a prior authorization per the pharmacy. [facility] is [facility] [address], [place] (pharmacy on file). The pharmacy told them the PCP would need to do the prior auth since this was given at ED. I advised I would send the message back, but that Dr. Elo Burden may want to do a virtual visit or get more information prior to proceeding. Patient's wife stated they would be glad to do a virtual to catch Dr. Elo Burden up on what's been going on. I did not see any availability today or tomorrow so I was unable to offer him anything.
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
## 2021-03-10 ENCOUNTER — VIRTUAL VISIT (OUTPATIENT)
Dept: FAMILY MEDICINE CLINIC | Age: 41
End: 2021-03-10
Payer: COMMERCIAL

## 2021-03-10 ENCOUNTER — APPOINTMENT (OUTPATIENT)
Dept: PHYSICAL THERAPY | Age: 41
End: 2021-03-10
Payer: COMMERCIAL

## 2021-03-10 DIAGNOSIS — R82.5 POSITIVE URINE DRUG SCREEN: ICD-10-CM

## 2021-03-10 DIAGNOSIS — M47.16 OSTEOARTHRITIS OF LUMBAR SPINE WITH MYELOPATHY: ICD-10-CM
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
## 2021-03-10 DIAGNOSIS — R40.20 LOC (LOSS OF CONSCIOUSNESS) (HCC): ICD-10-CM

## 2021-03-10 DIAGNOSIS — R40.20 LOC (LOSS OF CONSCIOUSNESS) (HCC): Primary | ICD-10-CM

## 2021-03-10 DIAGNOSIS — F41.9 ANXIETY: ICD-10-CM

## 2021-03-10 PROCEDURE — 99213 OFFICE O/P EST LOW 20 MIN: CPT | Performed by: FAMILY MEDICINE

## 2021-03-10 RX ORDER — ESCITALOPRAM OXALATE 10 MG/1
10 TABLET ORAL DAILY
COMMUNITY
End: 2021-03-10

## 2021-03-10 RX ORDER — METHYLPREDNISOLONE 4 MG/1
1 TABLET ORAL
COMMUNITY
Start: 2021-03-05 | End: 2021-04-14 | Stop reason: ALTCHOICE

## 2021-03-10 RX ORDER — LIDOCAINE 50 MG/G
PATCH TOPICAL
COMMUNITY
Start: 2021-03-05 | End: 2021-05-24 | Stop reason: ALTCHOICE

## 2021-03-10 NOTE — TELEPHONE ENCOUNTER
Spoke with patient. He was advised that he is under the care of a spine specialist.  Dr. Warren Harrison highly recommends that they call and notify them. And if he needs sooner follow-up, he should be seeing them for his back.

## 2021-03-10 NOTE — PROGRESS NOTES
1. Have you been to the ER, urgent care clinic since your last visit? Hospitalized since your last visit? Yes Where: [facility] 3/5/2021    2. Have you seen or consulted any other health care providers outside of the [address] [place] [address] since your last visit? Include any pap smears or colon screening.  No

## 2021-03-10 NOTE — TELEPHONE ENCOUNTER
As an aside, patient's insurance will only cover Lidocaine patches for the diagnosis of postherpetic neuralgia.

## 2021-03-11 ENCOUNTER — HOSPITAL ENCOUNTER (OUTPATIENT)
Dept: LAB | Age: 41
Discharge: HOME | End: 2021-03-11
Payer: COMMERCIAL

## 2021-03-11 LAB
ATRIAL RATE: 84 BPM
CALCULATED P AXIS, ECG09: 74 DEGREES
CALCULATED R AXIS, ECG10: 39 DEGREES
CALCULATED T AXIS, ECG11: 34 DEGREES
DIAGNOSIS, 93000: NORMAL
P-R INTERVAL, ECG05: 132 MS
Q-T INTERVAL, ECG07: 366 MS
QRS DURATION, ECG06: 96 MS
QTC CALCULATION (BEZET), ECG08: 432 MS
VENTRICULAR RATE, ECG03: 84 BPM

## 2021-03-11 PROCEDURE — 93005 ELECTROCARDIOGRAM TRACING: CPT
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
## 2021-03-15 RX ORDER — MELOXICAM 15 MG/1
TABLET ORAL
Qty: 30 TAB | Refills: 0 | Status: SHIPPED | OUTPATIENT
Start: 2021-03-15 | End: 2021-04-14 | Stop reason: SDUPTHER

## 2021-03-15 RX ORDER — DULOXETIN HYDROCHLORIDE 60 MG/1
CAPSULE, DELAYED RELEASE ORAL
Qty: 30 CAP | Refills: 0 | Status: SHIPPED | OUTPATIENT
Start: 2021-03-15 | End: 2021-04-14

## 2021-03-15 NOTE — PROGRESS NOTES
Spoke with patient's wife, Robert Taylor (on HIPAA). She was advised of patient's normal EKG result.

## 2021-03-16 ENCOUNTER — DOCUMENTATION ONLY (OUTPATIENT)
Dept: ORTHOPEDIC SURGERY | Age: 41
End: 2021-03-16

## 2021-03-16 NOTE — PROGRESS NOTES
Called the patient to let the patient know that we have the paperwork. Informed patient via voicemail that he would have to be seen to have the paperwork filled out. Unable to reach patient. Left message with this information on voicemail.

## 2021-03-17 DIAGNOSIS — G47.00 INSOMNIA, UNSPECIFIED TYPE: ICD-10-CM

## 2021-03-17 NOTE — TELEPHONE ENCOUNTER
This pharmacy faxed over request for the following prescriptions to be filled:    Medication requested :   Requested Prescriptions     Pending Prescriptions Disp Refills    traZODone (DESYREL) 50 mg tablet 30 Tab 5     Sig: Take 1 Tab by mouth nightly.      PCP: [facility] or Print: [facility]   Mail order or Local pharmacy [id number]     Scheduled appointment if not seen by current providers in office: LOV 12/8/2020 f/i 4/14/2021

## 2021-03-18 ENCOUNTER — HOSPITAL ENCOUNTER (OUTPATIENT)
Dept: NON INVASIVE DIAGNOSTICS | Age: 41
Discharge: HOME | End: 2021-03-18
Attending: FAMILY MEDICINE
Payer: COMMERCIAL

## 2021-03-18 VITALS
WEIGHT: 218 LBS | SYSTOLIC BLOOD PRESSURE: 129 MMHG | DIASTOLIC BLOOD PRESSURE: 87 MMHG | BODY MASS INDEX: 31.21 KG/M2 | HEIGHT: 70 IN

## 2021-03-18 DIAGNOSIS — R40.20 LOC (LOSS OF CONSCIOUSNESS) (HCC): ICD-10-CM

## 2021-03-18 LAB
ECHO AO ROOT DIAM: 2.85 CM
ECHO LA AREA 4C: 13.86 CM2
ECHO LA VOL 2C: 31.04 ML (ref 18–58)
ECHO LA VOL 4C: 30.13 ML (ref 18–58)
ECHO LA VOL BP: 32.54 ML (ref 18–58)
ECHO LA VOL/BSA BIPLANE: 15.03 ML/M2 (ref 16–28)
ECHO LA VOLUME INDEX A2C: 14.34 ML/M2 (ref 16–28)
ECHO LA VOLUME INDEX A4C: 13.92 ML/M2 (ref 16–28)
ECHO LV E' LATERAL VELOCITY: 7.65 CM/S
ECHO LV E' SEPTAL VELOCITY: 7.76 CM/S
ECHO LV INTERNAL DIMENSION DIASTOLIC: 4.68 CM (ref 4.2–5.9)
ECHO LV INTERNAL DIMENSION SYSTOLIC: 3.01 CM
ECHO LV IVSD: 1.05 CM (ref 0.6–1)
ECHO LV MASS 2D: 153.4 G (ref 88–224)
ECHO LV MASS INDEX 2D: 70.9 G/M2 (ref 49–115)
ECHO LV POSTERIOR WALL DIASTOLIC: 0.86 CM (ref 0.6–1)
ECHO LVOT CARDIAC OUTPUT: 3.46 LITER/MINUTE
ECHO LVOT DIAM: 1.98 CM
ECHO LVOT PEAK GRADIENT: 2.61 MMHG
ECHO LVOT PEAK VELOCITY: 80.75 CM/S
ECHO LVOT SV: 44.3 ML
ECHO LVOT VTI: 14.4 CM
ECHO MV A VELOCITY: 54.64 CM/S
ECHO MV E DECELERATION TIME (DT): 182.4 MS
ECHO MV E VELOCITY: 55.29 CM/S
ECHO MV E/A RATIO: 1.01
ECHO MV E/E' LATERAL: 7.23
ECHO MV E/E' RATIO (AVERAGED): 7.18
ECHO MV E/E' SEPTAL: 7.13
ECHO RV TAPSE: 3.59 CM (ref 1.5–2)
LVOT MG: 1.38 MMHG

## 2021-03-18 PROCEDURE — 93306 TTE W/DOPPLER COMPLETE: CPT

## 2021-03-18 RX ORDER — TRAZODONE HYDROCHLORIDE 50 MG/1
50 TABLET ORAL
Qty: 30 TAB | Refills: 5 | Status: SHIPPED | OUTPATIENT
Start: 2021-03-18
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
## 2021-03-22 ENCOUNTER — OFFICE VISIT (OUTPATIENT)
Dept: ORTHOPEDIC SURGERY | Age: 41
End: 2021-03-22
Payer: COMMERCIAL

## 2021-03-22 VITALS
OXYGEN SATURATION: 96 % | HEART RATE: 95 BPM | RESPIRATION RATE: 18 BRPM | WEIGHT: 218 LBS | BODY MASS INDEX: 31.21 KG/M2 | HEIGHT: 70 IN | SYSTOLIC BLOOD PRESSURE: 114 MMHG | TEMPERATURE: 97.8 F | DIASTOLIC BLOOD PRESSURE: 84 MMHG

## 2021-03-22 DIAGNOSIS — M54.16 LUMBAR NEURITIS: ICD-10-CM

## 2021-03-22 DIAGNOSIS — M51.36 DDD (DEGENERATIVE DISC DISEASE), LUMBAR: ICD-10-CM

## 2021-03-22 DIAGNOSIS — M43.07 SPONDYLOLYSIS, LUMBOSACRAL: ICD-10-CM

## 2021-03-22 DIAGNOSIS — M47.817 LUMBOSACRAL SPONDYLOSIS WITHOUT MYELOPATHY: Primary | ICD-10-CM

## 2021-03-22 DIAGNOSIS — Q76.2 SPONDYLOLISTHESIS, CONGENITAL: ICD-10-CM

## 2021-03-22 PROCEDURE — 99214 OFFICE O/P EST MOD 30 MIN: CPT | Performed by: PHYSICAL MEDICINE & REHABILITATION

## 2021-03-22 RX ORDER — PREGABALIN 50 MG/1
50 CAPSULE ORAL 2 TIMES DAILY
Qty: 60 CAP | Refills: 1 | Status: SHIPPED | OUTPATIENT
Start: 2021-03-22 | End: 2021-04-14

## 2021-03-22 NOTE — LETTER
3/22/2021    Patient: Jonny Harding   YOB: 1980   Date of Visit: 3/22/2021     Damir Rush MD  [place] [id number] [place] [address]  [facility] [facility] [employer]    Dear Damir Rush MD,      Thank you for referring Mr. Haylie Adam to [address] for evaluation. My notes for this consultation are attached. If you have questions, please do not hesitate to call me. I look forward to following your patient along with you.       Sincerely,    Jasmin Carroll MD

## 2021-03-23 ENCOUNTER — DOCUMENTATION ONLY (OUTPATIENT)
Dept: ORTHOPEDIC SURGERY | Age: 41
End: 2021-03-23

## 2021-03-24 ENCOUNTER — TELEPHONE (OUTPATIENT)
Dept: ORTHOPEDIC SURGERY | Age: 41
End: 2021-03-24

## 2021-03-24 DIAGNOSIS — M47.817 LUMBOSACRAL SPONDYLOSIS WITHOUT MYELOPATHY: Primary | ICD-10-CM

## 2021-03-24 RX ORDER — DIAZEPAM 10 MG/1
TABLET ORAL
Qty: 1 TAB | Refills: 0 | Status: SHIPPED | OUTPATIENT
Start: 2021-03-24 | End: 2021-05-24 | Stop reason: ALTCHOICE

## 2021-03-30 ENCOUNTER — OFFICE VISIT (OUTPATIENT)
Dept: NEUROLOGY | Age: 41
End: 2021-03-30

## 2021-03-30 VITALS
OXYGEN SATURATION: 96 % | DIASTOLIC BLOOD PRESSURE: 78 MMHG | BODY MASS INDEX: 27.92 KG/M2 | WEIGHT: 195 LBS | SYSTOLIC BLOOD PRESSURE: 118 MMHG | HEART RATE: 101 BPM | RESPIRATION RATE: 18 BRPM | HEIGHT: 70 IN

## 2021-03-30 DIAGNOSIS — R40.20 LOC (LOSS OF CONSCIOUSNESS) (HCC): Primary | ICD-10-CM

## 2021-03-30 DIAGNOSIS — R41.3 MEMORY LOSS: ICD-10-CM

## 2021-03-30 DIAGNOSIS — R40.20 LOC (LOSS OF CONSCIOUSNESS) (HCC): ICD-10-CM

## 2021-03-30 PROCEDURE — 99204 OFFICE O/P NEW MOD 45 MIN: CPT | Performed by: NURSE PRACTITIONER
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
## 2021-04-01 DIAGNOSIS — M54.16 LUMBAR NEURITIS: ICD-10-CM

## 2021-04-01 DIAGNOSIS — M43.07 SPONDYLOLYSIS, LUMBOSACRAL: ICD-10-CM

## 2021-04-01 DIAGNOSIS — Q76.2 SPONDYLOLISTHESIS, CONGENITAL: ICD-10-CM

## 2021-04-01 DIAGNOSIS — M51.36 DDD (DEGENERATIVE DISC DISEASE), LUMBAR: ICD-10-CM

## 2021-04-01 DIAGNOSIS — M47.817 LUMBOSACRAL SPONDYLOSIS WITHOUT MYELOPATHY: ICD-10-CM

## 2021-04-01 RX ORDER — TOPIRAMATE 25 MG/1
TABLET ORAL
Qty: 90 TAB | Refills: 1 | Status: SHIPPED | OUTPATIENT
Start: 2021-04-01 | End: 2021-04-14

## 2021-04-12 ENCOUNTER — TELEPHONE (OUTPATIENT)
Dept: ORTHOPEDIC SURGERY | Age: 41
End: 2021-04-12

## 2021-04-12 DIAGNOSIS — M51.36 DDD (DEGENERATIVE DISC DISEASE), LUMBAR: ICD-10-CM

## 2021-04-12 DIAGNOSIS — M54.16 LUMBAR NEURITIS: ICD-10-CM

## 2021-04-12 DIAGNOSIS — M43.07 SPONDYLOLYSIS, LUMBOSACRAL: ICD-10-CM

## 2021-04-12 DIAGNOSIS — Q76.2 SPONDYLOLISTHESIS, CONGENITAL: ICD-10-CM

## 2021-04-12 DIAGNOSIS — M47.817 LUMBOSACRAL SPONDYLOSIS WITHOUT MYELOPATHY: Primary | ICD-10-CM

## 2021-04-12 NOTE — TELEPHONE ENCOUNTER
It is possible. If it only happened once, I would continue taking the lyrica as it helping. Dr. Genia Pleitez may want to increase the dose at the FU. If the headaches continue,  Would stop the lyrica and see if they resolve.

## 2021-04-12 NOTE — TELEPHONE ENCOUNTER
Patient contacted and given this information and I told him to call us if the headaches continue. I did offer him an earlier appt but he declined.  He stated that the medication helps at night, but during the day he is in pain

## 2021-04-12 NOTE — TELEPHONE ENCOUNTER
Patient reports the Lyrica helps at night but he still has significant pain during the day. He is walking and stretching but had to discontinue his floor exercises because they caused too much pain. He also reports a \"thunder clap headache\" 2 nights ago. He was wondering if this could be related to the medication possibly. He is requesting a return call please.   [phone]

## 2021-04-14 ENCOUNTER — VIRTUAL VISIT (OUTPATIENT)
Dept: FAMILY MEDICINE CLINIC | Age: 41
End: 2021-04-14
Payer: COMMERCIAL

## 2021-04-14 DIAGNOSIS — R40.20 LOC (LOSS OF CONSCIOUSNESS) (HCC): ICD-10-CM

## 2021-04-14 DIAGNOSIS — M47.816 OSTEOARTHRITIS OF LUMBAR SPINE, UNSPECIFIED SPINAL OSTEOARTHRITIS COMPLICATION STATUS: ICD-10-CM

## 2021-04-14 DIAGNOSIS — M54.16 LUMBAR RADICULITIS: ICD-10-CM

## 2021-04-14 DIAGNOSIS — F33.0 DEPRESSION, MAJOR, RECURRENT, MILD (HCC): Primary | ICD-10-CM

## 2021-04-14 DIAGNOSIS — F41.9 ANXIETY: ICD-10-CM

## 2021-04-14 DIAGNOSIS — G47.00 INSOMNIA, UNSPECIFIED TYPE: ICD-10-CM

## 2021-04-14 PROCEDURE — 99212 OFFICE O/P EST SF 10 MIN: CPT | Performed by: FAMILY MEDICINE

## 2021-04-14 RX ORDER — PREGABALIN 75 MG/1
75 CAPSULE ORAL 2 TIMES DAILY
Qty: 60 CAP | Refills: 1 | Status: SHIPPED | OUTPATIENT
Start: 2021-04-14 | End: 2021-06-07 | Stop reason: ALTCHOICE

## 2021-04-14 RX ORDER — MELOXICAM 15 MG/1
TABLET ORAL
Qty: 30 TAB | Refills: 0 | OUTPATIENT
Start: 2021-04-14

## 2021-04-14 RX ORDER — DULOXETIN HYDROCHLORIDE 60 MG/1
CAPSULE, DELAYED RELEASE ORAL
Qty: 30 CAP | Refills: 0 | Status: SHIPPED | OUTPATIENT
Start: 2021-04-14
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
## 2021-04-14 NOTE — TELEPHONE ENCOUNTER
This patient contacted office for the following prescriptions to be filled:    Medication requested :   Requested Prescriptions     Pending Prescriptions Disp Refills    meloxicam (MOBIC) 15 mg tablet 30 Tab 0     Mail order or Local pharmacy: [facility]  Last OV: 3/30/2021  Next OV:  4/21/2021

## 2021-04-14 NOTE — PROGRESS NOTES
Alban Cantu, who was evaluated through a synchronous (real-time) audio-video encounter, and/or his healthcare decision maker, is aware that it is a billable service, with coverage as determined by his insurance carrier. He provided verbal consent to proceed: Yes, and patient identification was verified. It was conducted pursuant to the emergency declaration under the [facility], [facility] and the [employer] [facility] General Act. A caregiver was present when appropriate. Ability to conduct physical exam was limited. I was in the office. The patient was at home. SUBJECTIVE  Chief Complaint   Patient presents with    Anxiety     Patient presents for follow-up. Seeing spine specialist for back. No new complaints. Has ongoing depression and has had intake with psych. Awaiting appt Friday. Had EKG and ECHO which were normal.   Saw neuro and having EEG and MRI soon. OBJECTIVE    General:  alert, cooperative, well appearing, in no apparent distress. ASSESSMENT / PLAN    ICD-10-CM ICD-9-CM    1. Depression, major, recurrent, mild (HCC)  F33.0 296.31    2. Anxiety  F41.9 300.00    3. Insomnia, unspecified type  G47.00 780.52    4. LOC (loss of consciousness) ([facility] [address]  R40.20 780.09    5. Osteoarthritis of lumbar spine, unspecified spinal osteoarthritis complication status  N99.189 721.3      Anxiety / depression / insomnia  - refills for now but defer to specialty after first real visit Friday. Trazodone refills okay. LOC  - EKG and ECHO reassuring. Reviewed neuro notes and awaiting work-up. Lumbar OA - cont per spine center for management. Defer med management to them for now to avoid med errors. I will see him back in 3 months to care manage. All chart history elements were reviewed by me at the time of the visit even though marked at time of note closure. Patient understands our medical plan. Patient has provided input and agrees with goals. Alternatives have been explained and offered. All questions answered. The patient is to call if condition worsens or fails to improve.

## 2021-04-16 RX ORDER — MELOXICAM 15 MG/1
15 TABLET ORAL DAILY
Qty: 30 TAB | Refills: 0 | Status: SHIPPED | OUTPATIENT
Start: 2021-04-16 | End: 2021-07-14
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
## 2021-04-21 ENCOUNTER — OFFICE VISIT (OUTPATIENT)
Dept: ORTHOPEDIC SURGERY | Age: 41
End: 2021-04-21
Payer: COMMERCIAL

## 2021-04-21 VITALS
WEIGHT: 194 LBS | SYSTOLIC BLOOD PRESSURE: 128 MMHG | TEMPERATURE: 98.6 F | HEART RATE: 98 BPM | OXYGEN SATURATION: 100 % | HEIGHT: 70 IN | DIASTOLIC BLOOD PRESSURE: 85 MMHG | BODY MASS INDEX: 27.77 KG/M2 | RESPIRATION RATE: 19 BRPM

## 2021-04-21 DIAGNOSIS — M51.36 DDD (DEGENERATIVE DISC DISEASE), LUMBAR: ICD-10-CM

## 2021-04-21 DIAGNOSIS — Q76.2 SPONDYLOLISTHESIS, CONGENITAL: ICD-10-CM

## 2021-04-21 DIAGNOSIS — M43.07 SPONDYLOLYSIS, LUMBOSACRAL: ICD-10-CM

## 2021-04-21 DIAGNOSIS — M54.16 LUMBAR NEURITIS: Primary | ICD-10-CM

## 2021-04-21 PROCEDURE — 99214 OFFICE O/P EST MOD 30 MIN: CPT | Performed by: PHYSICAL MEDICINE & REHABILITATION

## 2021-04-21 RX ORDER — TRAMADOL HYDROCHLORIDE 50 MG/1
50 TABLET ORAL 2 TIMES DAILY
Qty: 60 TAB | Refills: 0 | Status: SHIPPED | OUTPATIENT
Start: 2021-04-21 | End: 2021-05-21

## 2021-04-21 NOTE — LETTER
4/21/2021    Patient: Vimal Billing   YOB: 1980   Date of Visit: 4/21/2021     Faith Varela MD  [facility] [address] [place] [address]  [place] [place]    Dear Faith Varela MD,      Thank you for referring Mr. Peg Portillo to [facility] [address] for evaluation. My notes for this consultation are attached. If you have questions, please do not hesitate to call me. I look forward to following your patient along with you.       Sincerely,    Rolan Smith MD

## 2021-04-21 NOTE — LETTER
NOTIFICATION OF RETURN TO WORK / SCHOOL    4/21/2021 3:52 PM    MrKavon Vizcaino,[address]  . To Whom It May Concern:    Neto Perrin was under the care of [facility] [address] today [phone]. Mr. Sophie Levin is to remain out of work until his follow up on 5.21.2021. If you have any questions please call the office at [phone].       Sincerely,      Bernabe Jaffe MD

## 2021-04-24 ENCOUNTER — HOSPITAL ENCOUNTER (OUTPATIENT)
Dept: NEUROLOGY | Age: 41
Discharge: HOME | End: 2021-04-24
Attending: NURSE PRACTITIONER
Payer: COMMERCIAL

## 2021-04-24 PROCEDURE — 95816 EEG AWAKE AND DROWSY: CPT | Performed by: NURSE PRACTITIONER

## 2021-04-27 ENCOUNTER — HOSPITAL ENCOUNTER (OUTPATIENT)
Dept: MRI IMAGING | Age: 41
Discharge: HOME | End: 2021-04-27
Attending: NURSE PRACTITIONER
Payer: COMMERCIAL

## 2021-04-27 ENCOUNTER — APPOINTMENT (OUTPATIENT)
Dept: NEUROLOGY | Age: 41
End: 2021-04-27
Attending: NURSE PRACTITIONER
Payer: COMMERCIAL

## 2021-04-27 PROCEDURE — 70551 MRI BRAIN STEM W/O DYE: CPT

## 2021-04-29 ENCOUNTER — OFFICE VISIT (OUTPATIENT)
Dept: NEUROLOGY | Age: 41
End: 2021-04-29
Payer: COMMERCIAL

## 2021-04-29 DIAGNOSIS — Z91.49 HISTORY OF PSYCHOLOGICAL TRAUMA: ICD-10-CM

## 2021-04-29 DIAGNOSIS — R41.89 COGNITIVE DECLINE: Primary | ICD-10-CM

## 2021-04-29 DIAGNOSIS — R45.89 DEPRESSED MOOD: ICD-10-CM

## 2021-04-29 DIAGNOSIS — F41.8 ANXIETY ABOUT HEALTH: ICD-10-CM

## 2021-04-29 PROCEDURE — 90791 PSYCH DIAGNOSTIC EVALUATION: CPT | Performed by: PSYCHOLOGIST
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
## 2021-05-01 NOTE — PROCEDURES
[address] [place] [address] Dr LE    Name:  Segundo Stroud  MR#:   [id number]  :  1980  ACCOUNT #:  [de-identified]  DATE OF SERVICE:  2021    OUTPATIENT RECORDING    REFERRING PROVIDER:  EEG was ordered by Kacey Schaffer NP    REASON FOR EEG:  For evaluation of alteration of awareness. MEDICATIONS:  The patient's medications at the time of recording include tramadol, Cymbalta, Lyrica, diazepam, and trazodone. EEG TECHNIQUE USED:  Standard 10-20 system with 16-channel recording and an EKG. Activation procedure used was photic stimulation. Total duration of the recording was 21 minutes. This is an awake and sleep EEG recording. EEG REPORT:  The background consists of posterior dominant alpha rhythms at a frequency of 10-11 Hz, and they attenuate to eye opening. No significant asymmetry between the right and the left sides. There are no obvious spikes or sharp wave discharges. No focal slowing. There are intermittent generalized-appearing beta range fast activities. Photic stimulation did not induce any abnormal discharges. IMPRESSION:  This is basically a normal EEG recording. The fast activity indicates possible effects of medications. CLINICAL CORRELATION:  Normal EEG does not rule out the possibility of seizures or epilepsy.       MD MIRANDA [email or web address]  D:  2021 12:37  T:  2021 0:14  JOB #:  [id number]

## 2021-05-06 ENCOUNTER — HOSPITAL ENCOUNTER (OUTPATIENT)
Dept: PHYSICAL THERAPY | Age: 41
Discharge: HOME | End: 2021-05-06
Attending: PHYSICAL MEDICINE & REHABILITATION
Payer: COMMERCIAL

## 2021-05-06 PROCEDURE — 97162 PT EVAL MOD COMPLEX 30 MIN: CPT

## 2021-05-06 PROCEDURE — 97530 THERAPEUTIC ACTIVITIES: CPT

## 2021-05-06 PROCEDURE — 97110 THERAPEUTIC EXERCISES: CPT
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
## 2021-05-10 ENCOUNTER — HOSPITAL ENCOUNTER (OUTPATIENT)
Dept: PHYSICAL THERAPY | Age: 41
Discharge: HOME | End: 2021-05-10
Attending: PHYSICAL MEDICINE & REHABILITATION
Payer: COMMERCIAL

## 2021-05-10 PROCEDURE — 97112 NEUROMUSCULAR REEDUCATION: CPT

## 2021-05-10 PROCEDURE — 97110 THERAPEUTIC EXERCISES: CPT

## 2021-05-10 NOTE — PROGRESS NOTES
PT DAILY TREATMENT NOTE     Patient Name: Macho Hardwick  Date:5/10/2021  : 1980  [x]  Patient  Verified  Payor: Vandana Treadwell / Plan: [employer] / Product Type: HMO /    In time: 5:47P  Out time:6:30P  Total Treatment Time (min): 43  Visit #: 2 of 12    Medicare/BCBS Only   Total Timed Codes (min):  43 1:1 Treatment Time:  43       Treatment Area: Low back pain [M54.5]    SUBJECTIVE  Pain Level (0-10 scale): 6  Any medication changes, allergies to medications, adverse drug reactions, diagnosis change, or new procedure performed?: [x] No    [] Yes (see summary sheet for update)  Subjective functional status/changes:   [] No changes reported  Pt reports \"I feel better than the first day in here; I took some pain medication and applied icyhot before coming in through\". Reports beginning home program, however had difficulty performing secondary to difficulty to getting in/off floor. OBJECTIVE    33 min Therapeutic Exercise:  [x] See flow sheet :   Rationale: increase ROM and increase strength to improve the patients ability to perform ADLs with greater ease    10 min Neuromuscular Re-education:  [x]  See flow sheet : TA/Glut Act. Rationale: increase strength and increase proprioception  to improve the patients ability to perform transfers with greater ease         With   [x] TE   [] TA   [x] neuro   [] other: Patient Education: [x] Review HEP    [] Progressed/Changed HEP based on:   [] positioning   [] body mechanics   [] transfers   [] heat/ice application    [x] other: Ed pt to complete HEP from bed instead of transferring to/from floor - pt verbally demonstrates knowledge and understanding of this.      Pain Level (0-10 scale) post treatment: 7    ASSESSMENT/Changes in Function:   Introduced activities per POC w/ pt demonstrating overall poor tolerance requiring frequent rest breaks, verbal cuing to keep activities in tolerable/pain free ranges and verbal encouragement in order to improve performance. Patient will continue to benefit from skilled PT services to modify and progress therapeutic interventions, address functional mobility deficits, address ROM deficits, address strength deficits, analyze and address soft tissue restrictions, analyze and cue movement patterns, analyze and modify body mechanics/ergonomics, assess and modify postural abnormalities and address imbalance/dizziness to attain remaining goals. [x]  See Plan of Care  []  See progress note/recertification  []  See Discharge Summary         Progress towards goals / Updated goals:  Short Term Goals: To be accomplished in 3 weeks:  1. Patient will subjectively report full compliance with prescribed HEP. Eval: HEP provided  Current: Progressing: pt intiated program, however not fully compliant at this time, 05/10/21     2. Patient will demonstrate supine left/right hip flexion MMT >/= 4+/5 to improve ease with bed mobility and dressing. Eval: Supine Left Hip Flexion MMT = 2+/5, Supine Right Hip Flexion MMT = 2+/5  3. Patient will demonstrate ability to perform sit-to-stand from chair without UE assistance to improve ease with functional transfers. Eval: Sit-to-Stand (chair) = Inability to perform     Long Term Goals: To be accomplished in 6 weeks:  1. Patient will demonstrate a significant functional improvement as demonstrated by a score of >/= 42 on FOTO. Eval: FOTO = 16       2. Patient will demonstrate Rhomberg (EC) >/= 30 seconds to demonstrate a reduced falls risk. Eval: Rhomberg (EC) = 8 sec  3. Patient will demonstrate TUG (10 feet, no AD) </= 15 seconds to demonstrate a reduced falls risk.   Eval: Inability to assess    PLAN  [x]  Upgrade activities as tolerated     [x]  Continue plan of care  []  Update interventions per flow sheet       []  Discharge due to:_  []  Other:_      Clint Quintana DPT 5/10/2021  5:58 PM    Future Appointments   Date Time Provider Qian Garcia   5/12/2021  8:30 AM Julio Cesar [place], PHD [facility] BS AMB   5/18/2021  4:15 PM Josiah Blake, PT [facility]   5/20/2021  5:45 PM Son Baker [place] [facility]   5/24/2021  2:30 PM Jen Smith MD [facility] BS AMB   5/24/2021  5[facility]   5/26/2021  5:45 PM Fozia Biswas [facility]   6/2/2021  5:45 PM Josiah Blake, PT [facility]   6/3/2021  5:00 PM Fozia Biswas [facility]   6/8/2021  5:45 PM Minor Zapata PT [facility]   6/10/2021  5:45 PM Fozia Biswas [facility]   7/14/2021  4:45 PM Jodie Blackman MD [facility] BS AMB

## 2021-05-12 ENCOUNTER — OFFICE VISIT (OUTPATIENT)
Dept: NEUROLOGY | Age: 41
End: 2021-05-12
Payer: COMMERCIAL

## 2021-05-12 DIAGNOSIS — R41.81 AGE-RELATED COGNITIVE DECLINE: Primary | ICD-10-CM

## 2021-05-12 DIAGNOSIS — F41.8 ANXIETY WITH SOMATIC FEATURES: ICD-10-CM

## 2021-05-12 DIAGNOSIS — F90.2 ATTENTION DEFICIT HYPERACTIVITY DISORDER (ADHD), COMBINED TYPE: Chronic | ICD-10-CM

## 2021-05-12 DIAGNOSIS — F33.3 SEVERE EPISODE OF RECURRENT MAJOR DEPRESSIVE DISORDER, WITH PSYCHOTIC FEATURES (HCC): ICD-10-CM

## 2021-05-12 DIAGNOSIS — F43.10 PTSD (POST-TRAUMATIC STRESS DISORDER): ICD-10-CM

## 2021-05-12 PROCEDURE — 96133 NRPSYC TST EVAL PHYS/QHP EA: CPT | Performed by: PSYCHOLOGIST

## 2021-05-12 PROCEDURE — 96138 PSYCL/NRPSYC TECH 1ST: CPT | Performed by: PSYCHOLOGIST

## 2021-05-12 PROCEDURE — 96136 PSYCL/NRPSYC TST PHY/QHP 1ST: CPT | Performed by: PSYCHOLOGIST

## 2021-05-12 PROCEDURE — 96132 NRPSYC TST EVAL PHYS/QHP 1ST: CPT | Performed by: PSYCHOLOGIST

## 2021-05-12 PROCEDURE — 96137 PSYCL/NRPSYC TST PHY/QHP EA: CPT | Performed by: PSYCHOLOGIST

## 2021-05-12 PROCEDURE — 96139 PSYCL/NRPSYC TST TECH EA: CPT | Performed by: PSYCHOLOGIST

## 2021-05-13 ENCOUNTER — PATIENT MESSAGE (OUTPATIENT)
Dept: FAMILY MEDICINE CLINIC | Age: 41
End: 2021-05-13

## 2021-05-13 ENCOUNTER — HOSPITAL ENCOUNTER (OUTPATIENT)
Dept: PHYSICAL THERAPY | Age: 41
Discharge: HOME | End: 2021-05-13
Attending: PHYSICAL MEDICINE & REHABILITATION
Payer: COMMERCIAL

## 2021-05-13 PROCEDURE — 97112 NEUROMUSCULAR REEDUCATION: CPT

## 2021-05-13 PROCEDURE — 97110 THERAPEUTIC EXERCISES: CPT
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
## 2021-05-13 NOTE — PROGRESS NOTES
PT DAILY TREATMENT NOTE 11    Patient Name: Rojas [place]  Date:2021  : 1980  [x]  Patient  Verified  Payor: Kirsten Stringer / Plan: Becky Ayon / Product Type: HMO /    In time:411  Out time:504  Total Treatment Time (min): 53  Visit #: 3 of 12    Medicare/BCBS Only   Total Timed Codes (min):  43 1:1 Treatment Time:  40       Treatment Area: Low back pain [M54.5]    SUBJECTIVE  Pain Level (0-10 scale): 6  Any medication changes, allergies to medications, adverse drug reactions, diagnosis change, or new procedure performed?: [x] No    [] Yes (see summary sheet for update)  Subjective functional status/changes:   [] No changes reported  Patient reports feeling depressed after last treatment session secondary to limitations with activity tolerance. Patient reports improved tolerance to HEP performance secondary to performance on the bed instead of on the floor. OBJECTIVE    Modality rationale: decrease pain and increase tissue extensibility to improve the patients ability to improve ease with sleep   Min Type Additional Details   10 []  Ice     [x]  heat  []  Ice massage Position: Supine  Location: Lumbar, Post-tx     23 min Therapeutic Exercise:  [x] See flow sheet: Emphasis placed on improving available lumbar and LE AROM and strength   Rationale: increase ROM and increase strength to improve the patients ability to improve ease with ADL performance    20 min Neuromuscular Re-education:  [x]  See flow sheet : Emphasis placed on improving activation and recruitment of the anterior abdominal and gluteal musculature and improving spinal proprioceptive and kinesthetic awareness   Rationale: increase ROM, increase strength, improve balance and increase proprioception  to improve the patients ability to improve ease with functional transfers.            With   [] TE   [] TA   [] neuro   [] other: Patient Education: [x] Review HEP    [] Progressed/Changed HEP based on:   [] positioning   [] body mechanics   [] transfers   [] heat/ice application    [] other:      Other Objective/Functional Measures:   Rhomberg (EC) = 26 sec (moderate sway)     Pain Level (0-10 scale) post treatment: 7    ASSESSMENT/Changes in Function: Max verbal encouragement required with patient subjectively reporting frustration with currently diminished functional activity tolerance with patient encouraged to focus on progression towards short-term functional goals which are attainable and realistic to improve self-perspective. With increased time required with exercise performance with poor non-specific motor control. Patient will continue to benefit from skilled PT services to modify and progress therapeutic interventions, address functional mobility deficits, address ROM deficits, address strength deficits, analyze and address soft tissue restrictions, analyze and cue movement patterns, analyze and modify body mechanics/ergonomics, assess and modify postural abnormalities, address imbalance/dizziness and instruct in home and community integration to attain remaining goals. []  See Plan of Care  []  See progress note/recertification  []  See Discharge Summary         Progress towards goals / Updated goals:    Short Term Goals: To be accomplished in 3 weeks:  1. Patient will subjectively report full compliance with prescribed HEP. Eval: HEP provided  Current: Progressing, Patient intiated program, however not fully compliant at this time, 05/10/21   2. Patient will demonstrate supine left/right hip flexion MMT >/= 4+/5 to improve ease with bed mobility and dressing. Eval: Supine Left Hip Flexion MMT = 2+/5, Supine Right Hip Flexion MMT = 2+/5  3. Patient will demonstrate ability to perform sit-to-stand from chair without UE assistance to improve ease with functional transfers. Eval: Sit-to-Stand (chair) = Inability to perform     Long Term Goals: To be accomplished in 6 weeks:  1.  Patient will demonstrate a significant functional improvement as demonstrated by a score of >/= 42 on FOTO. Eval: FOTO = 16       2. Patient will demonstrate Rhomberg (EC) >/= 30 seconds to demonstrate a reduced falls risk. Eval: Rhomberg (EC) = 8 sec  Current: Progressing, Rhomberg (EC) = 26 sec (moderate sway), 5/13/2021  3. Patient will demonstrate TUG (10 feet, no AD) </= 15 seconds to demonstrate a reduced falls risk.   Eval: Inability to assess    PLAN  [x]  Upgrade activities as tolerated     [x]  Continue plan of care  []  Update interventions per flow sheet       []  Discharge due to:_  []  Other:_      King Dickson, PT 5/13/2021  3:22 PM    Future Appointments   Date Time Provider Qian Garcia   5/13/2021  4:15 PM Olive Sparks PT [facility]   5/18/2021  4:15 PM Ladi, [address] [facility]   5/20/2021  5:45 PM Devante Holden PTA [facility]   5/24/2021  2:30 PM Tiana Richardson MD [place] [facility]   5/24/2021  5:45 PM Devante Holden PTA [facility]   5/25/2021  1:00 PM Jose Luis Morales, PHD [facility] BS AMB   5/26/2021  5:45 PM Olive Sparks PT [facility]   6/1/2021  2:20 PM Bebo Vera, NP [facility] BS AMB   6/2/2021  5:45 PM Olive Sparks PT [facility]   6/3/2021  5:00 PM Fozia Villeda [facility]   6/8/2021  5:45 PM Farrukh Edge PT [facility]   6/10/2021  5:45 PM Ladi, [address] [facility]   7/14/2021  4:45 PM Sukh Zambrano MD [facility]

## 2021-05-14 ENCOUNTER — DOCUMENTATION ONLY (OUTPATIENT)
Dept: ORTHOPEDIC SURGERY | Age: 41
End: 2021-05-14

## 2021-05-18 ENCOUNTER — HOSPITAL ENCOUNTER (OUTPATIENT)
Dept: PHYSICAL THERAPY | Age: 41
Discharge: HOME | End: 2021-05-18
Attending: PHYSICAL MEDICINE & REHABILITATION
Payer: COMMERCIAL

## 2021-05-18 PROCEDURE — 97112 NEUROMUSCULAR REEDUCATION: CPT

## 2021-05-18 PROCEDURE — 97110 THERAPEUTIC EXERCISES: CPT

## 2021-05-18 NOTE — PROGRESS NOTES
PT DAILY TREATMENT NOTE     Patient Name: Annmarie Song  Date:2021  : 1980  [x]  Patient  Verified  Payor: Trip Persaud / Plan: Austin Lopez / Product Type: HMO /    In time:419  Out time:510  Total Treatment Time (min): 46  Visit #: 4 of 12    Medicare/BCBS Only   Total Timed Codes (min):  41 1:1 Treatment Time:  41       Treatment Area: Low back pain [M54.5]    SUBJECTIVE  Pain Level (0-10 scale): 0  Any medication changes, allergies to medications, adverse drug reactions, diagnosis change, or new procedure performed?: [x] No    [] Yes (see summary sheet for update)  Subjective functional status/changes:   [] No changes reported  Patient reports performing his prescribed HEP this AM. Patient reports no adverse response to last treatment session. OBJECTIVE    Modality rationale: decrease pain and increase tissue extensibility to improve the patients ability to improve ease with sleep   Min Type Additional Details    10 []? Ice     [x]? heat  []? Ice massage Position: Supine  Location: Lumbar, Post-tx       25 min Therapeutic Exercise:  [x]? See flow sheet: Emphasis placed on improving available lumbar and LE AROM and strength   Rationale: increase ROM and increase strength to improve the patients ability to improve ease with ADL performance     16 min Neuromuscular Re-education:  [x]? See flow sheet : Emphasis placed on improving activation and recruitment of the anterior abdominal and gluteal musculature and improving spinal proprioceptive and kinesthetic awareness   Rationale: increase ROM, increase strength, improve balance and increase proprioception  to improve the patients ability to improve ease with functional transfers.         With   [] TE   [] TA   [] neuro   [] other: Patient Education: [x] Review HEP    [] Progressed/Changed HEP based on:   [] positioning   [] body mechanics   [] transfers   [] heat/ice application    [] other:      Other Objective/Functional Measures:   Supine Left Hip Flexion MMT = 2+/5, Supine Right Hip Flexion MMT = 2+/5     Pain Level (0-10 scale) post treatment: 5    ASSESSMENT/Changes in Function: With diminished right gluteal activation with patient educated in use of self tactile cuing through palpation and tactile tapping to improve muscular recruitment. With improved speed demonstrated with exercise performance with ability to successfully progress through increase in exercises performed within session with continued emphasis placed on graded exercise. Patient will continue to benefit from skilled PT services to modify and progress therapeutic interventions, address functional mobility deficits, address ROM deficits, address strength deficits, analyze and address soft tissue restrictions, analyze and cue movement patterns, analyze and modify body mechanics/ergonomics, assess and modify postural abnormalities, address imbalance/dizziness and instruct in home and community integration to attain remaining goals. []  See Plan of Care  []  See progress note/recertification  []  See Discharge Summary         Progress towards goals / Updated goals:    Short Term Goals: To be accomplished in 3 weeks:  1. Patient will subjectively report full compliance with prescribed HEP. Eval: HEP provided  Current: Progressing, Patient intiated program, however not fully compliant at this time, 05/10/21   2. Patient will demonstrate supine left/right hip flexion MMT >/= 4+/5 to improve ease with bed mobility and dressing. Eval: Supine Left Hip Flexion MMT = 2+/5, Supine Right Hip Flexion MMT = 2+/5  Current: Remains, Supine Left Hip Flexion MMT = 2+/5, Supine Right Hip Flexion MMT = 2+/5, 5/18/2021   3. Patient will demonstrate ability to perform sit-to-stand from chair without UE assistance to improve ease with functional transfers. Eval: Sit-to-Stand (chair) = Inability to perform     Long Term Goals: To be accomplished in 6 weeks:  1.  Patient will demonstrate a significant functional improvement as demonstrated by a score of >/= 42 on FOTO. Eval: FOTO = 16       2. Patient will demonstrate Rhomberg (EC) >/= 30 seconds to demonstrate a reduced falls risk. Eval: Rhomberg (EC) = 8 sec  Current: Progressing, Rhomberg (EC) = 26 sec (moderate sway), 5/13/2021  3. Patient will demonstrate TUG (10 feet, no AD) </= 15 seconds to demonstrate a reduced falls risk.   Eval: Inability to assess       PLAN  [x]  Upgrade activities as tolerated     [x]  Continue plan of care  []  Update interventions per flow sheet       []  Discharge due to:_  []  Other:_      Noelle Benitez, PT 5/18/2021  7:05 AM    Future Appointments   Date Time Provider Qian Radha   5/18/2021  4:15 PM Ladi, [address] [facility]   5/20/2021  5:45 PM Annalisa Crews, [place] [facility]   5/24/2021  2:30 PM Fay Azul MD [place] [facility] AM[facility]   5/24/2021  5:45 PM Annalisa Crews, [place] [facility]   5/25/2021  1:00 PM Jose Angel Castro, PHD [facility] BS AMB   5/26/2021  5:45 PM Sandra Host, PT [facility]   6/1/2021  2:20 PM Brook Vera, NP [facility] BS AMB   6/2/2021  5:45 PM Sandra Host, PT [facility]   6/3/2021  5:00 PM Sandra Rosen, [place] [facility]   6/8/2021  5:45 PM Wally Allen, PT [facility]   6/10/2021  5:45 PM Ladi, [address] [facility]   7/14/2021  4:45 PM Love Britt MD [facility] AMB

## 2021-05-20 ENCOUNTER — HOSPITAL ENCOUNTER (OUTPATIENT)
Dept: PHYSICAL THERAPY | Age: 41
Discharge: HOME | End: 2021-05-20
Attending: PHYSICAL MEDICINE & REHABILITATION
Payer: COMMERCIAL

## 2021-05-20 ENCOUNTER — DOCUMENTATION ONLY (OUTPATIENT)
Dept: ORTHOPEDIC SURGERY | Age: 41
End: 2021-05-20

## 2021-05-20 PROCEDURE — 97112 NEUROMUSCULAR REEDUCATION: CPT

## 2021-05-20 PROCEDURE — 97110 THERAPEUTIC EXERCISES: CPT

## 2021-05-20 NOTE — PROGRESS NOTES
PT DAILY TREATMENT NOTE     Patient Name: Adebayo Begum  Date:2021  : 1980  [x]  Patient  Verified  Payor: Delphine Schafer / Plan: Tesha Christina / Product Type: HMO /    In time:5:45  Out time:6:34  Total Treatment Time (min): 49  Visit #: 5 of 12    Medicare/BCBS Only   Total Timed Codes (min):  39 1:1 Treatment Time:  39       Treatment Area: Low back pain [M54.5]    SUBJECTIVE  Pain Level (0-10 scale): 4  Any medication changes, allergies to medications, adverse drug reactions, diagnosis change, or new procedure performed?: [x] No    [] Yes (see summary sheet for update)  Subjective functional status/changes:   [] No changes reported  Patient reports that he has been feeling better today and took a Tramadol before coming to therapy. OBJECTIVE            Modality rationale: decrease pain and increase tissue extensibility to improve the patients ability to improve ease with sleep   Min Type Additional Details     10 []??  Ice     [x]? ?  heat  []? ?  Ice massage Position: Supine  Location: Lumbar, Post-tx        23 min Therapeutic Exercise:  [x]? ? See flow sheet: Emphasis placed on improving available lumbar and LE AROM and strength   Rationale: increase ROM and increase strength to improve the patients ability to improve ease with ADL performance     16 min Neuromuscular Re-education:  [x]? ?  See flow sheet : Emphasis placed on improving activation and recruitment of the anterior abdominal and gluteal musculature and improving spinal proprioceptive and kinesthetic awareness   Rationale: increase ROM, increase strength, improve balance and increase proprioception  to improve the patients ability to improve ease with functional transfers.         With   [] TE   [] TA   [] neuro   [] other: Patient Education: [x] Review HEP    [] Progressed/Changed HEP based on:   [] positioning   [] body mechanics   [] transfers   [] heat/ice application    [] other:      Other Objective/Functional Measures: Patient requires B UE to perform sit to stand. Pain Level (0-10 scale) post treatment: 7    ASSESSMENT/Changes in Function: Patient has poor bed mobility and transitional movements and requires assist to manage LE. Patient will continue to benefit from skilled PT services to modify and progress therapeutic interventions, address functional mobility deficits, address ROM deficits, address strength deficits and analyze and address soft tissue restrictions to attain remaining goals. []  See Plan of Care  []  See progress note/recertification  []  See Discharge Summary         Progress towards goals / Updated goals:  Short Term Goals: To be accomplished in 3 weeks:  1. Patient will subjectively report full compliance with prescribed HEP. Eval: HEP provided  Current: Progressing, Patient intiated program, however not fully compliant at this time, 05/10/21   2. Patient will demonstrate supine left/right hip flexion MMT >/= 4+/5 to improve ease with bed mobility and dressing. Eval: Supine Left Hip Flexion MMT = 2+/5, Supine Right Hip Flexion MMT = 2+/5  Current: Remains, Supine Left Hip Flexion MMT = 2+/5, Supine Right Hip Flexion MMT = 2+/5, 5/18/2021   3. Patient will demonstrate ability to perform sit-to-stand from chair without UE assistance to improve ease with functional transfers. Eval: Sit-to-Stand (chair) = Inability to perform     Long Term Goals: To be accomplished in 6 weeks:  1. Patient will demonstrate a significant functional improvement as demonstrated by a score of >/= 42 on FOTO. Eval: FOTO = 16       2. Patient will demonstrate Rhomberg (EC) >/= 30 seconds to demonstrate a reduced falls risk. Eval: Rhomberg (EC) = 8 sec  Current: Progressing, Rhomberg (EC) = 26 sec (moderate sway), 5/13/2021  3. Patient will demonstrate TUG (10 feet, no AD) </= 15 seconds to demonstrate a reduced falls risk.   Eval: Inability to assess       PLAN  []  Upgrade activities as tolerated     [x] Continue plan of care  []  Update interventions per flow sheet       []  Discharge due to:_  []  Other:_      Aminah Barragan, PTA 5/20/2021  5:47 PM    Future Appointments   Date Time Provider Qian Garcia   5/24/2021  2:30 PM German Lobato MD [place] [facility] AM[facility]   5/24/2021  5:45 PM [facility]   5/25/2021  1:00 PM Casimiro Snaz, PHD [facility] BS AMB   5/26/2021  5:45 PM Georgette Mae, PT [facility]   6/1/2021  2:20 PM Amy Vera, NP [facility] BS AMB   6/2/2021  5:45 PM Georgette Mae, PT [facility]   6/3/2021  5:00 PM Georgette Mae, [address] [facility]   6/8/2021  5:45 PM Hayley Ruiz, PT [facility]   6/10/2021  5:45 PM Ladi, [address] [facility]   7/14/2021  4:45 PM Yan Friedman MD [facility] BS AMB
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
## 2021-05-24 ENCOUNTER — HOSPITAL ENCOUNTER (OUTPATIENT)
Dept: PHYSICAL THERAPY | Age: 41
Discharge: HOME | End: 2021-05-24
Attending: PHYSICAL MEDICINE & REHABILITATION
Payer: COMMERCIAL

## 2021-05-24 ENCOUNTER — OFFICE VISIT (OUTPATIENT)
Dept: ORTHOPEDIC SURGERY | Age: 41
End: 2021-05-24
Payer: COMMERCIAL

## 2021-05-24 VITALS
RESPIRATION RATE: 17 BRPM | HEIGHT: 68 IN | BODY MASS INDEX: 30.77 KG/M2 | HEART RATE: 100 BPM | DIASTOLIC BLOOD PRESSURE: 80 MMHG | WEIGHT: 203 LBS | OXYGEN SATURATION: 96 % | TEMPERATURE: 97.7 F | SYSTOLIC BLOOD PRESSURE: 124 MMHG

## 2021-05-24 DIAGNOSIS — M43.07 SPONDYLOLYSIS, LUMBOSACRAL: ICD-10-CM

## 2021-05-24 DIAGNOSIS — M54.16 LUMBAR NEURITIS: Primary | ICD-10-CM

## 2021-05-24 DIAGNOSIS — M51.36 DDD (DEGENERATIVE DISC DISEASE), LUMBAR: ICD-10-CM

## 2021-05-24 DIAGNOSIS — Q76.2 SPONDYLOLISTHESIS, CONGENITAL: ICD-10-CM

## 2021-05-24 PROCEDURE — 97112 NEUROMUSCULAR REEDUCATION: CPT

## 2021-05-24 PROCEDURE — 99214 OFFICE O/P EST MOD 30 MIN: CPT | Performed by: PHYSICAL MEDICINE & REHABILITATION

## 2021-05-24 PROCEDURE — 97110 THERAPEUTIC EXERCISES: CPT

## 2021-05-24 RX ORDER — PREGABALIN 150 MG/1
150 CAPSULE ORAL 2 TIMES DAILY
Qty: 60 CAPSULE | Refills: 1 | Status: SHIPPED | OUTPATIENT
Start: 2021-05-24 | End: 2021-08-16

## 2021-05-24 RX ORDER — TRAMADOL HYDROCHLORIDE 50 MG/1
50 TABLET ORAL
COMMUNITY

## 2021-05-24 RX ORDER — DULOXETIN HYDROCHLORIDE 30 MG/1
30 CAPSULE, DELAYED RELEASE ORAL DAILY
COMMUNITY
End: 2021-06-07 | Stop reason: ALTCHOICE

## 2021-05-24 RX ORDER — ARIPIPRAZOLE 5 MG/1
TABLET ORAL
COMMUNITY
Start: 2021-05-08 | End: 2021-07-14 | Stop reason: DRUGHIGH

## 2021-05-24 NOTE — LETTER
NOTIFICATION OF RETURN TO WORK / SCHOOL    5/24/2021 3:08 PM    Mr. Curry [phone]  . To Whom It May Concern:    Otto Greene was under the care of [facility] [address] today [phone]. Ms. Nelsy Escalera is to remain out of work until her follow up appointment on . If you have any questions please call the office at [phone].         Sincerely,      Fiorella Alvarez MD

## 2021-05-24 NOTE — LETTER
5/24/2021    Patient: Charles Gallagher   YOB: 1980   Date of Visit: 5/24/2021     Rosamaria Concepcion MD  [address]  [place] [employer]    Dear Rosamaria Concepcion MD,      Thank you for referring Mr. Jaquan Valenzuela to [facility] [address] [facility] for evaluation. My notes for this consultation are attached. If you have questions, please do not hesitate to call me. I look forward to following your patient along with you.       Sincerely,    Marine Strickland MD

## 2021-05-25 ENCOUNTER — VIRTUAL VISIT (OUTPATIENT)
Dept: NEUROLOGY | Age: 41
End: 2021-05-25
Payer: COMMERCIAL

## 2021-05-25 DIAGNOSIS — F90.2 ATTENTION DEFICIT HYPERACTIVITY DISORDER (ADHD), COMBINED TYPE: ICD-10-CM

## 2021-05-25 DIAGNOSIS — F41.8 ANXIETY WITH SOMATIC FEATURES: ICD-10-CM

## 2021-05-25 DIAGNOSIS — R41.81 AGE-RELATED COGNITIVE DECLINE: Primary | ICD-10-CM

## 2021-05-25 DIAGNOSIS — F43.10 PTSD (POST-TRAUMATIC STRESS DISORDER): ICD-10-CM

## 2021-05-25 DIAGNOSIS — F33.3 SEVERE EPISODE OF RECURRENT MAJOR DEPRESSIVE DISORDER, WITH PSYCHOTIC FEATURES (HCC): ICD-10-CM

## 2021-05-25 PROCEDURE — 90837 PSYTX W PT 60 MINUTES: CPT | Performed by: PSYCHOLOGIST

## 2021-05-25 NOTE — PROGRESS NOTES
Interactive Psychotherapy/office feedback        Interactive office feedback session with Mr. Nel Ta and his wife. I reviewed the results of the recent Neuropsychological Evaluation  including the observed areas of neurocognitive strengths and weaknesses. Education was provided regarding my diagnostic impressions, and treatment plan/options were discussed. I also answered numerous questions related to the clinical findings, including the various methods to improve cognition and mood. CBT, psychoeducation, and supportive psychotherapy techniques were utilized. Prior to seeing the patient I reviewed the records, including the previously completed report, the records in [place], and any updated visits from other providers since I saw the patient last.       Diagnoses:      1. Age-related cognitive decline  2. ADHD, combined type, provisional  3. Major Depressive Disorder, severe, with psychotic symptoms versus Bipolar I Disorder versus Schizoaffective Disorder  4. PTSD, severe and complex  5. Anxiety with somatic features R/O Somatization and Conversion Disorder      The patient will follow up with the referring provider, and reported being very pleased with the services provided. [facility]. [address]17 psychotherapy 60 Minutes    This note was created using voice recognition software. Despite editing, there may be syntax errors. Sandra Lozano is a 37 y.o. male who was evaluated by an audio-video encounter for concerns as above. Patient identification was verified prior to start of the visit. Pursuant to the emergency declaration under the [facility] 1135 waiver authority and the [employer] Act, this Virtual Visit was conducted, with patient's (and/or legal guardian's) consent, to reduce the patient's risk of exposure to COVID-19 and provide necessary medical care. Services were provided through a synchronous discussion virtually to substitute for in-person clinic visit. I was at home. The patient was at home.

## 2021-05-26 ENCOUNTER — HOSPITAL ENCOUNTER (OUTPATIENT)
Dept: PHYSICAL THERAPY | Age: 41
Discharge: HOME | End: 2021-05-26
Attending: PHYSICAL MEDICINE & REHABILITATION
Payer: COMMERCIAL

## 2021-05-26 PROCEDURE — 97110 THERAPEUTIC EXERCISES: CPT

## 2021-05-26 PROCEDURE — 97112 NEUROMUSCULAR REEDUCATION: CPT
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
## 2021-06-01 ENCOUNTER — OFFICE VISIT (OUTPATIENT)
Dept: NEUROLOGY | Age: 41
End: 2021-06-01
Payer: COMMERCIAL

## 2021-06-01 VITALS
BODY MASS INDEX: 31.96 KG/M2 | RESPIRATION RATE: 16 BRPM | OXYGEN SATURATION: 97 % | DIASTOLIC BLOOD PRESSURE: 80 MMHG | SYSTOLIC BLOOD PRESSURE: 108 MMHG | WEIGHT: 210.2 LBS | HEART RATE: 97 BPM

## 2021-06-01 DIAGNOSIS — F41.8 ANXIETY WITH SOMATIC FEATURES: ICD-10-CM

## 2021-06-01 DIAGNOSIS — R40.20 LOC (LOSS OF CONSCIOUSNESS) (HCC): Primary | ICD-10-CM

## 2021-06-01 DIAGNOSIS — F43.10 PTSD (POST-TRAUMATIC STRESS DISORDER): ICD-10-CM

## 2021-06-01 PROCEDURE — 99214 OFFICE O/P EST MOD 30 MIN: CPT | Performed by: NURSE PRACTITIONER
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
## 2021-06-01 NOTE — PROGRESS NOTES
Annmarie Song is a 37 y.o. male who is in the office today as a follow up. 1. Have you been to the ER, urgent care clinic since your last visit? Hospitalized since your last visit? No    2. Have you seen or consulted any other health care providers outside of the [address] [place] [address] since your last visit? Include any pap smears or colon screening.  No

## 2021-06-02 ENCOUNTER — OFFICE VISIT (OUTPATIENT)
Dept: ORTHOPEDIC SURGERY | Age: 41
End: 2021-06-02
Payer: COMMERCIAL

## 2021-06-02 ENCOUNTER — HOSPITAL ENCOUNTER (OUTPATIENT)
Dept: PHYSICAL THERAPY | Age: 41
Discharge: HOME | End: 2021-06-02
Attending: PHYSICAL MEDICINE & REHABILITATION
Payer: COMMERCIAL

## 2021-06-02 VITALS
BODY MASS INDEX: 30.51 KG/M2 | HEART RATE: 76 BPM | WEIGHT: 206 LBS | DIASTOLIC BLOOD PRESSURE: 83 MMHG | HEIGHT: 69 IN | SYSTOLIC BLOOD PRESSURE: 122 MMHG

## 2021-06-02 DIAGNOSIS — R20.2 NUMBNESS AND TINGLING OF BOTH LOWER EXTREMITIES: ICD-10-CM

## 2021-06-02 DIAGNOSIS — R20.2 NUMBNESS AND TINGLING OF BOTH LOWER EXTREMITIES: Primary | ICD-10-CM

## 2021-06-02 DIAGNOSIS — R20.0 NUMBNESS AND TINGLING OF BOTH LOWER EXTREMITIES: Primary | ICD-10-CM

## 2021-06-02 DIAGNOSIS — R20.0 NUMBNESS AND TINGLING OF BOTH LOWER EXTREMITIES: ICD-10-CM

## 2021-06-02 PROCEDURE — 97112 NEUROMUSCULAR REEDUCATION: CPT

## 2021-06-02 PROCEDURE — 97110 THERAPEUTIC EXERCISES: CPT

## 2021-06-02 PROCEDURE — 95886 MUSC TEST DONE W/N TEST COMP: CPT | Performed by: PHYSICAL MEDICINE & REHABILITATION

## 2021-06-02 PROCEDURE — 95910 NRV CNDJ TEST 7-8 STUDIES: CPT | Performed by: PHYSICAL MEDICINE & REHABILITATION

## 2021-06-02 NOTE — PROGRESS NOTES
[address]  [address], [address]  [address]  Phone: [phone]  Fax: [phone]        True Lucas  : 1980  PCP: Dora Paris MD  2021    ELECTROMYOGRAPHY AND NERVE CONDUCTION STUDIES    Rowan Aguayo was referred by Dr. Cary Blackwood for electrodiagnostic evaluation of BLE paraesthesia, R>L. NCV & EMG Findings:    All nerve conduction studies (as indicated in the following tables) were within normal limits. All left vs. right side differences were within normal limits. All examined muscles (as indicated in the following table) showed no evidence of electrical instability. INTERPRETATION    This was a normal nerve conduction and EMG study showing there to be no signs of neuropathy, myopathy, or radiculopathy in the nerves and muscles tested. CLINICAL INTERPRETATION    His electrodiagnostic findings do not appear to explain his bilateral back and leg symptoms. HISTORY OF PRESENT ILLNESS  Rowan Aguayo is a 37 y.o. male. Pt presents today for BLE EMG evaluation of BLE paraesthesia, R>L, in an L5/S1 distribution. He reports some weakness and fatigue in the lower extremities. He reports cramping in the posterior BLE. Lumbar MRI 3/5/21:  1.  No evidence of fracture. 2. L5/S1: Bilateral spondylolysis. Slight L5/S1 spondylolisthesis, slightly progressed from 2021 MRI. Moderate to severe left neural foramen stenosis without exiting nerve root deformity, not appreciably changed. 3. No canal or foramen stenosis at other levels. PAST MEDICAL HISTORY   Past Medical History:   Diagnosis Date    Allergic rhinitis     Body mass index (BMI) of 25.0 to 29.9     DJD (degenerative joint disease), cervical 2016    MRI confirmed, foramenal stenosis     History of echocardiogram 2014    EF 60%. No RWMA. RVSP 25 mmHg.       Left lower lobe pneumonia     Sleep apnea     CPAP use at times No past surgical history on file. Nikhil Morales MEDICATIONS      Current Outpatient Medications   Medication Sig Dispense Refill    ARIPiprazole (ABILIFY) 5 mg tablet TAKE 1 TABLET BY MOUTH EVERY NIGHT AT BEDTIME      DULoxetine (CYMBALTA) 30 mg capsule Take 30 mg by mouth daily.  traMADoL (ULTRAM) 50 mg tablet Take 50 mg by mouth every six (6) hours as needed for Pain.  pregabalin (Lyrica) 150 mg capsule Take 1 Capsule by mouth two (2) times a day. Max Daily Amount: 300 mg. 60 Capsule 1    meloxicam (MOBIC) 15 mg tablet Take 1 Tab by mouth daily. 30 Tab 0    DULoxetine (CYMBALTA) 60 mg capsule TAKE 1 CAPSULE BY MOUTH DAILY. START THIS AFTER A 7 DAY COURSE OF CYMBALTA 30 MG DAILY 30 Cap 0    pregabalin (Lyrica) 75 mg capsule Take 1 Cap by mouth two (2) times a day. Max Daily Amount: 150 mg. 60 Cap 1    traZODone (DESYREL) 50 mg tablet Take 1 Tab by mouth nightly. 30 Tab 5        ALLERGIES  No Known Allergies       SOCIAL HISTORY    Social History     Socioeconomic History    Marital status:      Spouse name: Not on file    Number of children: Not on file    Years of education: Not on file    Highest education level: Not on file   Occupational History    Occupation:  - computer work   Tobacco Use    Smoking status: Former Smoker     Packs/day: 0.10     Years: 15.00     Pack years: 1.50     Types: Cigarettes    Smokeless tobacco: Never Used    Tobacco comment: 2007   Vaping Use    Vaping Use: Never used   Substance and Sexual Activity    Alcohol use: Not Currently    Drug use: No    Sexual activity: Yes     Partners: Female     Social Determinants of Health     Financial Resource Strain:     Difficulty of Paying Living Expenses:    Food Insecurity:     Worried About Running Out of Food in the Last Year:     Ran Out of Food in the Last Year:    Transportation Needs:     Lack of Transportation (Medical):      Lack of Transportation (Non-Medical):    Physical Activity:  Days of Exercise per Week:     Minutes of Exercise per Session:    Stress:     Feeling of Stress :    Social Connections:     Frequency of Communication with Friends and Family:     Frequency of Social Gatherings with Friends and Family:     Attends Jain Services:     Active Member of Clubs or Organizations:     Attends Club or Organization Meetings:     Marital Status:        FAMILY HISTORY  Family History   Problem Relation Age of Onset    Hypertension Maternal Grandmother     Diabetes Maternal Grandmother     Breast Cancer Maternal Grandmother     Other Mother         DVT         PHYSICAL EXAMINATION  Visit Vitals  /83   Pulse 76   Ht 5' 9\" (1.753 m)   Wt 206 lb (93.4 kg)   BMI 30.42 kg/m²       Pain Assessment  5/24/2021   Location of Pain Back   Location Modifiers -   Severity of Pain 5   Quality of Pain Sharp; Aching   Quality of Pain Comment bilateral leg pain   Duration of Pain Persistent   Frequency of Pain Constant   Date Pain First Started -   Date Pain First Started Comment -   Aggravating Factors Exercise   Aggravating Factors Comment -   Limiting Behavior Some   Relieving Factors Heat;Ice   Relieving Factors Comment -   Result of Injury No   Work-Related Injury -   Type of Injury -           Constitutional:  Well developed, well nourished, in no acute distress. Psychiatric: Affect and mood are appropriate. Integumentary: No rashes or abrasions noted on exposed areas. SPINE/MUSCULOSKELETAL EXAM    On brief examination: Tenderness to palpation of lumbar paraspinals.       NCV & EMG Findings:  Nerve Conduction Studies  Anti Sensory Summary Table     Stim Site NR Peak (ms) Norm Peak (ms) O-P Amp (µV) Norm O-P Amp Site1 Site2 Delta-P (ms) Dist (cm) Fercho (m/s) Norm Fercho (m/s)   Left Sup Fibular Anti Sensory (Ant Lat Mall)   14 cm    3.5 <4.4 15.2 >5.0 14 cm Ant Lat Mall 3.5 14.0 40 >32   Right Sup Fibular Anti Sensory (Ant Lat Mall)   14 cm    3.9 <4.4 18.0 >5.0 14 cm Ant Lat Mall 3.9 14.0 36 >32   Left Sural Anti Sensory (Lat Mall)   Calf    3.7 <4.5 9.2 >4.0 Calf Lat Mall 3.7 14.0 38 >35   Right Sural Anti Sensory (Lat Mall)   Calf    3.5 <4.5 10.5 >4.0 Calf Lat Mall 3.5 14.0 40 >35     Motor Summary Table     Stim Site NR Onset (ms) Norm Onset (ms) O-P Amp (mV) Norm O-P Amp Site1 Site2 Delta-0 (ms) Dist (cm) Fercho (m/s) Norm Fercho (m/s)   Left Fibular Motor (Ext Dig Brev)   Ankle    4.5 <6.5 7.1 >1.3 B Fib Ankle 6.4 32.5 51 >38   B Fib    10.9  6.8  Poplt B Fib 1.2 6.0 50 >40   Poplt    12.1  6.7          Right Fibular Motor (Ext Dig Brev)   Ankle    4.9 <6.5 5.1 >1.3 B Fib Ankle 6.2 32.5 52 >38   B Fib    11.1  5.6  Poplt B Fib 1.2 5.0 42 >40   Poplt    12.3  5.4          Left Tibial Motor (Abd Dodson Brev)   Ankle    4.1 <6.1 11.1 >4.4 Knee Ankle 8.7 37.0 43 >39   Knee    12.8  7.1          Right Tibial Motor (Abd Dodson Brev)   Ankle    3.5 <6.1 12.3 >4.4 Knee Ankle 9.7 39.5 41 >39   Knee    13.2  10.7            EMG     Side Muscle Nerve Root Ins Act Fibs Psw Amp Dur Poly Recrt Int Frieda Lacey Comment   Right VastusMed Femoral L2-4 Nml Nml Nml Nml Nml 0 Nml Nml    Right AntTibialis Dp Br Fibular L4-5 Nml Nml Nml Nml Nml 0 Nml Nml    Right Gastroc Tibial S1-2 Nml Nml Nml Nml Nml 0 Nml Nml    Right ExtHallLong Dp Br Fibular L5, S1 Nml Nml Nml Nml Nml 0 Nml Nml    Right PostTibialis Tibial L5, S1 Nml Nml Nml Nml Nml 0 Nml Nml    Left VastusMed Femoral L2-4 Nml Nml Nml Nml Nml 0 Nml Nml    Left AntTibialis Dp Br Fibular L4-5 Nml Nml Nml Nml Nml 0 Nml Nml    Left Gastroc Tibial S1-2 Nml Nml Nml Nml Nml 0 Nml Nml    Left ExtHallLong Dp Br Fibular L5, S1 Nml Nml Nml Nml Nml 0 Nml Nml    Left PostTibialis Tibial L5, S1 Nml Nml Nml Nml Nml 0 Nml Nml    Right Lumbo Parasp Up Rami L1-2 Nml Nml Nml         Right Lumbo Parasp Mid Rami L3-4 Nml Nml Nml         Right Lumbo Parasp Low Rami L5-S1 Nml Nml Nml         Left Lumbo Parasp Up Rami L1-2 Nml Nml Nml         Left Lumbo Parasp Mid Rami L3-4 Nml Nml Nml Left Lumbo Parasp Low Rami L5-S1 Nml Nml Nml             Nerve Conduction Studies  Anti Sensory Left/Right Comparison     Stim Site L Lat (ms) R Lat (ms) L-R Lat (ms) L Amp (µV) R Amp (µV) L-R Amp (%) Site1 Site2 L Fercho (m/s) R Fercho (m/s) L-R Fercho (m/s)   Sup Fibular Anti Sensory (Ant Lat Mall)   14 cm 3.5 3.9 0.4 15.2 18.0 15.6 14 cm Ant Lat Mall 40 36 4   Sural Anti Sensory (Lat Mall)   Calf 3.7 3.5 0.2 9.2 10.5 12.4 Calf Lat Mall 38 40 2     Motor Left/Right Comparison     Stim Site L Lat (ms) R Lat (ms) L-R Lat (ms) L Amp (mV) R Amp (mV) L-R Amp (%) Site1 Site2 L Fercho (m/s) R Fercho (m/s) L-R Fercho (m/s)   Fibular Motor (Ext Dig Brev)   Ankle 4.5 4.9 0.4 7.1 5.1 28.2 B Fib Ankle 51 52 1   B Fib 10.9 11.1 0.2 6.8 5.6 17.6 Poplt B Fib 50 42 8   Poplt 12.1 12.3 0.2 6.7 5.4 19.4        Tibial Motor (Abd Dodson Brev)   Ankle 4.1 3.5 0.6 11.1 12.3 9.8 Knee Ankle 43 41 2   Knee 12.8 13.2 0.4 7.1 10.7 33.6              Waveforms:                            [facility]  OFFICE PROCEDURE PROGRESS NOTE        Chart reviewed for the following:   Jackson HOBSON, have reviewed the History, Physical and updated the Allergic reactions for [address] performed immediately prior to start of procedure:   Jackson HOBSON, have performed the following reviews on [address] [place] [address] prior to the start of the procedure:            * Patient was identified by name and date of birth   * Agreement on procedure being performed was verified  * Risks and Benefits explained to the patient  * Procedure site verified and marked as necessary  * Patient was positioned for comfort  * Consent was signed and verified     Time: 4:02 PM    Date of procedure: 6/2/2021    Procedure performed by:  Garcia Mulligan MD    Provider accompanied by: Chiara. Patient accompanied by: Wife.     How tolerated by patient: tolerated the procedure well with no complications    Post Procedural Pain Scale: 0 - No Hurt    Comments: none    Written by Erica Ramon as dictated by Mina Zaman MD

## 2021-06-02 NOTE — PROGRESS NOTES
PT DAILY TREATMENT NOTE     Patient Name: Sandra Senior  Date:2021  : 1980  [x]  Patient  Verified  Payor: Khari Winston / Plan: Liam Cardenas / Product Type: HMO /    In time:545  Out time:636  Total Treatment Time (min): 51  Visit #: 8 of 12    Medicare/BCBS Only   Total Timed Codes (min):  41 1:1 Treatment Time:  41       Treatment Area: Low back pain [M54.5]    SUBJECTIVE  Pain Level (0-10 scale): 4  Any medication changes, allergies to medications, adverse drug reactions, diagnosis change, or new procedure performed?: [x] No    [] Yes (see summary sheet for update)  Subjective functional status/changes:   [] No changes reported  Patient reports receival of LE EMG/NCV study this afternoon with results reportedly WNL. OBJECTIVE    Modality rationale: decrease pain, increase tissue extensibility and increase muscle contraction/control to improve the patients ability to improve ease with sleep   Min Type Additional Details   10 []  Ice     [x]  heat  []  Ice massage Position: Seated  Location: Lumbar, Post-Tx     25 min Therapeutic Exercise:  [x]? ?? See flow sheet: Emphasis placed on improving available lumbar and LE AROM and strength   Rationale: increase ROM and increase strength to improve the patients ability to improve ease with ADL performance     16 min Neuromuscular Re-education:  [x]? ??  See flow sheet : Emphasis placed on improving activation and recruitment of the anterior abdominal and gluteal musculature and improving spinal proprioceptive and kinesthetic awareness   Rationale: increase ROM, increase strength, improve balance and increase proprioception  to improve the patients ability to improve ease with functional transfers.         With   [] TE   [] TA   [] neuro   [] other: Patient Education: [x] Review HEP    [] Progressed/Changed HEP based on:   [] positioning   [] body mechanics   [] transfers   [] heat/ice application    [] other:      Other Objective/Functional Measures:   TUG (10 feet, with SPC)= 12 sec     Pain Level (0-10 scale) post treatment: 5    ASSESSMENT/Changes in Function: With observation patient noted to ambulate with a more erect posture with reduced reliance on AD with ambulation. With ability to further progress weightbearing exercises with good tolerance but continued diminished balance confidence observed within session thus limiting extent of progression able to be tolerated. Patient will continue to benefit from skilled PT services to modify and progress therapeutic interventions, address functional mobility deficits, address ROM deficits, address strength deficits, analyze and address soft tissue restrictions, analyze and cue movement patterns, analyze and modify body mechanics/ergonomics, assess and modify postural abnormalities, address imbalance/dizziness and instruct in home and community integration to attain remaining goals. []  See Plan of Care  []  See progress note/recertification  []  See Discharge Summary         Progress towards goals / Updated goals:    Short Term Goals: To be accomplished in 3 weeks:  1. Patient will subjectively report full compliance with prescribed HEP. Eval: HEP provided  Current: Progressing, Patient intiated program, however not fully compliant at this time, 6/2/2021  2. Patient will demonstrate supine left/right hip flexion MMT >/= 4+/5 to improve ease with bed mobility and dressing. Eval: Supine Left Hip Flexion MMT = 2+/5, Supine Right Hip Flexion MMT = 2+/5  Current: Remains, Supine Left Hip Flexion MMT = 2+/5, Supine Right Hip Flexion MMT = 2+/5, 5/18/2021   3. Patient will demonstrate ability to perform sit-to-stand from chair without UE assistance to improve ease with functional transfers.   Eval: Sit-to-Stand (chair) = Inability to perform   Current: Progressing, Sit-to-Stand (chair) = Ability to perform with CGA without UE assist with multiple attempts required to perform (poor motor planning), 6/2/2021     Long Term Goals: To be accomplished in 6 weeks:  1. Patient will demonstrate a significant functional improvement as demonstrated by a score of >/= 42 on FOTO. Eval: FOTO = 16       2. Patient will demonstrate Rhomberg (EC) >/= 30 seconds to demonstrate a reduced falls risk. Eval: Rhomberg (EC) = 8 sec  Current: Progressing, Rhomberg (EC) = 30 sec (moderate sway), 6/2/2021  3. Patient will demonstrate TUG (10 feet, no AD) </= 15 seconds to demonstrate a reduced falls risk.   Eval: Inability to assess  Current: Progressing, TUG (10 feet, with SPC)= 12 sec, 6/2/21    PLAN  [x]  Upgrade activities as tolerated     [x]  Continue plan of care  []  Update interventions per flow sheet       []  Discharge due to:_  []  Other:_      Ramu Fuentes, PT 6/2/2021  8:46 AM    Future Appointments   Date Time Provider Qian Radha   6/2/2021  2:30 PM Heath Cohn MD [facility] AM[facility]   6/2/2021  5:45 PM Afia Worthington, PT [facility]   6/3/2021  5:00 PM [place], [address] [facility]   6/7/2021  3:00 PM Erwin Mata MD [facility]   6/8/2021  5:45 PM Mikayla Mann, PT [facility]   6/10/2021  5:45 PM [place], [address] [facility]   7/14/2021  4:45 PM Gary David MD \[facility]\"" [facility]

## 2021-06-03 ENCOUNTER — HOSPITAL ENCOUNTER (OUTPATIENT)
Dept: PHYSICAL THERAPY | Age: 41
Discharge: HOME | End: 2021-06-03
Attending: PHYSICAL MEDICINE & REHABILITATION
Payer: COMMERCIAL

## 2021-06-03 PROCEDURE — 97110 THERAPEUTIC EXERCISES: CPT

## 2021-06-03 PROCEDURE — 97112 NEUROMUSCULAR REEDUCATION: CPT

## 2021-06-03 NOTE — PROGRESS NOTES
PT DAILY TREATMENT NOTE     Patient Name: Mayi Hanna  Date:6/3/2021  : 1980  [x]  Patient  Verified  Payor: Otoniel Boone / Plan: Jaime Paul / Product Type: HMO /    In time:508  Out time:601  Total Treatment Time (min): 53  Visit #: 9 of 12    Medicare/BCBS Only   Total Timed Codes (min):  43 1:1 Treatment Time:  38       Treatment Area: Low back pain [M54.5]    SUBJECTIVE  Pain Level (0-10 scale): 7  Any medication changes, allergies to medications, adverse drug reactions, diagnosis change, or new procedure performed?: [x] No    [] Yes (see summary sheet for update)  Subjective functional status/changes:   [] No changes reported  Patient reports soreness without increase in pain after last treatment session. OBJECTIVE    Modality rationale: decrease inflammation and decrease pain to improve the patients ability to improve ease with sleep    Min Type Additional Details   10 [x]  Ice     []  heat  []  Ice massage Position: Supine  Location: Lumbar, Post-Tx     23 min Therapeutic Exercise:  [x]? ??? See flow sheet: Emphasis placed on improving available lumbar and LE AROM and strength   Rationale: increase ROM and increase strength to improve the patients ability to improve ease with ADL performance     20 min Neuromuscular Re-education:  [x]? ???  See flow sheet : Emphasis placed on improving activation and recruitment of the anterior abdominal and gluteal musculature and improving spinal proprioceptive and kinesthetic awareness   Rationale: increase ROM, increase strength, improve balance and increase proprioception  to improve the patients ability to improve ease with functional transfers.         With   [] TE   [] TA   [] neuro   [] other: Patient Education: [x] Review HEP    [] Progressed/Changed HEP based on:   [] positioning   [] body mechanics   [] transfers   [] heat/ice application    [] other:      Other Objective/Functional Measures: See goals below.       Pain Level (0-10 scale) post treatment: 7    ASSESSMENT/Changes in Function: Objectively patient has demonstrated a significant improvement in LE strength and static and dynamic stability as demonstrated by functional goal assessment but continues to demonstrate aberrant movement patterns with altered motor planning and movement coordination not consistent with normal neurological screen noted at time of initial evaluation. Patient demonstrates observationally a more erect posture with ambulation with transition to use of SPC but with continued use of FWW with long-distance ambulation. With greater activity tolerance demonstrated within session as demonstrated by ability to progress through increase in repetitions and intensity of exercises performed. Patient will continue to benefit from skilled PT services to modify and progress therapeutic interventions, address functional mobility deficits, address ROM deficits, address strength deficits, analyze and address soft tissue restrictions, analyze and cue movement patterns, analyze and modify body mechanics/ergonomics, assess and modify postural abnormalities, address imbalance/dizziness and instruct in home and community integration to attain remaining goals. []  See Plan of Care  [x]  See progress note/recertification  []  See Discharge Summary         Progress towards goals / Updated goals:    Short Term Goals: To be accomplished in 3 weeks:  1. Patient will subjectively report full compliance with prescribed HEP. Eval: HEP provided  Current: Progressing, Patient intiated program, however not fully compliant at this time, 6/2/2021  2. Patient will demonstrate supine left/right hip flexion MMT >/= 4+/5 to improve ease with bed mobility and dressing.   Eval: Supine Left Hip Flexion MMT = 2+/5, Supine Right Hip Flexion MMT = 2+/5  Current: Remains, Supine Left Hip Flexion MMT = 2+/5, Supine Right Hip Flexion MMT = 2+/5, 5/18/2021   3. Patient will demonstrate ability to perform sit-to-stand from chair without UE assistance to improve ease with functional transfers. Eval: Sit-to-Stand (chair) = Inability to perform   Current: Progressing, Sit-to-Stand (chair) = Ability to perform with CGA without UE assist with multiple attempts required to perform (poor motor planning), 6/2/2021     Long Term Goals: To be accomplished in 6 weeks:  1. Patient will demonstrate a significant functional improvement as demonstrated by a score of >/= 42 on FOTO. Eval: FOTO = 16        Current: Progressing, FOTO = 36, 6/3/2021  2. Patient will demonstrate Rhomberg (EC) >/= 30 seconds to demonstrate a reduced falls risk. Eval: Rhomberg (EC) = 8 sec  Current: Progressing, Rhomberg (EC) = 30 sec (moderate sway), 6/2/2021  3. Patient will demonstrate TUG (10 feet, no AD) </= 15 seconds to demonstrate a reduced falls risk.   Eval: Inability to assess  Current: Progressing, TUG (10 feet, with SPC)= 12 sec, 6/2/21    PLAN  [x]  Upgrade activities as tolerated     [x]  Continue plan of care  []  Update interventions per flow sheet       []  Discharge due to:_  []  Other:_      Aramis Camp, PT 6/3/2021  10:45 AM    Future Appointments   Date Time Provider Qian Lunai   6/7/2021  3:00 PM Jen Smith MD [facility]   6/8/2021  5:45 PM Minor Zapata PT [facility]   6/10/2021  5:45 PM Josiah Blake, PT [facility]   6/16/2021  5:45 PM Josiah Blake, PT [facility]   6/17/2021  5:45 PM Josiah Blake, PT [facility]   6/21/2021  5:00 PM Kylah Tyson [facility]   6/23/2021  5:00 PM Ilir Colbert DPT [facility]   7/14/2021  4:45 PM Jodie Blackman MD [facility] BS AMB
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
## 2021-06-07 ENCOUNTER — OFFICE VISIT (OUTPATIENT)
Dept: ORTHOPEDIC SURGERY | Age: 41
End: 2021-06-07
Payer: COMMERCIAL

## 2021-06-07 VITALS
WEIGHT: 206 LBS | BODY MASS INDEX: 30.42 KG/M2 | OXYGEN SATURATION: 97 % | TEMPERATURE: 98.5 F | RESPIRATION RATE: 19 BRPM | HEART RATE: 100 BPM | DIASTOLIC BLOOD PRESSURE: 87 MMHG | SYSTOLIC BLOOD PRESSURE: 126 MMHG

## 2021-06-07 DIAGNOSIS — M43.07 SPONDYLOLYSIS, LUMBOSACRAL: ICD-10-CM

## 2021-06-07 DIAGNOSIS — Q76.2 SPONDYLOLISTHESIS, CONGENITAL: ICD-10-CM

## 2021-06-07 DIAGNOSIS — M54.16 LUMBAR NEURITIS: Primary | ICD-10-CM

## 2021-06-07 DIAGNOSIS — M51.36 DDD (DEGENERATIVE DISC DISEASE), LUMBAR: ICD-10-CM

## 2021-06-07 PROCEDURE — 99213 OFFICE O/P EST LOW 20 MIN: CPT | Performed by: PHYSICAL MEDICINE & REHABILITATION

## 2021-06-07 NOTE — LETTER
6/7/2021    Patient: Alline Samples   YOB: 1980   Date of Visit: 6/7/2021     Frances Richardson MD  [address] [facility] [address]  [place] [employer]    Dear Frances Richardson MD,      Thank you for referring Mr. Saulo Concepcion to [facility] [address] for evaluation. My notes for this consultation are attached. If you have questions, please do not hesitate to call me. I look forward to following your patient along with you.       Sincerely,    Guillermina Duke MD

## 2021-06-07 NOTE — LETTER
NOTIFICATION OF RETURN TO WORK / SCHOOL    6/7/2021 2:51 PM    Mr. Curry [phone]  . To Whom It May Concern:    Santo Batres was under the care of [facility] [address] today [phone]. Mr. Clarita Aguilar is to remain out of work until his follow up appointment on 07.12.2021. If you have any questions please call the office at [phone].        Sincerely,      Lacy Duverney, MD

## 2021-06-08 ENCOUNTER — TELEPHONE (OUTPATIENT)
Dept: ORTHOPEDIC SURGERY | Age: 41
End: 2021-06-08

## 2021-06-08 ENCOUNTER — HOSPITAL ENCOUNTER (OUTPATIENT)
Dept: PHYSICAL THERAPY | Age: 41
Discharge: HOME | End: 2021-06-08
Attending: PHYSICAL MEDICINE & REHABILITATION
Payer: COMMERCIAL

## 2021-06-08 DIAGNOSIS — Q76.2 SPONDYLOLISTHESIS, CONGENITAL: Primary | ICD-10-CM

## 2021-06-08 PROCEDURE — 97112 NEUROMUSCULAR REEDUCATION: CPT | Performed by: PHYSICAL THERAPIST

## 2021-06-08 PROCEDURE — 97110 THERAPEUTIC EXERCISES: CPT | Performed by: PHYSICAL THERAPIST

## 2021-06-08 RX ORDER — DIAZEPAM 10 MG/1
TABLET ORAL
Qty: 1 TABLET | Refills: 0 | Status: SHIPPED | OUTPATIENT
Start: 2021-06-08 | End: 2021-07-14

## 2021-06-08 NOTE — PROGRESS NOTES
PT DAILY TREATMENT NOTE     Patient Name: Chris Lucio  Date:2021  : 1980  [x]  Patient  Verified  Payor: Estrellita Anders / Plan: Julián Escalera / Product Type: HMO /    In time:5:31  Out time:6:31  Total Treatment Time (min): 60  Visit #: 1 of 8    Medicare/BCBS Only   Total Timed Codes (min):  50 1:1 Treatment Time:  50       Treatment Area: Low back pain [M54.5]    SUBJECTIVE  Pain Level (0-10 scale): 7  Any medication changes, allergies to medications, adverse drug reactions, diagnosis change, or new procedure performed?: [x] No    [] Yes (see summary sheet for update)  Subjective functional status/changes:   [] No changes reported  Patient reports he had increased back pain today. He notes pain in his leg and in his foot make stairs difficult. He states he is avoiding stairs at home. OBJECTIVE    Modality rationale: decrease pain to improve the patients ability to increase tolerance to activity.    Min Type Additional Details    [] Estim:  []Unatt       []IFC  []Premod                        []Other:  []w/ice   []w/heat  Position:  Location:    [] Estim: []Att    []TENS instruct  []NMES                    []Other:  []w/US   []w/ice   []w/heat  Position:  Location:    []  Traction: [] Cervical       []Lumbar                       [] Prone          []Supine                       []Intermittent   []Continuous Lbs:  [] before manual  [] after manual    []  Ultrasound: []Continuous   [] Pulsed                           []1MHz   []3MHz W/cm2:  Location:    []  Iontophoresis with dexamethasone         Location: [] Take home patch   [] In clinic   10 []  Ice     [x]  heat  []  Ice massage  []  Laser   []  Anodyne Position:sitting  Location:lower back    []  Laser with stim  []  Other:  Position:  Location:    []  Vasopneumatic Device  Pre-treatment girth:  Post-treatment girth:  Measured at (location):  Pressure:       [] lo [] med [] hi   Temperature: [] lo [] med [] hi   [] Skin assessment post-treatment:  []intact []redness- no adverse reaction    []redness  adverse reaction:       25 min Therapeutic Exercise:  [] See flow sheet :Emphasis placed on improving available lumbar and LE AROM and strength   Rationale: increase ROM and increase strength to improve the patients ability to increase ease with ADL performance. 25 min Neuromuscular Re-education:  []  See flow sheet :Emphasis placed on improving activation and recruitment of the anterior abdominal and gluteal musculature and improving spinal proprioceptive and kinesthetic awareness   Rationale: increase ROM, increase strength, improve balance and increase proprioception  to improve the patients ability to increase ease with functional transfers. With   [] TE   [] TA   [] neuro   [] other: Patient Education: [x] Review HEP    [] Progressed/Changed HEP based on:   [] positioning   [] body mechanics   [] transfers   [] heat/ice application    [] other:      Other Objective/Functional Measures: Patient ambulates with SPC with decreased jenna. Pain Level (0-10 scale) post treatment: 5    ASSESSMENT/Changes in Function: Patient continues with back and leg pain affecting his functional activity level. Patient will continue to benefit from skilled PT services to modify and progress therapeutic interventions, address functional mobility deficits, address ROM deficits, address strength deficits, analyze and address soft tissue restrictions, analyze and cue movement patterns, analyze and modify body mechanics/ergonomics, assess and modify postural abnormalities, address imbalance/dizziness and instruct in home and community integration to attain remaining goals. [x]  See Plan of Care  []  See progress note/recertification  []  See Discharge Summary         Progress towards goals / Updated goals:  Short Term Goals: To be accomplished in 3 weeks:  1. Patient will subjectively report full compliance with prescribed HEP.   Eval: HEP provided  Current: Progressing, Patient intiated program, however not fully compliant at this time, 6/2/2021  2. Patient will demonstrate supine left/right hip flexion MMT >/= 4+/5 to improve ease with bed mobility and dressing. Eval: Supine Left Hip Flexion MMT = 2+/5, Supine Right Hip Flexion MMT = 2+/5  Current: Remains, Supine Left Hip Flexion MMT = 2+/5, Supine Right Hip Flexion MMT = 2+/5, 5/18/2021   3. Patient will demonstrate ability to perform sit-to-stand from chair without UE assistance to improve ease with functional transfers. Eval: Sit-to-Stand (chair) = Inability to perform   Current: Progressing, Sit-to-Stand (chair) = Ability to perform with CGA without UE assist with multiple attempts required to perform (poor motor planning), 6/2/2021     Long Term Goals: To be accomplished in 6 weeks:  1. Patient will demonstrate a significant functional improvement as demonstrated by a score of >/= 42 on FOTO. Eval: FOTO = 16        Current: Progressing, FOTO = 36, 6/3/2021  2. Patient will demonstrate Rhomberg (EC) >/= 30 seconds to demonstrate a reduced falls risk. Eval: Rhomberg (EC) = 8 sec  Current: Progressing, Rhomberg (EC) = 30 sec (moderate sway), 6/2/2021  3. Patient will demonstrate TUG (10 feet, no AD) </= 15 seconds to demonstrate a reduced falls risk.   Eval: Inability to assess  Current: Progressing, TUG (10 feet, with SPC)= 12 sec, 6/2/21    PLAN  [x]  Upgrade activities as tolerated     [x]  Continue plan of care  []  Update interventions per flow sheet       []  Discharge due to:_  []  Other:_      Toribio Welsh, PT 6/8/2021  5:30 PM    Future Appointments   Date Time Provider Qian Garcia   6/8/2021  5:45 PM Melissa Espinoza, PT [facility]   6/10/2021  5:45 PM Lucy Gonzales, PT [facility]   6/16/2021  5:45 PM Lucy Gonzales, PT [facility]   6/17/2021  5:45 PM Lucy Gonzales, PT [facility]   6/21/2021  5:00 PM Jennifer France PTA [facility]   6/23/2021  5:00 PM David Mia Hernandez, DPT [facility]   7/12/2021  1:20 PM Sandra Aguayo MD [facility]   7/14/2021  4:45 PM Kristal Todd MD [facility]

## 2021-06-10 ENCOUNTER — HOSPITAL ENCOUNTER (OUTPATIENT)
Dept: PHYSICAL THERAPY | Age: 41
Discharge: HOME | End: 2021-06-10
Attending: PHYSICAL MEDICINE & REHABILITATION
Payer: COMMERCIAL

## 2021-06-10 PROCEDURE — 97110 THERAPEUTIC EXERCISES: CPT

## 2021-06-10 PROCEDURE — 97112 NEUROMUSCULAR REEDUCATION: CPT

## 2021-06-10 NOTE — PROGRESS NOTES
PT DAILY TREATMENT NOTE     Patient Name: Aakash Zamorano  Date:6/10/2021  : 1980  [x]  Patient  Verified  Payor: Emory Mcgrath / Plan: John Aver / Product Type: HMO /    In time:538  Out time:635  Total Treatment Time (min): 62  Visit #: 2 of 8    Medicare/BCBS Only   Total Timed Codes (min):  47 1:1 Treatment Time:  47       Treatment Area: Low back pain [M54.5]    SUBJECTIVE  Pain Level (0-10 scale): 7  Any medication changes, allergies to medications, adverse drug reactions, diagnosis change, or new procedure performed?: [x] No    [] Yes (see summary sheet for update)  Subjective functional status/changes:   [] No changes reported  Patient reports noting \"soreness\" to bilateral legs with patient unsure regarding the reason for soreness. Patient reports plan to receive second epidural 6/15/2021. OBJECTIVE    Modality rationale: decrease inflammation and decrease pain to improve the patients ability to improve ease with sleep    Min Type Additional Details    10 []? Ice     [x]? heat  []? Ice massage Position: Supine  Location: Lumbar, Post-Tx       24 min Therapeutic Exercise:  [x]? ???? See flow sheet: Emphasis placed on improving available lumbar and LE AROM and strength   Rationale: increase ROM and increase strength to improve the patients ability to improve ease with ADL performance     23 min Neuromuscular Re-education:  [x]? ????  See flow sheet : Emphasis placed on improving activation and recruitment of the anterior abdominal and gluteal musculature and improving spinal proprioceptive and kinesthetic awareness   Rationale: increase ROM, increase strength, improve balance and increase proprioception  to improve the patients ability to improve ease with functional transfers.         With   [] TE   [] TA   [] neuro   [] other: Patient Education: [x] Review HEP    [] Progressed/Changed HEP based on:   [] positioning   [] body mechanics   [] transfers   [] heat/ice application [] other:      Other Objective/Functional Measures:   Sit-to-Stand (table at knee height) = Ability to perform independently without UE assist with good motor planning     Pain Level (0-10 scale) post treatment: 8    ASSESSMENT/Changes in Function: With continued use of a graded exercise protocol to improve exercise tolerance and improve balance self-confidence to aid patient in full return to PLOF. Patient continues to demonstrate poor motor planning and movement coordination, although with observational improvements noted within session with patient demonstrating ability to ascend/descend stairs with a reciprocal gait pattern with single UE assist and SPC. Patient will continue to benefit from skilled PT services to modify and progress therapeutic interventions, address functional mobility deficits, address ROM deficits, address strength deficits, analyze and address soft tissue restrictions, analyze and cue movement patterns, analyze and modify body mechanics/ergonomics, assess and modify postural abnormalities, address imbalance/dizziness and instruct in home and community integration to attain remaining goals. []  See Plan of Care  []  See progress note/recertification  []  See Discharge Summary         Progress towards goals / Updated goals:    Short Term Goals: To be accomplished in 3 weeks:  1. Patient will subjectively report full compliance with prescribed HEP. At Last PN: Progressing, Patient intiated program, however not fully compliant at this time, 6/2/2021  2. Patient will demonstrate supine left/right hip flexion MMT >/= 4+/5 to improve ease with bed mobility and dressing. At Last PN: Remains, Supine Left Hip Flexion MMT = 2+/5, Supine Right Hip Flexion MMT = 2+/5, 5/18/2021   3. Patient will demonstrate ability to perform sit-to-stand from chair without UE assistance to improve ease with functional transfers.   At Last PN: Progressing, Sit-to-Stand (chair) = Ability to perform with CGA without UE assist with multiple attempts required to perform (poor motor planning), 6/2/2021  Current: Progressing, Sit-to-Stand (table at knee height) = Ability to perform independently without UE assist with good motor planning, 6/10/2021     Long Term Goals: To be accomplished in 6 weeks:  1. Patient will demonstrate a significant functional improvement as demonstrated by a score of >/= 42 on FOTO. At Last PN: Dania Peterson = 36, 6/3/2021  2. Patient will demonstrate Rhomberg (EC) >/= 30 seconds to demonstrate a reduced falls risk. At Last PN: Progressing, Rhomberg (EC) = 30 sec (moderate sway), 6/2/2021  3. Patient will demonstrate TUG (10 feet, no AD) </= 15 seconds to demonstrate a reduced falls risk.   At Last PN: Progressing, TUG (10 feet, with SPC)= 12 sec, 6/2/21    PLAN  [x]  Upgrade activities as tolerated     [x]  Continue plan of care  []  Update interventions per flow sheet       []  Discharge due to:_  []  Other:_      Jackson Huynh, PT 6/10/2021  10:05 AM    Future Appointments   Date Time Provider Qian Radha   6/10/2021  5:45 PM Bikkers, [address] [facility]   6/16/2021  5:45 PM Bikkers, [address] [facility]   6/17/2021  5:45 PM Bikkers, [address] [facility]   6/21/2021  5:00 PM Rodney Denton [facility]   6/23/2021  5:00 PM Julian Thompson DPT [facility]   7/12/2021  1:20 PM MD MAIRA Poe BS AMB   7/14/2021  4:45 PM Dora Paris MD [facility] BS AMB

## 2021-06-11 ENCOUNTER — DOCUMENTATION ONLY (OUTPATIENT)
Dept: ORTHOPEDIC SURGERY | Age: 41
End: 2021-06-11

## 2021-06-11 NOTE — PROGRESS NOTES
Received ck [id number] from keanu wolfe for a form fee. [employer] has already paid the form fee. Check will be mailed back to keanu wolfe today. Documents sent to be scanned to chart.

## 2021-06-16 ENCOUNTER — HOSPITAL ENCOUNTER (OUTPATIENT)
Dept: PHYSICAL THERAPY | Age: 41
Discharge: HOME | End: 2021-06-16
Attending: PHYSICAL MEDICINE & REHABILITATION
Payer: COMMERCIAL

## 2021-06-16 PROCEDURE — 97110 THERAPEUTIC EXERCISES: CPT

## 2021-06-16 PROCEDURE — 97112 NEUROMUSCULAR REEDUCATION: CPT

## 2021-06-16 NOTE — PROGRESS NOTES
PT DAILY TREATMENT NOTE     Patient Name: Flako Villanueva  Date:2021  : 1980  [x]  Patient  Verified  Payor: Jimmy Chahal / Plan: Loli Danger / Product Type: HMO /    In time:544  Out time:634  Total Treatment Time (min): 50  Visit #: 3 of 8    Medicare/BCBS Only   Total Timed Codes (min):  40 1:1 Treatment Time:  40       Treatment Area: Low back pain [M54.5]    SUBJECTIVE  Pain Level (0-10 scale): 5  Any medication changes, allergies to medications, adverse drug reactions, diagnosis change, or new procedure performed?: [x] No    [] Yes (see summary sheet for update)  Subjective functional status/changes:   [] No changes reported  Patient reports receival of lumbar epidural yesterday with significant reduction in symptoms afterwards. Patient reports that he continues to not drive but does state that he has been running his own errands. Patient reports use of walker only ~3x/week with use with increase in pain. OBJECTIVE          Modality rationale: decrease inflammation and decrease pain to improve the patients ability to improve ease with sleep    Min Type Additional Details     10 []??  Ice     [x]? ?  heat  []? ?  Ice massage Position: Supine  Location: Lumbar, Post-Tx        25 min Therapeutic Exercise:  [x]?????? See flow sheet: Emphasis placed on improving available lumbar and LE AROM and strength   Rationale: increase ROM and increase strength to improve the patients ability to improve ease with ADL performance     15 min Neuromuscular Re-education:  [x]??????  See flow sheet : Emphasis placed on improving activation and recruitment of the anterior abdominal and gluteal musculature and improving spinal proprioceptive and kinesthetic awareness   Rationale: increase ROM, increase strength, improve balance and increase proprioception  to improve the patients ability to improve ease with functional transfers.         With   [] TE   [] TA   [] neuro   [] other: Patient Education: [x] Review HEP    [] Progressed/Changed HEP based on:   [] positioning   [] body mechanics   [] transfers   [] heat/ice application    [] other:      Other Objective/Functional Measures:   Sit-to-Stand (chair) = Ability to perform independently without UE assist with good motor planning     Pain Level (0-10 scale) post treatment: 4    ASSESSMENT/Changes in Function: With improved LE motor control demonstrated with patient demonstrating ability to perform 6\" step-ups bilaterally with correct mechanics and good motor planning without UE compensation. With initiation of dynamic gait to promote dynamic stability to improve self-balance confidence. Patient will continue to benefit from skilled PT services to modify and progress therapeutic interventions, address functional mobility deficits, address ROM deficits, address strength deficits, analyze and address soft tissue restrictions, analyze and cue movement patterns, analyze and modify body mechanics/ergonomics, assess and modify postural abnormalities, address imbalance/dizziness and instruct in home and community integration to attain remaining goals. []  See Plan of Care  []  See progress note/recertification  []  See Discharge Summary         Progress towards goals / Updated goals:    Short Term Goals: To be accomplished in 3 weeks:  1. Patient will subjectively report full compliance with prescribed HEP. At Last PN: Progressing, Patient intiated program, however not fully compliant at this time, 6/2/2021  Current: Progressing, HEP performance reported 1-2x/day, 6/16/2021  2. Patient will demonstrate supine left/right hip flexion MMT >/= 4+/5 to improve ease with bed mobility and dressing. At Last PN: Remains, Supine Left Hip Flexion MMT = 2+/5, Supine Right Hip Flexion MMT = 2+/5, 5/18/2021   3. Patient will demonstrate ability to perform sit-to-stand from chair without UE assistance to improve ease with functional transfers.   At Last PN: Progressing, Sit-to-Stand (chair) = Ability to perform with CGA without UE assist with multiple attempts required to perform (poor motor planning), 6/2/2021  Current: Progressing, Sit-to-Stand (chair) = Ability to perform independently without UE assist with good motor planning, 6/16/2021     Long Term Goals: To be accomplished in 6 weeks:  1. Patient will demonstrate a significant functional improvement as demonstrated by a score of >/= 42 on FOTO. At Last PN: Abhilash Callahan = 36, 6/3/2021  2. Patient will demonstrate Rhomberg (EC) >/= 30 seconds to demonstrate a reduced falls risk. At Last PN: Progressing, Rhomberg (EC) = 30 sec (moderate sway), 6/2/2021  3. Patient will demonstrate TUG (10 feet, no AD) </= 15 seconds to demonstrate a reduced falls risk.   At Last PN: Progressing, TUG (10 feet, with SPC)= 12 sec, 6/2/21    PLAN  [x]  Upgrade activities as tolerated     [x]  Continue plan of care  []  Update interventions per flow sheet       []  Discharge due to:_  []  Other:_      Soren Newman, PT 6/16/2021  8:49 AM    Future Appointments   Date Time Provider Qian Radha   6/16/2021  5:45 PM Bikkers, [address] [facility]   6/17/2021  5:45 PM Bikkers, [address] [facility]   6/21/2021  5:00 PM Jace Hunt [facility]   6/23/2021  5:00 PM Lisandra Cabrera DPT [facility]   7/12/2021  1:20 PM MD MAIRA Cazares BS AMB   7/14/2021  4:45 PM MD AKIN Sosa BS AMB

## 2021-06-17 ENCOUNTER — HOSPITAL ENCOUNTER (OUTPATIENT)
Dept: PHYSICAL THERAPY | Age: 41
Discharge: HOME | End: 2021-06-17
Attending: PHYSICAL MEDICINE & REHABILITATION
Payer: COMMERCIAL

## 2021-06-17 PROCEDURE — 97112 NEUROMUSCULAR REEDUCATION: CPT

## 2021-06-17 PROCEDURE — 97110 THERAPEUTIC EXERCISES: CPT

## 2021-06-17 NOTE — PROGRESS NOTES
PT DAILY TREATMENT NOTE     Patient Name: Camron Joy  Date:2021  : 1980  [x]  Patient  Verified  Payor: Annette Johnson / Plan: Keon Juarez / Product Type: HMO /    In time:546  Out time:634  Total Treatment Time (min): 48  Visit #: 4 of 8    Medicare/BCBS Only   Total Timed Codes (min):  38 1:1 Treatment Time:  38       Treatment Area: Low back pain [M54.5]    SUBJECTIVE  Pain Level (0-10 scale): 4  Any medication changes, allergies to medications, adverse drug reactions, diagnosis change, or new procedure performed?: [x] No    [] Yes (see summary sheet for update)  Subjective functional status/changes:   [] No changes reported  Patient reports no adverse response to last treatment session with patient reporting continued positive benefits from epidural injection.      OBJECTIVE    Modality rationale: decrease pain and increase muscle contraction/control to improve the patients ability to improve ease with sleep   Min Type Additional Details   10 []  Ice     [x]  heat  []  Ice massage Position: Supine  Location: Lumbar, Post-tx      24 min Therapeutic Exercise:  [x]??????? See flow sheet: Emphasis placed on improving available lumbar and LE AROM and strength   Rationale: increase ROM and increase strength to improve the patients ability to improve ease with ADL performance     14 min Neuromuscular Re-education:  [x]???????  See flow sheet : Emphasis placed on improving activation and recruitment of the anterior abdominal and gluteal musculature and improving spinal proprioceptive and kinesthetic awareness   Rationale: increase ROM, increase strength, improve balance and increase proprioception  to improve the patients ability to improve ease with functional transfers.         With   [] TE   [] TA   [] neuro   [] other: Patient Education: [x] Review HEP    [] Progressed/Changed HEP based on:   [] positioning   [] body mechanics   [] transfers   [] heat/ice application    [] other: Other Objective/Functional Measures:   Supine Left Hip Flexion MMT = 5/5, Supine Right Hip Flexion MMT = 5/5     Pain Level (0-10 scale) post treatment: 5    ASSESSMENT/Changes in Function: With continued utilization of a graded exercise approach to improve activity tolerance and balance self-confidence with patient continuing to demonstrate apprehension. Observationally patient noticed to require less verbal encouragement with exercise performance with improved speed of performance with decreased hesitation with movement initiation. Patient will continue to benefit from skilled PT services to modify and progress therapeutic interventions, address functional mobility deficits, address ROM deficits, address strength deficits, analyze and address soft tissue restrictions, analyze and cue movement patterns, analyze and modify body mechanics/ergonomics, assess and modify postural abnormalities, address imbalance/dizziness and instruct in home and community integration to attain remaining goals. []  See Plan of Care  []  See progress note/recertification  []  See Discharge Summary         Progress towards goals / Updated goals:    Short Term Goals: To be accomplished in 3 weeks:  1. Patient will subjectively report full compliance with prescribed HEP. At Last PN: Progressing, Patient intiated program, however not fully compliant at this time, 6/2/2021  Current: Progressing, HEP performance reported 1-2x/day, 6/16/2021  2. Patient will demonstrate supine left/right hip flexion MMT >/= 4+/5 to improve ease with bed mobility and dressing. At Last PN: Remains, Supine Left Hip Flexion MMT = 2+/5, Supine Right Hip Flexion MMT = 2+/5, 5/18/2021   Current: Met, Supine Left Hip Flexion MMT = 5/5, Supine Right Hip Flexion MMT = 5/5, 6/17/2021  3. Patient will demonstrate ability to perform sit-to-stand from chair without UE assistance to improve ease with functional transfers.   At Last PN: Progressing, Sit-to-Stand (chair) = Ability to perform with CGA without UE assist with multiple attempts required to perform (poor motor planning), 6/2/2021  Current: Progressing, Sit-to-Stand (chair) = Ability to perform independently without UE assist with good motor planning, 6/16/2021     Long Term Goals: To be accomplished in 6 weeks:  1. Patient will demonstrate a significant functional improvement as demonstrated by a score of >/= 42 on FOTO. At Last PN: Progressing, FOTO = 36, 6/3/2021  2. Patient will demonstrate Rhomberg (EC) >/= 30 seconds to demonstrate a reduced falls risk. At Last PN: Progressing, Rhomberg (EC) = 30 sec (moderate sway), 6/2/2021  3. Patient will demonstrate TUG (10 feet, no AD) </= 15 seconds to demonstrate a reduced falls risk.   At Last PN: Progressing, TUG (10 feet, with SPC)= 12 sec, 6/2/21    PLAN  [x]  Upgrade activities as tolerated     [x]  Continue plan of care  []  Update interventions per flow sheet       []  Discharge due to:_  []  Other:_      Rohan Tsai, PT 6/17/2021  10:03 AM    Future Appointments   Date Time Provider Qian Garcia   6/17/2021  5:45 PM Ladi, [address] [facility]   6/21/2021  [facility]   6/23/2021  5:00 PM Alex Campuzano DPT [facility]   7/12/2021  1:20 PM Florinda aLguna MD MAIRA BS AMB   7/14/2021  4:45 PM Ivory Mancilla MD [facility] BS AMB

## 2021-06-21 ENCOUNTER — HOSPITAL ENCOUNTER (OUTPATIENT)
Dept: PHYSICAL THERAPY | Age: 41
Discharge: HOME | End: 2021-06-21
Attending: PHYSICAL MEDICINE & REHABILITATION
Payer: COMMERCIAL

## 2021-06-21 PROCEDURE — 97112 NEUROMUSCULAR REEDUCATION: CPT

## 2021-06-21 PROCEDURE — 97110 THERAPEUTIC EXERCISES: CPT

## 2021-06-21 NOTE — PROGRESS NOTES
PT DAILY TREATMENT NOTE     Patient Name: Louisa Arndt  Date:2021  : 1980  [x]  Patient  Verified  Payor: Go Finch / Plan: Anastasia Leyden / Product Type: HMO /    In time:4:55  Out time:5:56  Total Treatment Time (min): 61  Visit #: 5 of 8    Medicare/BCBS Only   Total Timed Codes (min):  51 1:1 Treatment Time:  51       Treatment Area: Low back pain [M54.5]    SUBJECTIVE  Pain Level (0-10 scale): 6  Any medication changes, allergies to medications, adverse drug reactions, diagnosis change, or new procedure performed?: [x] No    [] Yes (see summary sheet for update)  Subjective functional status/changes:   [] No changes reported  Patient reports he has been walking in the bathroom with no cane to try and become more independent without the assistive device. OBJECTIVE            Modality rationale: decrease pain and increase muscle contraction/control to improve the patients ability to improve ease with sleep   Min Type Additional Details    10 []? Ice     [x]? heat  []?   Ice massage Position: Supine  Location: Lumbar, Post-tx       24 min Therapeutic Exercise:  [x]???????? See flow sheet: Emphasis placed on improving available lumbar and LE AROM and strength   Rationale: increase ROM and increase strength to improve the patients ability to improve ease with ADL performance     27 min Neuromuscular Re-education:  [x]????????  See flow sheet : Emphasis placed on improving activation and recruitment of the anterior abdominal and gluteal musculature and improving spinal proprioceptive and kinesthetic awareness   Rationale: increase ROM, increase strength, improve balance and increase proprioception  to improve the patients ability to improve ease with functional transfers.                                                   With   [] TE   [] TA   [] neuro   [] other: Patient Education: [x] Review HEP    [] Progressed/Changed HEP based on:   [] positioning   [] body mechanics   [] transfers   [] heat/ice application    [] other:      Other Objective/Functional Measures:  Rhomberg (EC) = 30  Sec ( minimal to no sway)     Pain Level (0-10 scale) post treatment: 6-7    ASSESSMENT/Changes in Function: Patient is progressing well with performing dynamic balance exercise with minimal use of SPC. Patient will continue to benefit from skilled PT services to modify and progress therapeutic interventions, address functional mobility deficits, address ROM deficits, address strength deficits and analyze and address soft tissue restrictions to attain remaining goals. []  See Plan of Care  []  See progress note/recertification  []  See Discharge Summary         Progress towards goals / Updated goals:  Short Term Goals: To be accomplished in 3 weeks:  1. Patient will subjectively report full compliance with prescribed HEP. At Last PN: Progressing, Patient intiated program, however not fully compliant at this time, 6/2/2021  Current: Progressing, HEP performance reported 1-2x/day, 6/16/2021  2. Patient will demonstrate supine left/right hip flexion MMT >/= 4+/5 to improve ease with bed mobility and dressing. At Last PN: Remains, Supine Left Hip Flexion MMT = 2+/5, Supine Right Hip Flexion MMT = 2+/5, 5/18/2021   Current: Met, Supine Left Hip Flexion MMT = 5/5, Supine Right Hip Flexion MMT = 5/5, 6/17/2021  3. Patient will demonstrate ability to perform sit-to-stand from chair without UE assistance to improve ease with functional transfers. At Last PN: Progressing, Sit-to-Stand (chair) = Ability to perform with CGA without UE assist with multiple attempts required to perform (poor motor planning), 6/2/2021  Current: Progressing, Sit-to-Stand (chair) = Ability to perform independently without UE assist with good motor planning, 6/16/2021     Long Term Goals: To be accomplished in 6 weeks:  1.  Patient will demonstrate a significant functional improvement as demonstrated by a score of >/= 42 on FOTO.  At Last PN: Progressing, FOTO = 36, 6/3/2021  2. Patient will demonstrate Rhomberg (EC) >/= 30 seconds to demonstrate a reduced falls risk. At Last PN: Progressing, Rhomberg (EC) = 30 sec (moderate sway), 6/2/2021  Current: MET: Rhomberg (EC) = 30  Sec ( minimal to no sway). 6/21/21  3. Patient will demonstrate TUG (10 feet, no AD) </= 15 seconds to demonstrate a reduced falls risk.   At Last PN: Progressing, TUG (10 feet, with SPC)= 12 sec, 6/2/21       PLAN  []  Upgrade activities as tolerated     [x]  Continue plan of care  []  Update interventions per flow sheet       []  Discharge due to:_  []  Other:_      Hans Stewart, EVERT 6/21/2021  4:57 PM    Future Appointments   Date Time Provider Qian Garcia   6/21/2021  5:00 PM Kiki Caballero [place] [facility]   6/23/2021  5:00 PM Winifred Ferreira DPT [facility]   7/12/2021  1:20 PM MD MAIRA Horton BS AMB   7/14/2021  4:45 PM MD MELANY DixonFP BS AMB

## 2021-06-22 ENCOUNTER — DOCUMENTATION ONLY (OUTPATIENT)
Dept: ORTHOPEDIC SURGERY | Age: 41
End: 2021-06-22

## 2021-06-23 ENCOUNTER — HOSPITAL ENCOUNTER (OUTPATIENT)
Dept: PHYSICAL THERAPY | Age: 41
Discharge: HOME | End: 2021-06-23
Attending: PHYSICAL MEDICINE & REHABILITATION
Payer: COMMERCIAL

## 2021-06-23 PROCEDURE — 97110 THERAPEUTIC EXERCISES: CPT

## 2021-06-23 PROCEDURE — 97112 NEUROMUSCULAR REEDUCATION: CPT

## 2021-06-23 NOTE — PROGRESS NOTES
PT DAILY TREATMENT NOTE     Patient Name: Fernie Zavala  Date:2021  : 1980  [x]  Patient  Verified  Payor: Antonio Sears / Plan: Kenny Sauer / Product Type: HMO /    In time:4:58P  Out time:5:50P  Total Treatment Time (min): 52  Visit #: 6 of 8    Medicare/BCBS Only   Total Timed Codes (min):  42 1:1 Treatment Time:  40       Treatment Area: Low back pain [M54.5]    SUBJECTIVE  Pain Level (0-10 scale): 7  Any medication changes, allergies to medications, adverse drug reactions, diagnosis change, or new procedure performed?: [x] No    [] Yes (see summary sheet for update)  Subjective functional status/changes:   [] No changes reported  Patient reports he continues to use SPC at home and community secondary to fear of falling     OBJECTIVE            Modality rationale: decrease pain and increase muscle contraction/control to improve the patients ability to improve ease with sleep   Min Type Additional Details    10 [x]? Ice     []?  heat  []?   Ice massage Position: Semi Reclined  Location: Lumbar, Post-tx       22 min Therapeutic Exercise:  [x]???????? See flow sheet: Emphasis placed on improving available lumbar and LE AROM and strength   Rationale: increase ROM and increase strength to improve the patients ability to improve ease with ADL performance     20 min Neuromuscular Re-education:  [x]????????  See flow sheet : Emphasis placed on improving activation and recruitment of the anterior abdominal and gluteal musculature and improving spinal proprioceptive and kinesthetic awareness   Rationale: increase ROM, increase strength, improve balance and increase proprioception  to improve the patients ability to improve ease with functional transfers.                                               With   [x] TE   [] TA   [x] neuro   [] other: Patient Education: [x] Review HEP    [] Progressed/Changed HEP based on:   [] positioning   [] body mechanics   [] transfers   [] heat/ice application [] other:        Pain Level (0-10 scale) post treatment: 5-6    ASSESSMENT/Changes in Function:  Pt demonstrates fair muscular endurance w/ good dynamic balance during performance of today's session. Will continue to progress activities as tolerated and appropriate in order for carryover effects of improved confidence w/ ambulation tasks. Patient will continue to benefit from skilled PT services to modify and progress therapeutic interventions, address functional mobility deficits, address ROM deficits, address strength deficits and analyze and address soft tissue restrictions to attain remaining goals. [x]  See Plan of Care  []  See progress note/recertification  []  See Discharge Summary         Progress towards goals / Updated goals:  Short Term Goals: To be accomplished in 3 weeks:  1. Patient will subjectively report full compliance with prescribed HEP. At Last PN: Progressing, Patient intiated program, however not fully compliant at this time, 6/2/2021  Current: Progressing, HEP performance reported 1-2x/day, 6/16/2021  2. Patient will demonstrate supine left/right hip flexion MMT >/= 4+/5 to improve ease with bed mobility and dressing. At Last PN: Remains, Supine Left Hip Flexion MMT = 2+/5, Supine Right Hip Flexion MMT = 2+/5, 5/18/2021   Current: Met, Supine Left Hip Flexion MMT = 5/5, Supine Right Hip Flexion MMT = 5/5, 6/17/2021  3. Patient will demonstrate ability to perform sit-to-stand from chair without UE assistance to improve ease with functional transfers. At Last PN: Progressing, Sit-to-Stand (chair) = Ability to perform with CGA without UE assist with multiple attempts required to perform (poor motor planning), 6/2/2021  Current: Progressing, Sit-to-Stand (chair) = Ability to perform independently without UE assist with good motor planning, 6/16/2021     Long Term Goals: To be accomplished in 6 weeks:  1.  Patient will demonstrate a significant functional improvement as demonstrated by a score of >/= 42 on FOTO. At Last PN: Progressing, FOTO = 36, 6/3/2021  2. Patient will demonstrate Rhomberg (EC) >/= 30 seconds to demonstrate a reduced falls risk. At Last PN: Progressing, Rhomberg (EC) = 30 sec (moderate sway), 6/2/2021  Current: MET: Rhomberg (EC) = 30  Sec ( minimal to no sway). 6/21/21  3. Patient will demonstrate TUG (10 feet, no AD) </= 15 seconds to demonstrate a reduced falls risk.   At Last PN: Progressing, TUG (10 feet, with SPC)= 12 sec, 6/2/21       PLAN  []  Upgrade activities as tolerated     [x]  Continue plan of care  []  Update interventions per flow sheet       []  Discharge due to:_  []  Other:_      Emma Mcneal DPT 6/23/2021  4:57 PM    Future Appointments   Date Time Provider Qian Garcia   7/12/2021  1:20 PM Donald Durham MD [facility]   7/14/2021  4:45 PM Gio Hernández MD [facility]

## 2021-06-24 DIAGNOSIS — M51.36 DDD (DEGENERATIVE DISC DISEASE), LUMBAR: ICD-10-CM

## 2021-06-24 DIAGNOSIS — M54.16 LUMBAR NEURITIS: ICD-10-CM

## 2021-06-24 DIAGNOSIS — M43.07 SPONDYLOLYSIS, LUMBOSACRAL: ICD-10-CM

## 2021-06-24 DIAGNOSIS — Q76.2 SPONDYLOLISTHESIS, CONGENITAL: ICD-10-CM

## 2021-06-25 ENCOUNTER — DOCUMENTATION ONLY (OUTPATIENT)
Dept: ORTHOPEDIC SURGERY | Age: 41
End: 2021-06-25

## 2021-06-28 ENCOUNTER — TELEPHONE (OUTPATIENT)
Dept: FAMILY MEDICINE CLINIC | Age: 41
End: 2021-06-28

## 2021-06-28 NOTE — TELEPHONE ENCOUNTER
Pt called stating that he dropped his medical record off at Dr Benjie Smith office by mistake but they are going to send them over her to Dr Galo Sauceda and wanted to let Antonio Barney know so that she could watch for them and let him know when she received them . Thank you.

## 2021-07-07 ENCOUNTER — TELEPHONE (OUTPATIENT)
Dept: ORTHOPEDIC SURGERY | Age: 41
End: 2021-07-07

## 2021-07-07 NOTE — LETTER
NOTIFICATION RETURN TO WORK / SCHOOL    7/7/2021 4:26 PM    Mr. [phone]      To Whom It May Concern:    Epi Akins is currently under the care of [address] He will be on a no duty status until after his follow up appointment on 07/21/2021. If there are questions or concerns please have the patient contact our office.         Sincerely,      Jonny Corral MD

## 2021-07-07 NOTE — TELEPHONE ENCOUNTER
Patient called and is requesting an out of work note from Ahmet Adame. Patient was called from the [address] to reschedule the appt he had with [address] on 7/12/2021 due to Ahmet Crook. will be out of the office. Patient reschedule the appt for the next available date 07/21/2021. Patient said he will need an out of work note , to be out of work until his appt with Ahmet Adame on 07/21/2021. Will  from the [facility]. Patient tel. [phone] or [phone].

## 2021-07-08 NOTE — TELEPHONE ENCOUNTER
Patient was actually referring to forms not medical records. The forms have been received and have been placed on Dr. Cas Yeh desangella for review on Monday (7/12/2021).

## 2021-07-12 ENCOUNTER — PATIENT MESSAGE (OUTPATIENT)
Dept: FAMILY MEDICINE CLINIC | Age: 41
End: 2021-07-12

## 2021-07-12 NOTE — TELEPHONE ENCOUNTER
Sigrid message sent to patient advising that since the disability claim is for PTSD and ADHD that the neuropsych physician should complete.

## 2021-07-14 ENCOUNTER — TELEPHONE (OUTPATIENT)
Dept: NEUROLOGY | Age: 41
End: 2021-07-14

## 2021-07-14 ENCOUNTER — VIRTUAL VISIT (OUTPATIENT)
Dept: FAMILY MEDICINE CLINIC | Age: 41
End: 2021-07-14
Payer: COMMERCIAL

## 2021-07-14 DIAGNOSIS — F39 MOOD DISORDER (HCC): Primary | ICD-10-CM

## 2021-07-14 DIAGNOSIS — M47.816 OSTEOARTHRITIS OF LUMBAR SPINE, UNSPECIFIED SPINAL OSTEOARTHRITIS COMPLICATION STATUS: ICD-10-CM

## 2021-07-14 PROCEDURE — 99212 OFFICE O/P EST SF 10 MIN: CPT | Performed by: FAMILY MEDICINE

## 2021-07-14 RX ORDER — DULOXETIN HYDROCHLORIDE 30 MG/1
CAPSULE, DELAYED RELEASE ORAL
COMMUNITY
Start: 2021-07-02 | End: 2021-10-18

## 2021-07-14 RX ORDER — ARIPIPRAZOLE 10 MG/1
15 TABLET ORAL
COMMUNITY
Start: 2021-07-02 | End: 2022-10-20

## 2021-07-14 NOTE — TELEPHONE ENCOUNTER
Spoke with patient,verified name and , patient informed upon Dx FMLA paperwork cannot be filled out or speak on his physical limitations. Patient request to come to office and sign TAPAN to obtain office notes. Patient reports [facility] already has copy.

## 2021-07-14 NOTE — PROGRESS NOTES
Saulo Villarreal, who was evaluated through a synchronous (real-time) audio-video encounter, and/or his healthcare decision maker, is aware that it is a billable service, with coverage as determined by his insurance carrier. He provided verbal consent to proceed: Yes, and patient identification was verified. It was conducted pursuant to the emergency declaration under the [facility], [address] [place] [address] and the [email or web address] and [employer] General Act. A caregiver was present when appropriate. Ability to conduct physical exam was limited. I was in the office. The patient was at home. SUBJECTIVE  Chief Complaint   Patient presents with    Follow-up     review specialty follow-up for anxiety and back pain     Patient presents for follow-up. Seeing spine specialist for back. No new complaints. He has sent disability paperwork for them to fill out. Has is seeing a counselor (Lior Tomlinson) and a psychiatrist (Dr. Nely Bateman). He is taking meds as prescribed. He is doing somewhat better but has bad days. He has sent disability paperwork for his psychiatrist to fill out after some initial confusion about him asking neuropsych / neuro to fill it out and then them sending it to us. OBJECTIVE    General:  alert, cooperative, well appearing, in no apparent distress. ASSESSMENT / PLAN    ICD-10-CM ICD-9-CM    1. Mood disorder (HCC)  F39 296.90    2. Osteoarthritis of lumbar spine, unspecified spinal osteoarthritis complication status  G24.006 721.3      Mood disorder - cont per psychiatry. Lumbar OA - cont per spine center for management. All chart history elements were reviewed by me at the time of the visit even though marked at time of note closure. Patient understands our medical plan. Patient has provided input and agrees with goals. Alternatives have been explained and offered. All questions answered.   The patient is to call if condition worsens or fails to improve. RTC as needed for care management at this point, annually for CPE.
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
## 2021-07-21 ENCOUNTER — OFFICE VISIT (OUTPATIENT)
Dept: ORTHOPEDIC SURGERY | Age: 41
End: 2021-07-21
Payer: COMMERCIAL

## 2021-07-21 VITALS
TEMPERATURE: 97.8 F | BODY MASS INDEX: 32.34 KG/M2 | WEIGHT: 219 LBS | DIASTOLIC BLOOD PRESSURE: 80 MMHG | HEART RATE: 82 BPM | OXYGEN SATURATION: 97 % | SYSTOLIC BLOOD PRESSURE: 125 MMHG | RESPIRATION RATE: 18 BRPM

## 2021-07-21 DIAGNOSIS — M51.36 DDD (DEGENERATIVE DISC DISEASE), LUMBAR: ICD-10-CM

## 2021-07-21 DIAGNOSIS — Q76.2 SPONDYLOLISTHESIS, CONGENITAL: Primary | ICD-10-CM

## 2021-07-21 DIAGNOSIS — M54.16 LUMBAR NEURITIS: ICD-10-CM

## 2021-07-21 DIAGNOSIS — M43.07 SPONDYLOLYSIS, LUMBOSACRAL: ICD-10-CM

## 2021-07-21 PROCEDURE — 99214 OFFICE O/P EST MOD 30 MIN: CPT | Performed by: PHYSICAL MEDICINE & REHABILITATION

## 2021-07-21 RX ORDER — PREGABALIN 225 MG/1
225 CAPSULE ORAL 2 TIMES DAILY
Qty: 60 CAPSULE | Refills: 1 | Status: SHIPPED | OUTPATIENT
Start: 2021-07-21 | End: 2021-10-18

## 2021-07-21 NOTE — LETTER
7/21/2021    Patient: Lorna Batters   YOB: 1980   Date of Visit: 7/21/2021     Neris Barajas MD  [place] [id number] [place] [address]  [place]    Dear Neris Barajas MD,      Thank you for referring Mr. Umair Ricketts to [address] for evaluation. My notes for this consultation are attached. If you have questions, please do not hesitate to call me. I look forward to following your patient along with you.       Sincerely,    Diogenes Wyatt MD

## 2021-07-26 ENCOUNTER — VIRTUAL VISIT (OUTPATIENT)
Dept: ORTHOPEDIC SURGERY | Age: 41
End: 2021-07-26
Payer: COMMERCIAL

## 2021-07-26 DIAGNOSIS — M43.07 SPONDYLOLYSIS, LUMBOSACRAL: ICD-10-CM

## 2021-07-26 DIAGNOSIS — M51.36 DDD (DEGENERATIVE DISC DISEASE), LUMBAR: ICD-10-CM

## 2021-07-26 DIAGNOSIS — Q76.2 SPONDYLOLISTHESIS, CONGENITAL: Primary | ICD-10-CM

## 2021-07-26 PROCEDURE — 99214 OFFICE O/P EST MOD 30 MIN: CPT | Performed by: NURSE PRACTITIONER
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
## 2021-08-11 ENCOUNTER — TELEPHONE (OUTPATIENT)
Dept: FAMILY MEDICINE CLINIC | Age: 41
End: 2021-08-11

## 2021-08-11 ENCOUNTER — DOCUMENTATION ONLY (OUTPATIENT)
Dept: FAMILY MEDICINE CLINIC | Age: 41
End: 2021-08-11

## 2021-08-11 DIAGNOSIS — M51.36 DDD (DEGENERATIVE DISC DISEASE), LUMBAR: Primary | ICD-10-CM

## 2021-08-11 RX ORDER — PREGABALIN 150 MG/1
CAPSULE ORAL
Qty: 30 CAPSULE | Refills: 0 | Status: SHIPPED | OUTPATIENT
Start: 2021-08-11 | End: 2021-08-16

## 2021-08-11 NOTE — TELEPHONE ENCOUNTER
Patient's spouse called in to state he is not doing well on current medication pregabalin (LYRICA) 225 mg capsule     Spouse requesting call back because she did not give him any medication this morning and she is worried because he has a fever.     Phn #: [phone]

## 2021-08-11 NOTE — TELEPHONE ENCOUNTER
Please call. Lyrica should not cause a Fever. I would recommend FU with PCP if he continues to not feel well.

## 2021-08-11 NOTE — TELEPHONE ENCOUNTER
Ok, we can taper off the [place]. I would not stop it abruptly. Does she want me to send in a reduced dose for tapering  ?

## 2021-08-11 NOTE — TELEPHONE ENCOUNTER
Pt's wife states that pt had suicidal thoughts last week 8/5/2021and reached out to the on call psychiatrist and went to the [facility] and was checked and he was released . Then pt had a fever on Saturday 8/7/2021 running  103. 6. was able to bring the fever down with Tylenol. Pt's wife thinks that maybe his medication is causing this medical crisis. She would like to know if he needs to be seen.

## 2021-08-11 NOTE — TELEPHONE ENCOUNTER
Called patients wife Courtney Miller) [phone] and left voice mail asking her to call the office back.

## 2021-08-11 NOTE — TELEPHONE ENCOUNTER
Patients wife states that he is on Abilify and cymbalta and she read that lyrica mixed with psych meds will cause a fever. She did not give him the lyrica this morning.

## 2021-08-11 NOTE — TELEPHONE ENCOUNTER
LMOV that they need to call back with more details. I may be able to add appt tomorrow or at least give advice on what to do.

## 2021-08-12 ENCOUNTER — VIRTUAL VISIT (OUTPATIENT)
Dept: FAMILY MEDICINE CLINIC | Age: 41
End: 2021-08-12
Payer: COMMERCIAL

## 2021-08-12 DIAGNOSIS — R45.851 SUICIDAL IDEATION: ICD-10-CM

## 2021-08-12 DIAGNOSIS — R50.9 FEVER, UNSPECIFIED FEVER CAUSE: Primary | ICD-10-CM

## 2021-08-12 DIAGNOSIS — F33.0 DEPRESSION, MAJOR, RECURRENT, MILD (HCC): ICD-10-CM

## 2021-08-12 PROCEDURE — 99212 OFFICE O/P EST SF 10 MIN: CPT | Performed by: FAMILY MEDICINE

## 2021-08-12 NOTE — PROGRESS NOTES
Vicky Simon, was evaluated through a synchronous (real-time) audio-video encounter. The patient (or guardian if applicable) is aware that this is a billable service. Verbal consent to proceed has been obtained within the past 12 months. The visit was conducted pursuant to the emergency declaration under the [address] [facility], [address] [place] [address] and the [employer] Act. Patient identification was verified, and a caregiver was present when appropriate. The patient was located in a state where the provider was credentialed to provide care. SUBJECTIVE  Chief Complaint   Patient presents with    Fever    ED Follow-up     [facility] ED 8/5/2021 for hallucinations     Patient presents for ER follow up. He was seen for suicidal ideations and hallucinations. He was referred to the ER by his psychiatrist.  He was evaluated and released. He is doing better from that end. However he spiked a fever 2-3 days later up to 103. It gradually came down over 2-3 days. It has since resolved. ROS did not yield a source of fever. He had a covid-19 test in the ER. OBJECTIVE    General:  Alert, cooperative, well appearing, in no apparent distress. ASSESSMENT / PLAN    ICD-10-CM ICD-9-CM    1. Fever, unspecified fever cause  R50.9 780.60    2. Depression, major, recurrent, mild (HCC)  F33.0 296.31    3. Suicidal ideation  R45. 851 V62.84      Fever - resolved. If returns, start work-up. Depression / suicidal ideation - under care of psychiatry. He is currently not having any ideation. All chart history elements were reviewed by me at the time of the visit even though marked at time of note closure. Patient understands our medical plan. Patient has provided input and agrees with goals. Alternatives have been explained and offered. All questions answered.   The patient is to call if condition worsens or fails to improve. RTC as needed.

## 2021-08-12 NOTE — PROGRESS NOTES
1. Have you been to the ER, urgent care clinic since your last visit? Hospitalized since your last visit? Yes Where: [facility] on 8/5/2021    2. Have you seen or consulted any other health care providers outside of the [address] [place] [address] since your last visit? Include any pap smears or colon screening.  No    8/27/2021 appointment with In [address] [place] [address]    3 most recent [place] [address] Screens 8/12/2021   Little interest or pleasure in doing things Nearly every day   Feeling down, depressed, irritable, or hopeless Nearly every day   Total Score PHQ 2 6   Trouble falling or staying asleep, or sleeping too much Nearly every day   Feeling tired or having little energy Nearly every day   Poor appetite, weight loss, or overeating Several days   Feeling bad about yourself - or that you are a failure or have let yourself or your family down Nearly every day   Trouble concentrating on things such as school, work, reading, or watching TV Nearly every day   Moving or speaking so slowly that other people could have noticed; or the opposite being so fidgety that others notice Not at all   Thoughts of being better off dead, or hurting yourself in some way Several days   PHQ 9 Score 20   How difficult have these problems made it for you to do your work, take care of your home and get along with others -
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
## 2021-08-16 ENCOUNTER — VIRTUAL VISIT (OUTPATIENT)
Dept: ORTHOPEDIC SURGERY | Age: 41
End: 2021-08-16
Payer: COMMERCIAL

## 2021-08-16 DIAGNOSIS — M43.07 SPONDYLOLYSIS, LUMBOSACRAL: ICD-10-CM

## 2021-08-16 DIAGNOSIS — M51.36 DDD (DEGENERATIVE DISC DISEASE), LUMBAR: ICD-10-CM

## 2021-08-16 DIAGNOSIS — Q76.2 SPONDYLOLISTHESIS, CONGENITAL: Primary | ICD-10-CM

## 2021-08-16 DIAGNOSIS — M54.16 LUMBAR NEURITIS: ICD-10-CM

## 2021-08-16 PROCEDURE — 99443 PR PHYS/QHP TELEPHONE EVALUATION 21-30 MIN: CPT | Performed by: NURSE PRACTITIONER

## 2021-08-16 NOTE — PATIENT INSTRUCTIONS
Electromiograma (EMG) y estudios de conducción nerviosa: Acerca de estas pruebas  Electromyogram (EMG) and Nerve Conduction Studies: About These Tests  
Qué son Everlene Salvia? Un electromiograma (EMG) mide la actividad eléctrica de los músculos cuando no los está usando (en reposo) y cuando los tensa (contracción muscular). Los estudios de [facility] (NCS, por devin siglas en inglés) miden con qué eficacia y rapidez los nervios pueden enviar señales eléctricas. El EMG y los estudios de conducción nerviosa a menudo se hacen juntos. Si se hacen juntos, los estudios de conducción nerviosa se hacen antes del EMG. Por qué se hacen? Es posible que necesite clara EMG para encontrar enfermedades que dañan los músculos o los nervios o para determinar por qué usted no [employer] (parálisis), por qué se sienten débiles o por qué tienen espasmos. Estos problemas pueden incluir clara hernia de disco, esclerosis lateral amiotrófica (CAITLYN) o miastenia grave (MG). Es posible que necesite estudios de conducción nerviosa para determinar el daño a los nervios que Seldon Villa desde el cerebro y la médula kang al carlos del cuerpo. ([place] se llama sistema nervioso periférico). Estos estudios a menudo se utilizan para ayudar a encontrar trastornos nerviosos, tobias el síndrome del túnel carpiano. Cómo se prepara usted para estas pruebas? · Use ropa holgada. Nilesh vez le den clara bata de hospital para que se la ponga. · Los electrodos para la prueba se adhieren a la piel. Debe tener la piel limpia y Rise Blade de productos en aerosol, aceites, cremas y lociones. · Es posible que le pidan que firme un formulario de consentimiento que diga que entiende los riesgos de la prueba y que está de acuerdo en Stevens Garcia. · Informe a long médico sobre [employer], vitaminas, suplementos y renetta herbarios que barrera. Algunos pueden aumentar el riesgo de problemas damari long prueba.  Long médico le dirá si debería dejar de glenys cualquiera de ellos antes de la prueba y cuándo debe hacerlo. · Si barrera aspirina o cualquier otro medicamento que previene los coágulos de Rafa, pregúntele a fernandez médico si debería dejar de tomarlo antes de fernandez prueba. Asegúrese de entender exactamente lo que fernandez médico quiere que dasha. Estos medicamentos aumentan el riesgo de sangrado. Laith Darby damari las pruebas? Se recuesta en clara davis de exploración o en clara cama o se sienta en clara silla reclinable para que devin músculos estén relajados. Para un EMG:   · Fernandez médico le insertará un electrodo de aguja en un músculo. [place] registrará la actividad eléctrica mientras el músculo está en reposo. Haig Gladys sienta un dolor mckayla y breve cuando le introduzcan la aguja en el músculo. · Fernandez médico le pedirá que contraiga fernie músculo lenta y sostenidamente mientras se registra la actividad eléctrica. · Fernandez médico puede  el electrodo a un lugar diferente del músculo o a un músculo diferente. Para los estudios de conducción nerviosa:   · Fernandez médico le colocará dos tipos de electrodos sobre la piel. ? Un tipo de electrodo se coloca sobre un nervio y le dará un impulso eléctrico al nervio. ? El otro tipo de electrodo se coloca sobre el músculo controlado por el nervio. [place] registrará el tiempo que tarda el músculo en reaccionar al impulso eléctrico.  · Podrá sentir los impulsos eléctricos. Son choques pequeños y son seguros. Qué más debería saber usted acerca de estas pruebas? · Después de un EMG, usted podría tener dolor y Tg [place]ier sensación de hormigueo en los músculos damari hasta 2 días. Le pueden salir pequeños moretones o producirse clara hinchazón en el sitio de la punción. · Para un EMG, se le puede pedir que firme un formulario de consentimiento. Hable con fernandez médico de cualquier inquietud que [employer] necesidad de la prueba, devin [place], cómo se hace o el significado de [place]. [facility]?   · Pasqual Crutch puede Nonda Estiven de 30 a 60 minutos. · Los estudios de conducción nerviosa pueden durar de 15 minutos a 1 hora o [employer]. Depende de cuántos nervios y músculos examine el médico.  
Qué ocurre después de estas pruebas? · Si alguna de las zonas de prueba del EMG están adoloridas:  ? Coloque hielo o clara compresa fría sobre la cassie damari 10 a 20 minutos por vez. Coloque un paño galvan entre el hielo y la piel. ? [place] un analgésico (medicamento para el dolor) de venta tiff, tobias acetaminofén (Tylenol), ibuprofeno (Advil, Motrin) o naproxeno (Aleve). [place] d[place] con los medicamentos. Alexa y siga todas las instrucciones de la [place]. · Es probable que pueda volver a fernandez hogar de inmediato. · Puede volver a devin actividades habituales de inmediato. Cuándo debe pedir ayuda? Preste especial atención a los cambios en fernandez alli y asegúrese de comunicarse con fernandez médico si:  · El dolor muscular debido al EMG empeora o tiene hinchazón, sensibilidad o pus en cualquiera de los sitios de la punción. · Tiene usted algún problema que piensa que puede ser debido a la prueba. · Tiene alguna pregunta acerca de la prueba o no ha recibido [employer]. La atención de seguimiento es clara parte clave de fernandez tratamiento y seguridad. Asegúrese de hacer y acudir a todas las citas, y llame a fernandez médico si está teniendo problemas. También es clara buena idea mantener clara lista de los medicamentos que barrera. Pregúntele a fernandez médico cuándo espera [employer] de la prueba. Dónde puede encontrar más información en inglés? Sean Bright a http://www.gray.com/  Lazaro [id number] en la búsqueda para aprender más acerca de \"Electromiograma (EMG) y estudios de conducción nerviosa: Acerca de estas pruebas. \"  Revisado: 4 agosto, 2020               Versión del contenido: 12.8  © 2006-2021 Healthwise, Incorporated.    Las instrucciones de cuidado fueron adaptadas bajo licencia por [employer] (which disclaims liability or warranty for this information). Si usted tiene [place] afección médica o sobre estas instrucciones, siempre pregunte a fernandez profesional de alli. [facility] niega toda garantía o responsabilidad por frenandez uso de esta información.

## 2021-08-23 ENCOUNTER — TELEPHONE (OUTPATIENT)
Dept: ORTHOPEDIC SURGERY | Age: 41
End: 2021-08-23

## 2021-08-23 ENCOUNTER — OFFICE VISIT (OUTPATIENT)
Dept: ORTHOPEDIC SURGERY | Age: 41
End: 2021-08-23
Payer: COMMERCIAL

## 2021-08-23 VITALS
RESPIRATION RATE: 16 BRPM | SYSTOLIC BLOOD PRESSURE: 114 MMHG | DIASTOLIC BLOOD PRESSURE: 70 MMHG | OXYGEN SATURATION: 97 % | HEIGHT: 69 IN | BODY MASS INDEX: 32.23 KG/M2 | TEMPERATURE: 97.3 F | WEIGHT: 217.6 LBS | HEART RATE: 81 BPM

## 2021-08-23 DIAGNOSIS — M51.36 DDD (DEGENERATIVE DISC DISEASE), LUMBAR: ICD-10-CM

## 2021-08-23 DIAGNOSIS — M47.16 OSTEOARTHRITIS OF SPINE WITH MYELOPATHY, LUMBOSACRAL REGION: ICD-10-CM

## 2021-08-23 DIAGNOSIS — M47.817 LUMBOSACRAL SPONDYLOSIS WITHOUT MYELOPATHY: ICD-10-CM

## 2021-08-23 DIAGNOSIS — R20.2 NUMBNESS AND TINGLING OF BOTH LOWER EXTREMITIES: ICD-10-CM

## 2021-08-23 DIAGNOSIS — M48.061 FORAMINAL STENOSIS OF LUMBAR REGION: ICD-10-CM

## 2021-08-23 DIAGNOSIS — M43.07 SPONDYLOLYSIS, LUMBOSACRAL: ICD-10-CM

## 2021-08-23 DIAGNOSIS — R20.0 NUMBNESS AND TINGLING OF BOTH LOWER EXTREMITIES: ICD-10-CM

## 2021-08-23 DIAGNOSIS — M54.16 LUMBAR NEURITIS: Primary | ICD-10-CM

## 2021-08-23 PROCEDURE — 99213 OFFICE O/P EST LOW 20 MIN: CPT | Performed by: PHYSICAL MEDICINE & REHABILITATION

## 2021-08-23 RX ORDER — PREGABALIN 150 MG/1
150 CAPSULE ORAL 2 TIMES DAILY
Qty: 120 CAPSULE | Refills: 0 | Status: SHIPPED | OUTPATIENT
Start: 2021-08-23 | End: 2021-11-15 | Stop reason: SDUPTHER

## 2021-08-23 NOTE — TELEPHONE ENCOUNTER
Form given to Jorden Dougherty to take to [facility] tomorrow for Dr. Claudeen Grange to sign.

## 2021-08-23 NOTE — LETTER
8/23/2021    Patient: Brandy Maurer   YOB: 1980   Date of Visit: 8/23/2021     Jas Herrera MD  [address]  [facility] [facility] [address]    Dear Jas Herrera MD,      Thank you for referring Mr. Brea Portillo to [address] for evaluation. My notes for this consultation are attached. If you have questions, please do not hesitate to call me. I look forward to following your patient along with you.       Sincerely,    Bjorn Mcknight MD

## 2021-08-27 ENCOUNTER — TELEPHONE (OUTPATIENT)
Dept: ORTHOPEDIC SURGERY | Age: 41
End: 2021-08-27

## 2021-08-27 NOTE — TELEPHONE ENCOUNTER
Yina from patient's [facility] pharmacy called in stating she was having trouble refilling the patient's medication pregabalin (LYRICA) 225 mg capsule    The pharmacy is requesting a call back at [phone]

## 2021-08-27 NOTE — TELEPHONE ENCOUNTER
[address] [place] [address] [phone] and spoke to pharmacist. She inquired about the tapering instructions for the Lyrica. She stated it did not have a duration date. I informed her two weeks and then the patient will be going to the Lyrica 150 mg twice a day. She verbalized understanding and no further action is needed at this time.

## 2021-10-18 ENCOUNTER — VIRTUAL VISIT (OUTPATIENT)
Dept: ORTHOPEDIC SURGERY | Age: 41
End: 2021-10-18
Payer: COMMERCIAL

## 2021-10-18 DIAGNOSIS — M54.16 LUMBAR NEURITIS: ICD-10-CM

## 2021-10-18 DIAGNOSIS — M43.07 SPONDYLOLYSIS, LUMBOSACRAL: ICD-10-CM

## 2021-10-18 DIAGNOSIS — Q76.2 SPONDYLOLISTHESIS, CONGENITAL: ICD-10-CM

## 2021-10-18 DIAGNOSIS — M51.36 DDD (DEGENERATIVE DISC DISEASE), LUMBAR: Primary | ICD-10-CM

## 2021-10-18 DIAGNOSIS — M48.061 FORAMINAL STENOSIS OF LUMBAR REGION: ICD-10-CM

## 2021-10-18 PROCEDURE — 99443 PR PHYS/QHP TELEPHONE EVALUATION 21-30 MIN: CPT | Performed by: NURSE PRACTITIONER
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
## 2021-10-19 ENCOUNTER — TELEPHONE (OUTPATIENT)
Dept: ORTHOPEDIC SURGERY | Age: 41
End: 2021-10-19

## 2021-10-19 NOTE — TELEPHONE ENCOUNTER
Patient says Anatoly Wright never received our pain management referral, please re-send for patient to schedule.

## 2021-10-21 ENCOUNTER — TELEPHONE (OUTPATIENT)
Dept: ORTHOPEDIC SURGERY | Age: 41
End: 2021-10-21

## 2021-10-21 NOTE — TELEPHONE ENCOUNTER
Called patient [phone] and verified his name and date of birth. I inquired if he was still out of work and patient stated yes. I informed him that the provider has approved his out work note until his follow up appointment on 11/23/2021. I inquired if he have an appointment with [facility] yet and he stated. I called Dr. Long Altman and discuss this message and he gave verbal approval to give the continued work note until his follow up appointment on 11/23/2021.

## 2021-10-21 NOTE — TELEPHONE ENCOUNTER
Patient called in to request work note. States he needs something to indicate he is out of work for his employer.      Requesting call back @: [phone]

## 2021-10-21 NOTE — LETTER
NOTIFICATION RETURN TO WORK / SCHOOL    10/21/2021 3:31 PM    Mr. Curry [phone]      To Whom It May Concern:    Lilliana Rincon is currently under the care of [address] [place] [address] Jon Barrios. He will remain out of work until his follow up appointment on 11/23/2021 where he will be re-evaluated. If there are questions or concerns please have the patient contact our office.         Sincerely,      Pineda Richardson MD

## 2021-10-25 ENCOUNTER — PATIENT MESSAGE (OUTPATIENT)
Dept: ORTHOPEDIC SURGERY | Age: 41
End: 2021-10-25

## 2021-10-25 ENCOUNTER — TELEPHONE (OUTPATIENT)
Dept: ORTHOPEDIC SURGERY | Age: 41
End: 2021-10-25

## 2021-10-25 NOTE — TELEPHONE ENCOUNTER
Patient called in to request a letter to restore his driving privileges.  Requesting call back    n #: [phone]

## 2021-10-25 NOTE — TELEPHONE ENCOUNTER
Patient has left 2 messages within the same time frame for this. I have sent the first one to Dr Bambi Deng and am waiting for a response.

## 2021-10-25 NOTE — TELEPHONE ENCOUNTER
From: Enrique Ellison  To: Summer Prakash MD  Sent: 10/25/2021 11:02 AM EDT  Subject: Non-Urgent Medical Question    I would like to know if I can get my driving approved its been 6 months

## 2021-10-26 NOTE — TELEPHONE ENCOUNTER
Patient contacted and told that Dr Cooepr Miranda did not take away his driving privileges.  He expressed understanding

## 2021-11-03 ENCOUNTER — TELEPHONE (OUTPATIENT)
Dept: ORTHOPEDIC SURGERY | Age: 41
End: 2021-11-03

## 2021-11-03 NOTE — TELEPHONE ENCOUNTER
Patient is requesting a return to work note for 11/08/21. Please advise when available.      Patient [phone]

## 2021-11-03 NOTE — LETTER
NOTIFICATION RETURN TO WORK / SCHOOL    11/3/2021 1:58 PM    Mr. Lino Redd  [address] [address]      To Whom It May Concern:    Lino Redd is currently under the care of [facility]. He will return to work on 11/08/21. If there are questions or concerns please have the patient contact our office.         Sincerely,      Lotus Duran MD

## 2021-11-15 DIAGNOSIS — M51.36 DDD (DEGENERATIVE DISC DISEASE), LUMBAR: ICD-10-CM

## 2021-11-15 DIAGNOSIS — R20.2 NUMBNESS AND TINGLING OF BOTH LOWER EXTREMITIES: ICD-10-CM

## 2021-11-15 DIAGNOSIS — R20.0 NUMBNESS AND TINGLING OF BOTH LOWER EXTREMITIES: ICD-10-CM

## 2021-11-15 DIAGNOSIS — M54.16 LUMBAR NEURITIS: ICD-10-CM

## 2021-11-15 DIAGNOSIS — M47.16 OSTEOARTHRITIS OF SPINE WITH MYELOPATHY, LUMBOSACRAL REGION: ICD-10-CM

## 2021-11-15 DIAGNOSIS — M48.061 FORAMINAL STENOSIS OF LUMBAR REGION: ICD-10-CM

## 2021-11-15 DIAGNOSIS — M43.07 SPONDYLOLYSIS, LUMBOSACRAL: ICD-10-CM

## 2021-11-15 DIAGNOSIS — M47.817 LUMBOSACRAL SPONDYLOSIS WITHOUT MYELOPATHY: ICD-10-CM

## 2021-11-15 NOTE — TELEPHONE ENCOUNTER
Last Visit: 10/18/21 with BRAYAN Casillas  Next Appointment: 11/23/21 with MD Jarek Neville  Previous Refill Encounter(s): 8/23/21 #120    Requested Prescriptions     Pending Prescriptions Disp Refills    pregabalin (Lyrica) 150 mg capsule 120 Capsule 0     Sig: Take 1 Capsule by mouth two (2) times a day. Max Daily Amount: 300 mg.

## 2021-11-16 RX ORDER — PREGABALIN 150 MG/1
150 CAPSULE ORAL 2 TIMES DAILY
Qty: 60 CAPSULE | Refills: 2 | Status: SHIPPED | OUTPATIENT
Start: 2021-11-16 | End: 2022-10-20
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
## 2021-11-23 ENCOUNTER — OFFICE VISIT (OUTPATIENT)
Dept: ORTHOPEDIC SURGERY | Age: 41
End: 2021-11-23
Payer: COMMERCIAL

## 2021-11-23 VITALS
BODY MASS INDEX: 32.93 KG/M2 | WEIGHT: 223 LBS | RESPIRATION RATE: 16 BRPM | TEMPERATURE: 98.1 F | OXYGEN SATURATION: 97 % | HEART RATE: 91 BPM

## 2021-11-23 DIAGNOSIS — M47.16 OSTEOARTHRITIS OF SPINE WITH MYELOPATHY, LUMBOSACRAL REGION: ICD-10-CM

## 2021-11-23 DIAGNOSIS — M43.07 SPONDYLOLYSIS, LUMBOSACRAL: ICD-10-CM

## 2021-11-23 DIAGNOSIS — M51.36 DDD (DEGENERATIVE DISC DISEASE), LUMBAR: ICD-10-CM

## 2021-11-23 DIAGNOSIS — M47.817 LUMBOSACRAL SPONDYLOSIS WITHOUT MYELOPATHY: ICD-10-CM

## 2021-11-23 DIAGNOSIS — M54.16 LUMBAR NEURITIS: Primary | ICD-10-CM

## 2021-11-23 DIAGNOSIS — R20.2 NUMBNESS AND TINGLING OF BOTH LOWER EXTREMITIES: ICD-10-CM

## 2021-11-23 DIAGNOSIS — M48.061 FORAMINAL STENOSIS OF LUMBAR REGION: ICD-10-CM

## 2021-11-23 DIAGNOSIS — R20.0 NUMBNESS AND TINGLING OF BOTH LOWER EXTREMITIES: ICD-10-CM

## 2021-11-23 PROCEDURE — 99213 OFFICE O/P EST LOW 20 MIN: CPT | Performed by: PHYSICAL MEDICINE & REHABILITATION

## 2021-11-23 NOTE — LETTER
11/23/2021    Patient: Yogesh Somers   YOB: 1980   Date of Visit: 11/23/2021     Alesia Aparicio MD  [facility] [address] [place] [address]  Via In [facility] [address]    Dear Alesia Aparicio MD,      Thank you for referring Mr. Ayde Henao to [address] for evaluation. My notes for this consultation are attached. If you have questions, please do not hesitate to call me. I look forward to following your patient along with you.       Sincerely,    Wayne Jackson MD

## 2021-12-08 ENCOUNTER — HOSPITAL ENCOUNTER (OUTPATIENT)
Dept: LAB | Age: 41
Discharge: HOME | End: 2021-12-08

## 2021-12-08 LAB — XX-LABCORP SPECIMEN COL,LCBCF: NORMAL

## 2021-12-08 PROCEDURE — 99001 SPECIMEN HANDLING PT-LAB: CPT
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
## 2022-01-24 ENCOUNTER — OFFICE VISIT (OUTPATIENT)
Dept: ORTHOPEDIC SURGERY | Age: 42
End: 2022-01-24
Payer: COMMERCIAL

## 2022-01-24 VITALS
SYSTOLIC BLOOD PRESSURE: 123 MMHG | HEART RATE: 108 BPM | DIASTOLIC BLOOD PRESSURE: 85 MMHG | OXYGEN SATURATION: 94 % | BODY MASS INDEX: 33.53 KG/M2 | HEIGHT: 69 IN | WEIGHT: 226.4 LBS | TEMPERATURE: 97.8 F

## 2022-01-24 DIAGNOSIS — M54.16 LUMBAR NEURITIS: Primary | ICD-10-CM

## 2022-01-24 DIAGNOSIS — M51.36 DDD (DEGENERATIVE DISC DISEASE), LUMBAR: ICD-10-CM

## 2022-01-24 DIAGNOSIS — M47.16 OSTEOARTHRITIS OF SPINE WITH MYELOPATHY, LUMBOSACRAL REGION: ICD-10-CM

## 2022-01-24 DIAGNOSIS — M47.817 LUMBOSACRAL SPONDYLOSIS WITHOUT MYELOPATHY: ICD-10-CM

## 2022-01-24 DIAGNOSIS — M43.07 SPONDYLOLYSIS, LUMBOSACRAL: ICD-10-CM

## 2022-01-24 PROCEDURE — 99213 OFFICE O/P EST LOW 20 MIN: CPT | Performed by: PHYSICAL MEDICINE & REHABILITATION

## 2022-01-24 NOTE — LETTER
1/24/2022    Patient: Aki Adams   YOB: 1980   Date of Visit: 1/24/2022     Siria Lopez MD  [facility] [address] [place] [address]  [address] [facility] [address]    Dear Siria Lopez MD,      Thank you for referring Mr. Tim Osman to [address] for evaluation. My notes for this consultation are attached. If you have questions, please do not hesitate to call me. I look forward to following your patient along with you.       Sincerely,    Florence Pepe MD

## 2022-02-22 ENCOUNTER — DOCUMENTATION ONLY (OUTPATIENT)
Dept: ORTHOPEDIC SURGERY | Age: 42
End: 2022-02-22

## 2022-02-22 NOTE — PROGRESS NOTES
2/7/22 Medical records [facility] office received fax dated 1/25/22 from [facility] stating that they had tried to contact Dr. Hamilton Otto for a discussion about patients abilities. I sent a fax letting them know our billing and situation for this to be done and have yet to hear back from them. Documents will be scanned to chart and nothing further will be needed until they reach out in the future.

## 2022-03-18 PROBLEM — F33.0 DEPRESSION, MAJOR, RECURRENT, MILD (HCC): Status: ACTIVE | Noted: 2019-10-29

## 2022-07-19 ENCOUNTER — HOSPITAL ENCOUNTER (OUTPATIENT)
Dept: LAB | Age: 42
Discharge: HOME | End: 2022-07-19

## 2022-07-19 LAB — XX-LABCORP SPECIMEN COL,LCBCF: NORMAL

## 2022-07-19 PROCEDURE — 99001 SPECIMEN HANDLING PT-LAB: CPT

## 2022-10-20 ENCOUNTER — OFFICE VISIT (OUTPATIENT)
Dept: FAMILY MEDICINE CLINIC | Age: 42
End: 2022-10-20
Payer: COMMERCIAL

## 2022-10-20 VITALS
DIASTOLIC BLOOD PRESSURE: 82 MMHG | BODY MASS INDEX: 31.45 KG/M2 | OXYGEN SATURATION: 97 % | RESPIRATION RATE: 14 BRPM | SYSTOLIC BLOOD PRESSURE: 120 MMHG | HEART RATE: 88 BPM | WEIGHT: 213 LBS | TEMPERATURE: 97.5 F

## 2022-10-20 DIAGNOSIS — Z00.00 WELL ADULT EXAM: Primary | ICD-10-CM

## 2022-10-20 DIAGNOSIS — F25.1 SCHIZOAFFECTIVE DISORDER, DEPRESSIVE TYPE (HCC): ICD-10-CM

## 2022-10-20 DIAGNOSIS — M47.816 OSTEOARTHRITIS OF LUMBAR SPINE, UNSPECIFIED SPINAL OSTEOARTHRITIS COMPLICATION STATUS: ICD-10-CM

## 2022-10-20 PROBLEM — F33.0 DEPRESSION, MAJOR, RECURRENT, MILD (HCC): Status: RESOLVED | Noted: 2019-10-29 | Resolved: 2022-10-20

## 2022-10-20 PROCEDURE — 99396 PREV VISIT EST AGE 40-64: CPT | Performed by: FAMILY MEDICINE

## 2022-10-20 RX ORDER — ARIPIPRAZOLE 20 MG/1
20 TABLET ORAL
COMMUNITY

## 2022-10-20 NOTE — PROGRESS NOTES
1. \"Have you been to the ER, urgent care clinic since your last visit? Hospitalized since your last visit? \" No    2. \"Have you seen or consulted any other health care providers outside of the [address] [place] [address] since your last visit? \" Yes Where: Dr. Dannie Cornelius- In [place] and Dr. Virginia Henderson      3. For patients aged 39-70: Has the patient had a colonoscopy / FIT/ Cologuard? NA - based on age      If the patient is female:    4. For patients aged 41-77: Has the patient had a mammogram within the past 2 years? NA - based on age or sex      11. For patients aged 21-65: Has the patient had a pap smear?  NA - based on age or sex

## 2022-10-20 NOTE — PROGRESS NOTES
Chief Complaint   Patient presents with    Physical    Results     Subjective:   Tom Angelo is a 40 y.o. male presenting for his annual checkup. ROS:  Feeling well. No dyspnea or chest pain on exertion. No abdominal pain, change in bowel habits, black or bloody stools. No urinary tract or prostatic symptoms. No neurological complaints. Specific concerns today:   Recently in need of psychiatric care for continuity. Unclear as to why their current provider is no longer seeing them. They have been under his care and for counseling for quite some time. He reports well controlled schizoaffective disorder. Had recent labs they want me to review. Seeing pain management for chronic back pains. Current Outpatient Medications   Medication Sig Dispense Refill    ARIPiprazole (Abilify) 20 mg tablet Take 20 mg by mouth nightly. traMADoL (ULTRAM) 50 mg tablet Take 50 mg by mouth every six (6) hours as needed for Pain. DULoxetine (CYMBALTA) 60 mg capsule TAKE 1 CAPSULE BY MOUTH DAILY. START THIS AFTER A 7 DAY COURSE OF CYMBALTA 30 MG DAILY (Patient taking differently: Take 60 mg by mouth two (2) times a day.) 30 Cap 0    traZODone (DESYREL) 50 mg tablet Take 1 Tab by mouth nightly. 30 Tab 5     Allergies   Allergen Reactions    Topiramate Other (comments)     hallucinations     Past Medical History:   Diagnosis Date    Allergic rhinitis     Body mass index (BMI) of 25.0 to 29.9     DJD (degenerative joint disease), cervical 2016    MRI confirmed, foramenal stenosis     History of echocardiogram 07/22/2014    EF 60%. No RWMA. RVSP 25 mmHg. Left lower lobe pneumonia 2013    Sleep apnea     uesd to be on cpap     History reviewed. No pertinent surgical history.   Family History   Problem Relation Age of Onset    Hypertension Maternal Grandmother     Diabetes Maternal Grandmother     Breast Cancer Maternal Grandmother     Other Mother         DVT     Social History     Tobacco Use Smoking status: Former     Packs/day: 0.10     Years: 15.00     Pack years: 1.50     Types: Cigarettes    Smokeless tobacco: Never    Tobacco comments:     2007   Substance Use Topics    Alcohol use: Not Currently      Objective:     Visit Vitals  /82 (BP 1 Location: Right upper arm, BP Patient Position: Sitting, BP Cuff Size: Large adult)   Pulse 88   Temp 97.5 °F (36.4 °C) (Temporal)   Resp 14   Ht (P) 5' 9\" (1.753 m)   Wt 213 lb (96.6 kg)   SpO2 97%   BMI (P) 31.45 kg/m²     The patient appears well, alert, oriented x 3, in no distress. ENT normal.  Neck supple. No adenopathy or thyromegaly. RACHAEL. Lungs are clear, good air entry, no wheezes, rhonchi or rales. S1 and S2 normal, no murmurs, regular rate and rhythm. Abdomen is soft without tenderness, guarding, mass or organomegaly. Extremities show no edema. Neurological is normal without focal findings. Psych: normal affect. Mood good. Oriented x 3. Judgement and insight intact. Assessment/Plan:       ICD-10-CM ICD-9-CM    1. Well adult exam  Z00.00 V70.0       2. Schizoaffective disorder, depressive type ([facility] [address]  F25.1 295.70 REFERRAL TO PSYCHIATRY      3. Osteoarthritis of lumbar spine, unspecified spinal osteoarthritis complication status  O39.446 721.3         Age and sex specific counseling. Pt ed handout. Screening labs reviewed. Schizoaffective d/o - refer to [facility] for telepsych. OA, lumbar - cont per pain management. Declines flu vaccine. All chart history elements were reviewed by me at the time of the visit even though marked at time of note closure. Patient understands our medical plan. Patient has provided input and agrees with goals. Alternatives have been explained and offered. All questions answered. The patient is to call if condition worsens or fails to improve.      Follow-up and Dispositions    Return in about 1 year (around 10/20/2023) for annual physical .

## 2022-10-20 NOTE — PATIENT INSTRUCTIONS
Well Visit, Ages 25 to 72: Care Instructions  Well visits can help you stay healthy. Your doctor has checked your overall health and may have suggested ways to take good care of yourself. Your doctor also may have recommended tests. You can help prevent illness with healthy eating, good sleep, vaccinations, regular exercise, and other steps. Get the tests that you and your doctor decide on. Depending on your age and risks, examples might include screening for diabetes; hepatitis C; HIV; and cervical, breast, lung, and colon cancer. Screening helps find diseases before any symptoms appear. Eat healthy foods. Choose fruits, vegetables, whole grains, lean protein, and low-fat dairy foods. Limit saturated fat and reduce salt. Limit alcohol. Men should have no more than 2 drinks a day. Women should have no more than 1. For some people, no alcohol is the best choice. Exercise. Get at least 30 minutes of exercise on most days of the week. Walking can be a good choice. Reach and stay at your healthy weight. This will lower your risk for many health problems. Take care of your mental health. Try to stay connected with friends, family, and community, and find ways to manage stress. If you're feeling depressed or hopeless, talk to someone. A counselor can help. If you don't have a counselor, talk to your doctor. Talk to your doctor if you think you may have a problem with alcohol or drug use. This includes prescription medicines and illegal drugs. Avoid tobacco and nicotine: Don't smoke, vape, or chew. If you need help quitting, talk to your doctor. Practice safer sex. Getting tested, using condoms or dental dams, and limiting sex partners can help prevent STIs. Use birth control if it's important to you to prevent pregnancy. Talk with your doctor about your choices and what might be best for you. Prevent problems where you can.  Protect your skin from too much sun, wash your hands, brush your teeth twice a day, and wear a seat belt in the car. Where can you learn more? Go to http://www.PayProp.com/  Enter P072 in the search box to learn more about \"Well Visit, Ages 25 to 72: Care Instructions. \"  Current as of: March 9, 2022               Content Version: 13.4  © 1037-8153 Healthwise, Incorporated. Care instructions adapted under license by [employer] (which disclaims liability or warranty for this information). If you have questions about a medical condition or this instruction, always ask your healthcare professional. [place] [address] any warranty or liability for your use of this information.

## 2023-03-23 ENCOUNTER — TELEPHONE (OUTPATIENT)
Age: 43
End: 2023-03-23

## 2023-03-23 ENCOUNTER — TELEMEDICINE (OUTPATIENT)
Facility: CLINIC | Age: 43
End: 2023-03-23
Payer: COMMERCIAL

## 2023-03-23 DIAGNOSIS — B96.89 ACUTE BACTERIAL RHINOSINUSITIS: Primary | ICD-10-CM

## 2023-03-23 DIAGNOSIS — J01.90 ACUTE BACTERIAL RHINOSINUSITIS: Primary | ICD-10-CM
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
## 2023-03-23 PROCEDURE — 99213 OFFICE O/P EST LOW 20 MIN: CPT | Performed by: STUDENT IN AN ORGANIZED HEALTH CARE EDUCATION/TRAINING PROGRAM

## 2023-03-23 RX ORDER — ARIPIPRAZOLE 5 MG/1
TABLET ORAL
COMMUNITY
Start: 2023-03-08

## 2023-03-23 RX ORDER — AMOXICILLIN AND CLAVULANATE POTASSIUM 875; 125 MG/1; MG/1
1 TABLET, FILM COATED ORAL 2 TIMES DAILY
Qty: 14 TABLET | Refills: 0 | Status: SHIPPED | OUTPATIENT
Start: 2023-03-23 | End: 2023-03-30

## 2023-03-23 SDOH — ECONOMIC STABILITY: HOUSING INSECURITY
IN THE LAST 12 MONTHS, WAS THERE A TIME WHEN YOU DID NOT HAVE A STEADY PLACE TO SLEEP OR SLEPT IN A SHELTER (INCLUDING NOW)?: NO

## 2023-03-23 SDOH — ECONOMIC STABILITY: FOOD INSECURITY: WITHIN THE PAST 12 MONTHS, YOU WORRIED THAT YOUR FOOD WOULD RUN OUT BEFORE YOU GOT MONEY TO BUY MORE.: OFTEN TRUE

## 2023-03-23 SDOH — ECONOMIC STABILITY: FOOD INSECURITY: WITHIN THE PAST 12 MONTHS, THE FOOD YOU BOUGHT JUST DIDN'T LAST AND YOU DIDN'T HAVE MONEY TO GET MORE.: SOMETIMES TRUE

## 2023-03-23 SDOH — ECONOMIC STABILITY: INCOME INSECURITY: HOW HARD IS IT FOR YOU TO PAY FOR THE VERY BASICS LIKE FOOD, HOUSING, MEDICAL CARE, AND HEATING?: HARD

## 2023-03-23 SDOH — ECONOMIC STABILITY: TRANSPORTATION INSECURITY
IN THE PAST 12 MONTHS, HAS LACK OF TRANSPORTATION KEPT YOU FROM MEETINGS, WORK, OR FROM GETTING THINGS NEEDED FOR DAILY LIVING?: YES

## 2023-03-23 ASSESSMENT — PATIENT HEALTH QUESTIONNAIRE - PHQ9
4. FEELING TIRED OR HAVING LITTLE ENERGY: 1
6. FEELING BAD ABOUT YOURSELF - OR THAT YOU ARE A FAILURE OR HAVE LET YOURSELF OR YOUR FAMILY DOWN: 3
3. TROUBLE FALLING OR STAYING ASLEEP: 0
8. MOVING OR SPEAKING SO SLOWLY THAT OTHER PEOPLE COULD HAVE NOTICED. OR THE OPPOSITE, BEING SO FIGETY OR RESTLESS THAT YOU HAVE BEEN MOVING AROUND A LOT MORE THAN USUAL: 0
10. IF YOU CHECKED OFF ANY PROBLEMS, HOW DIFFICULT HAVE THESE PROBLEMS MADE IT FOR YOU TO DO YOUR WORK, TAKE CARE OF THINGS AT HOME, OR GET ALONG WITH OTHER PEOPLE: 3
2. FEELING DOWN, DEPRESSED OR HOPELESS: 1
5. POOR APPETITE OR OVEREATING: 2
9. THOUGHTS THAT YOU WOULD BE BETTER OFF DEAD, OR OF HURTING YOURSELF: 0
SUM OF ALL RESPONSES TO PHQ QUESTIONS 1-9: 10
SUM OF ALL RESPONSES TO PHQ QUESTIONS 1-9: 10
SUM OF ALL RESPONSES TO PHQ9 QUESTIONS 1 & 2: 2
SUM OF ALL RESPONSES TO PHQ QUESTIONS 1-9: 10
7. TROUBLE CONCENTRATING ON THINGS, SUCH AS READING THE NEWSPAPER OR WATCHING TELEVISION: 2
1. LITTLE INTEREST OR PLEASURE IN DOING THINGS: 1
SUM OF ALL RESPONSES TO PHQ QUESTIONS 1-9: 10

## 2023-03-23 NOTE — TELEPHONE ENCOUNTER
Patient c/o nose bleed with congestion x 2 days. Upon inquiry he states that he has taken Flonase and Zyrtec without alleviation of symptoms. Mr Jero Ta was scheduled for a [phone] appointment with Dr Nicloasa Pickett at [facility]. He agrees with nurse recommendation and expresses no concerns at this time HIPPA info obtained and verified via phone.

## 2023-03-23 NOTE — PROGRESS NOTES
Shannan Key (: 1980) is a 40 y.o. male, Established patient patient, here for evaluation of the following chief complaint(s):   Sinusitis (Pt states sinus problem for about a week)       Assessment/Plan:  1. Acute bacterial rhinosinusitis-symptoms still progressive at day 5. Likely bacterial sinus infection. We will start 7-day course of Augmentin. Recommend conservative therapies including Mucinex and copious hydration. -     amoxicillin-clavulanate (AUGMENTIN) 875-125 MG per tablet; Take 1 tablet by mouth 2 times daily for 7 days, Disp-14 tablet, R-0Normal    Return if symptoms worsen or fail to improve. Subjective/Objective:  HPI    Upper Respiratory Infection-    Day of illness:  [] 1-2 days [] 3-4 days [x] 5-6 days [] 7-10 days []>10 days    Symptoms present:   [x] Chills  [] Cough-dry [] Cough- prod. [] Diarrhea   [] Ear pressure [] Emesis [] Fever [x] Headache   [] LAD [] Nausea  [] Rash [] Sore throat   [x] Sinus pallavi. [] Vomiting     - Nosebleed      Previous testing:  [] Covid neg  [] Covid pos [] Flu pos [] Flu neg [] Strep pos [] Strep neg    Meds attempted: Zyrtec and flonase        Physical Exam:  No flowsheet data found.     Constitutional: [x] Appears well-developed and well-nourished [x] No apparent distress      [] Abnormal -     Mental status: [x] Alert and awake  [x] Oriented to person/place/time [x] Able to follow commands    [] Abnormal -     Eyes:   EOM    [x]  Normal    [] Abnormal -   Sclera  [x]  Normal    [] Abnormal -          Discharge [x]  None visible   [] Abnormal -     HENT: [x] Normocephalic, atraumatic  [] Abnormal -   [x] Mouth/Throat: Mucous membranes are moist    External Ears [x] Normal  [] Abnormal -    Neck: [x] No visualized mass [] Abnormal -     Pulmonary/Chest: [x] Respiratory effort normal   [x] No visualized signs of difficulty breathing or respiratory distress        [] Abnormal -      Musculoskeletal:   [x] Normal gait with no signs of

## 2023-03-23 NOTE — PROGRESS NOTES
Chief Complaint   Patient presents with    Sinusitis     Pt states sinus problem for about a week     1. \"Have you been to the ER, urgent care clinic since your last visit? Hospitalized since your last visit? \" No    2. \"Have you seen or consulted any other health care providers outside of the [address] [place] [address] since your last visit? \" No     3. For patients aged 39-70: Has the patient had a colonoscopy / FIT/ Cologuard? NA - based on age      If the patient is female:    4. For patients aged 41-77: Has the patient had a mammogram within the past 2 years? NA - based on age or sex      11. For patients aged 21-65: Has the patient had a pap smear?  NA - based on age or sex

## 2023-09-27 ENCOUNTER — TELEPHONE (OUTPATIENT)
Facility: CLINIC | Age: 43
End: 2023-09-27

## 2023-09-27 ENCOUNTER — TELEPHONE (OUTPATIENT)
Age: 43
End: 2023-09-27

## 2023-09-27 NOTE — TELEPHONE ENCOUNTER
Pt states that he has had a cough w/ yellow mucus and congestion for about a week . He would like to know if needs to be seen or if there is anything that can be called in forhim. Please advise.  CVD Robert Solis

## 2023-09-28 NOTE — TELEPHONE ENCOUNTER
Spoke with Sincere Lyons to advise that Dr. Melissa Carcamo would like for him to try over the counter Delsym for his cough and to Schedule CPE for Nov or later in order to stay active as a patient and so we can make sure his cough is gone by then. Per Sincere Lyons he has to have his spouse scheduled because she is his soul transportation.

## 2023-09-28 NOTE — TELEPHONE ENCOUNTER
Patient needs to schedule a CPE to stay active as a patient. Please advise to bring names of any docs he sees and medication bottles for review.

## 2023-09-28 NOTE — TELEPHONE ENCOUNTER
Take over the counter Delsym. Schedule CPE for Nov or later in order to stay active as a patient and so we can make sure his cough is gone by then.

## 2023-09-28 NOTE — TELEPHONE ENCOUNTER
Spoke with Lisa Flannery whom stated that he has already been seen at [facility] and just wanted to what can he take for the cough. He was diagnosis with bronchitis, given an antibiotic but nothing for the cough.  Please advise

## 2023-09-28 NOTE — TELEPHONE ENCOUNTER
On-call note @ [phone]  Pt reported painful cough, and \"spitting up cold mucus. \" Onset: 1 week ago. Denied fever, chills, or shortness of breath. Stated he's been taking OTC medications with no relief. Has not tested for flu or covid. Requesting medication for treatment. Advised pt to go to urgent care for evaluation, and for testing to r/o flu/covid. Patient verbalized understanding, and stated he will be going to [facility].

## 2023-11-15 ENCOUNTER — OFFICE VISIT (OUTPATIENT)
Age: 43
End: 2023-11-15
Payer: COMMERCIAL

## 2023-11-15 VITALS
HEIGHT: 69 IN | DIASTOLIC BLOOD PRESSURE: 80 MMHG | RESPIRATION RATE: 18 BRPM | TEMPERATURE: 98.2 F | WEIGHT: 213 LBS | OXYGEN SATURATION: 98 % | BODY MASS INDEX: 31.55 KG/M2 | SYSTOLIC BLOOD PRESSURE: 106 MMHG | HEART RATE: 78 BPM

## 2023-11-15 DIAGNOSIS — F25.1 SCHIZOAFFECTIVE DISORDER, DEPRESSIVE TYPE (HCC): ICD-10-CM

## 2023-11-15 DIAGNOSIS — G47.33 OBSTRUCTIVE SLEEP APNEA SYNDROME: ICD-10-CM

## 2023-11-15 DIAGNOSIS — G89.29 OTHER CHRONIC PAIN: ICD-10-CM

## 2023-11-15 DIAGNOSIS — M47.816 SPONDYLOSIS WITHOUT MYELOPATHY OR RADICULOPATHY, LUMBAR REGION: ICD-10-CM

## 2023-11-15 DIAGNOSIS — Z13.220 SCREENING CHOLESTEROL LEVEL: ICD-10-CM

## 2023-11-15 DIAGNOSIS — Z00.00 WELL ADULT EXAM: Primary | ICD-10-CM

## 2023-11-15 DIAGNOSIS — E66.9 OBESITY WITHOUT SERIOUS COMORBIDITY, UNSPECIFIED CLASSIFICATION, UNSPECIFIED OBESITY TYPE: ICD-10-CM

## 2023-11-15 PROCEDURE — 99396 PREV VISIT EST AGE 40-64: CPT | Performed by: FAMILY MEDICINE

## 2023-11-15 RX ORDER — PRAZOSIN HYDROCHLORIDE 2 MG/1
2 CAPSULE ORAL
COMMUNITY
Start: 2023-11-02

## 2023-11-15 RX ORDER — ARIPIPRAZOLE 30 MG/1
30 TABLET ORAL DAILY
COMMUNITY
Start: 2023-11-07

## 2023-11-15 ASSESSMENT — COLUMBIA-SUICIDE SEVERITY RATING SCALE - C-SSRS
3. HAVE YOU BEEN THINKING ABOUT HOW YOU MIGHT KILL YOURSELF?: NO
7. DID THIS OCCUR IN THE LAST THREE MONTHS: NO
5. HAVE YOU STARTED TO WORK OUT OR WORKED OUT THE DETAILS OF HOW TO KILL YOURSELF? DO YOU INTEND TO CARRY OUT THIS PLAN?: NO
4. HAVE YOU HAD THESE THOUGHTS AND HAD SOME INTENTION OF ACTING ON THEM?: NO

## 2023-11-15 ASSESSMENT — PATIENT HEALTH QUESTIONNAIRE - PHQ9
SUM OF ALL RESPONSES TO PHQ9 QUESTIONS 1 & 2: 0
SUM OF ALL RESPONSES TO PHQ QUESTIONS 1-9: 3
8. MOVING OR SPEAKING SO SLOWLY THAT OTHER PEOPLE COULD HAVE NOTICED. OR THE OPPOSITE, BEING SO FIGETY OR RESTLESS THAT YOU HAVE BEEN MOVING AROUND A LOT MORE THAN USUAL: 0
SUM OF ALL RESPONSES TO PHQ QUESTIONS 1-9: 3
SUM OF ALL RESPONSES TO PHQ QUESTIONS 1-9: 3
2. FEELING DOWN, DEPRESSED OR HOPELESS: 0
SUM OF ALL RESPONSES TO PHQ QUESTIONS 1-9: 3
6. FEELING BAD ABOUT YOURSELF - OR THAT YOU ARE A FAILURE OR HAVE LET YOURSELF OR YOUR FAMILY DOWN: 1
4. FEELING TIRED OR HAVING LITTLE ENERGY: 0
10. IF YOU CHECKED OFF ANY PROBLEMS, HOW DIFFICULT HAVE THESE PROBLEMS MADE IT FOR YOU TO DO YOUR WORK, TAKE CARE OF THINGS AT HOME, OR GET ALONG WITH OTHER PEOPLE: 1
7. TROUBLE CONCENTRATING ON THINGS, SUCH AS READING THE NEWSPAPER OR WATCHING TELEVISION: 1
3. TROUBLE FALLING OR STAYING ASLEEP: 1
9. THOUGHTS THAT YOU WOULD BE BETTER OFF DEAD, OR OF HURTING YOURSELF: 0
1. LITTLE INTEREST OR PLEASURE IN DOING THINGS: 0
5. POOR APPETITE OR OVEREATING: 0

## 2023-11-15 NOTE — PROGRESS NOTES
Chief Complaint   Patient presents with    Annual Exam      1. \"Have you been to the ER, urgent care clinic since your last visit? Hospitalized since your last visit? \" Yes [facility] on 09/27/2023 for cough / congestion    2. \"Have you seen or consulted any other health care providers outside of the [address] [place] [address] since your last visit? \" Yes under the care of psychiatrist due to diagnosis of   Schizoaffective disorder. 3. For patients aged 43-73: Has the patient had a colonoscopy / FIT/ Cologuard?  NA - based on age
were reviewed by me at the time of the visit even though marked at time of note closure. Patient understands our medical plan. Patient has provided input and agrees with goals. Alternatives have been explained and offered. All questions answered. The patient is to call if condition worsens or fails to improve. Return in about 1 year (around 11/15/2024) for annual physical, fasting labs to be completed at your convenience.

## 2023-11-29 ENCOUNTER — TELEPHONE (OUTPATIENT)
Age: 43
End: 2023-11-29

## 2023-11-29 DIAGNOSIS — G47.33 OBSTRUCTIVE SLEEP APNEA SYNDROME: Primary | ICD-10-CM

## 2023-11-29 NOTE — TELEPHONE ENCOUNTER
Spoke with Gisel Faustin spouse Chano Calvillo) to give Sleep Medicine referral inforamtion [phone].

## 2023-11-29 NOTE — TELEPHONE ENCOUNTER
Patient's wife called and stated that patient does want to pursue referral for sleep studies at this time.  She states Dr. Flores Joseph discussed this with the patient during his last visit and told him to call if he wanted the referral.

## 2023-12-01 ENCOUNTER — HOSPITAL ENCOUNTER (OUTPATIENT)
Facility: HOSPITAL | Age: 43
Discharge: HOME | End: 2023-12-04

## 2023-12-01 LAB — LABCORP SPECIMEN COLLECTION: NORMAL

## 2023-12-02 LAB
ALBUMIN SERPL-MCNC: 4.3 G/DL (ref 4.1–5.1)
ALBUMIN/GLOB SERPL: 1.7 {RATIO} (ref 1.2–2.2)
ALP SERPL-CCNC: 92 IU/L (ref 44–121)
ALT SERPL-CCNC: 49 IU/L (ref 0–44)
AST SERPL-CCNC: 28 IU/L (ref 0–40)
BASOPHILS # BLD AUTO: 0 X10E3/UL (ref 0–0.2)
BASOPHILS NFR BLD AUTO: 1 %
BILIRUB SERPL-MCNC: 0.5 MG/DL (ref 0–1.2)
BUN SERPL-MCNC: 9 MG/DL (ref 6–24)
BUN/CREAT SERPL: 7 (ref 9–20)
CALCIUM SERPL-MCNC: 9 MG/DL (ref 8.7–10.2)
CHLORIDE SERPL-SCNC: 102 MMOL/L (ref 96–106)
CHOLEST SERPL-MCNC: 98 MG/DL (ref 100–199)
CO2 SERPL-SCNC: 27 MMOL/L (ref 20–29)
CREAT SERPL-MCNC: 1.26 MG/DL (ref 0.76–1.27)
EGFRCR SERPLBLD CKD-EPI 2021: 73 ML/MIN/1.73
EOSINOPHIL # BLD AUTO: 0.2 X10E3/UL (ref 0–0.4)
EOSINOPHIL NFR BLD AUTO: 5 %
ERYTHROCYTE [DISTWIDTH] IN BLOOD BY AUTOMATED COUNT: 13.1 % (ref 11.6–15.4)
GLOBULIN SER CALC-MCNC: 2.5 G/DL (ref 1.5–4.5)
GLUCOSE SERPL-MCNC: 94 MG/DL (ref 70–99)
HCT VFR BLD AUTO: 42.3 % (ref 37.5–51)
HDLC SERPL-MCNC: 39 MG/DL
HGB BLD-MCNC: 14 G/DL (ref 13–17.7)
IMM GRANULOCYTES # BLD AUTO: 0 X10E3/UL (ref 0–0.1)
IMM GRANULOCYTES NFR BLD AUTO: 0 %
LDLC SERPL CALC-MCNC: 37 MG/DL (ref 0–99)
LYMPHOCYTES # BLD AUTO: 1.7 X10E3/UL (ref 0.7–3.1)
LYMPHOCYTES NFR BLD AUTO: 43 %
MCH RBC QN AUTO: 29.8 PG (ref 26.6–33)
MCHC RBC AUTO-ENTMCNC: 33.1 G/DL (ref 31.5–35.7)
MCV RBC AUTO: 90 FL (ref 79–97)
MONOCYTES # BLD AUTO: 0.3 X10E3/UL (ref 0.1–0.9)
MONOCYTES NFR BLD AUTO: 8 %
NEUTROPHILS # BLD AUTO: 1.7 X10E3/UL (ref 1.4–7)
NEUTROPHILS NFR BLD AUTO: 43 %
PLATELET # BLD AUTO: 274 X10E3/UL (ref 150–450)
POTASSIUM SERPL-SCNC: 4.2 MMOL/L (ref 3.5–5.2)
PROT SERPL-MCNC: 6.8 G/DL (ref 6–8.5)
RBC # BLD AUTO: 4.7 X10E6/UL (ref 4.14–5.8)
SODIUM SERPL-SCNC: 140 MMOL/L (ref 134–144)
SPECIMEN STATUS REPORT: NORMAL
TRIGL SERPL-MCNC: 125 MG/DL (ref 0–149)
VLDLC SERPL CALC-MCNC: 22 MG/DL (ref 5–40)
WBC # BLD AUTO: 3.9 X10E3/UL (ref 3.4–10.8)

## 2024-04-03 ENCOUNTER — OFFICE VISIT (OUTPATIENT)
Age: 44
End: 2024-04-03
Payer: COMMERCIAL

## 2024-04-03 VITALS
TEMPERATURE: 98 F | SYSTOLIC BLOOD PRESSURE: 125 MMHG | DIASTOLIC BLOOD PRESSURE: 82 MMHG | BODY MASS INDEX: 33.8 KG/M2 | HEART RATE: 99 BPM | HEIGHT: 68 IN | WEIGHT: 223 LBS | RESPIRATION RATE: 18 BRPM | OXYGEN SATURATION: 95 %

## 2024-04-03 DIAGNOSIS — G47.33 OBSTRUCTIVE SLEEP APNEA SYNDROME: Primary | ICD-10-CM

## 2024-04-03 DIAGNOSIS — R06.83 SNORING: ICD-10-CM

## 2024-04-03 DIAGNOSIS — G47.19 EXCESSIVE DAYTIME SLEEPINESS: ICD-10-CM

## 2024-04-03 DIAGNOSIS — G47.00 INSOMNIA W/ SLEEP APNEA: ICD-10-CM

## 2024-04-03 DIAGNOSIS — G47.8 NON-RESTORATIVE SLEEP: ICD-10-CM

## 2024-04-03 DIAGNOSIS — R06.81 WITNESSED APNEIC SPELLS: ICD-10-CM

## 2024-04-03 DIAGNOSIS — G25.81 RESTLESS LEGS SYNDROME (RLS): ICD-10-CM

## 2024-04-03 DIAGNOSIS — G89.4 CHRONIC PAIN SYNDROME: ICD-10-CM

## 2024-04-03 DIAGNOSIS — G47.30 INSOMNIA W/ SLEEP APNEA: ICD-10-CM

## 2024-04-03 DIAGNOSIS — F25.9 SCHIZOAFFECTIVE DISORDER, SUBCHRONIC CONDITION (HCC): ICD-10-CM

## 2024-04-03 PROCEDURE — 99204 OFFICE O/P NEW MOD 45 MIN: CPT | Performed by: OTOLARYNGOLOGY

## 2024-04-03 ASSESSMENT — SLEEP AND FATIGUE QUESTIONNAIRES
HOW LIKELY ARE YOU TO NOD OFF OR FALL ASLEEP WHILE LYING DOWN TO REST IN THE AFTERNOON WHEN CIRCUMSTANCES PERMIT: HIGH CHANCE OF DOZING
HOW LIKELY ARE YOU TO NOD OFF OR FALL ASLEEP WHILE SITTING AND TALKING TO SOMEONE: WOULD NEVER DOZE
HOW LIKELY ARE YOU TO NOD OFF OR FALL ASLEEP WHILE SITTING QUIETLY AFTER LUNCH WITHOUT ALCOHOL: SLIGHT CHANCE OF DOZING
HOW LIKELY ARE YOU TO NOD OFF OR FALL ASLEEP WHILE SITTING AND READING: WOULD NEVER DOZE
HOW LIKELY ARE YOU TO NOD OFF OR FALL ASLEEP WHILE WATCHING TV: MODERATE CHANCE OF DOZING
ESS TOTAL SCORE: 13
HOW LIKELY ARE YOU TO NOD OFF OR FALL ASLEEP WHEN YOU ARE A PASSENGER IN A CAR FOR AN HOUR WITHOUT A BREAK: HIGH CHANCE OF DOZING
NECK CIRCUMFERENCE (INCHES): 15
HOW LIKELY ARE YOU TO NOD OFF OR FALL ASLEEP WHILE SITTING INACTIVE IN A PUBLIC PLACE: SLIGHT CHANCE OF DOZING
HOW LIKELY ARE YOU TO NOD OFF OR FALL ASLEEP IN A CAR, WHILE STOPPED FOR A FEW MINUTES IN TRAFFIC: HIGH CHANCE OF DOZING

## 2024-04-03 ASSESSMENT — PATIENT HEALTH QUESTIONNAIRE - PHQ9
SUM OF ALL RESPONSES TO PHQ QUESTIONS 1-9: 2
2. FEELING DOWN, DEPRESSED OR HOPELESS: MORE THAN HALF THE DAYS
SUM OF ALL RESPONSES TO PHQ QUESTIONS 1-9: 2

## 2024-04-03 NOTE — PATIENT INSTRUCTIONS
Please make a follow up appointment to discuss the results of your sleep study. If this is impossible for some reason, please send me a \"My Chart\" message so that I may get back with you in a timely manner.    The [facility] Sleep Lab is located in the [employer] [facility] adjacent to [facility]. The lab is on the second floor. The direct number to call for sleep study related questions is: [phone].    Please call our clinic back at [phone] or send a message on [employer] if you have any questions or concerns or if you are experiencing any of the following:     You have not received a follow up appointment within 30 days prior the recommended follow up time.    If you are not tolerating treatment plan and/or not able to obtain equipment or prescribed medication(s).  if you are experiencing any difficulties with the Durable Medical Equipment  (DME) Company you may be using or is assigned to you.  Two weeks have passed and you have not received an appointment for a scheduled procedure.  Two weeks have passed since you underwent a test and/or procedure and you have not received your results.     If you are using a CPAP/BIPAP, or Home Ventilator Device- Please note the following.  Currently, many DMEs are experiencing supply chain difficulties and orders for equipment may be back logged several weeks.     Your  Durable Medical Equipment (DME ) company is supposed to provide you with replacement filters, tubing and masks. You can either call your DME when you need new supplies or you can arrange for an automatic shipment schedule.    Your need to be seen by our office at lat minimum of every 12 months in order to renew the prescription for these supplies.   Please make note of who your DME company is and their phone number.   Please make sure that you clean your mask and hosing on a regular basis.  Your DME can provide you with additional information regarding proper care and cleaning of your

## 2024-04-03 NOTE — PROGRESS NOTES
Lexi Diaz presents today for   Chief Complaint   Patient presents with    Sleep Apnea    Snoring       Is someone accompanying this pt? Yes, wife     Is the patient using any DME equipment during OV? no    -DME Company: N/A    Have you ever had a sleep study done before? yes,    Depression Screenin/3/2024     2:37 PM   PHQ-9    Feeling down, depressed, or hopeless 2   PHQ-9 Total Score 2        D[place]line Sleepiness Scale:      4/3/2024     2:39 PM   Sleep Medicine   Sitting and reading 0   Watching TV 2   Sitting, inactive in a public place (e.g. a theatre or a meeting) 1   As a passenger in a car for an hour without a break 3   Lying down to rest in the afternoon when circumstances permit 3   Sitting and talking to someone 0   Sitting quietly after a lunch without alcohol 1   In a car, while stopped for a few minutes in traffic 3   D[place]line Sleepiness Score 13   Neck circumference (Inches) 15       Stop-Ban/3/2024     2:00 PM   STOP-BANG QUESTIONNAIRE   Are you a loud and/or regular snorer? 1   Do you often feel tired or groggy upon awakening or do you awaken with a headache? 1   Have you been observed to gasp or stop breathing during sleep? 1   Are you often tired or fatigued during wake time hours?  0   Do you fall asleep sitting, reading, watching TV or driving? 0   Do you often have problems with memory or concentration? 0   Do you have or are you being treated for high blood pressure? 0   Recent BMI (Calculated) 31.5   Is BMI greater than 35 kg/m2? 0=No   Age older than 50 years old? 0=No   Is your neck circumference greater than 17 inches (Male) or 16 inches (Female)? 0   Gender - Male 1=Yes   STOP-Bang Total Score 35.5         Coordination of Care:  1. Have you been to the ER, urgent care clinic since your last visit? Hospitalized since your last visit? no    2. Have you seen or consulted any other health care providers outside of the [facility] since your last visit? 
Status: He is alert and oriented to person, place, and time.   Psychiatric:         Mood and Affect: Mood normal.         Behavior: Behavior normal.         Data reviewed:    Lab Results   Component Value Date/Time     12/01/2023 12:00 AM    K 4.2 12/01/2023 12:00 AM     12/01/2023 12:00 AM    CO2 27 12/01/2023 12:00 AM    BUN 9 12/01/2023 12:00 AM    CREATININE 1.26 12/01/2023 12:00 AM    GLUCOSE 94 12/01/2023 12:00 AM    CALCIUM 9.0 12/01/2023 12:00 AM    LABGLOM 73 12/01/2023 12:00 AM        Latest Reference Range & Units Most Recent 4/5/22 - 4/3/24   WBC 3.4 - 10.8 x10E3/uL 3.9  12/1/23 00:00   Hemoglobin Quant 13.0 - 17.7 g/dL 14.0  12/1/23 00:00   Hematocrit 37.5 - 51.0 % 42.3  12/1/23 00:00   Platelet Count 150 - 450 x10E3/uL 274  12/1/23 00:00   HDL Cholesterol >39 mg/dL 39 (L)  12/1/23 00:00   LDL Calculated 0 - 99 mg/dL 37  12/1/23 00:00   Triglycerides 0 - 149 mg/dL 125  12/1/23 00:00   (L): Data is abnormally low  Historical Sleep Testing Data:   Actual report not available for review. This is an excerpt from office notes:  Dr. Huerta 5/2014  Moderate Obstructive Sleep Apnea  AHI 22  O2 valencia: 84%    Titration study done as well      Total time spent on this encounter, including previsit review of of separately obtained history, face to face interaction, performing medically appropriate physical exam, patient/counseling, ordering tests, care coordination and documentation was  40 minutes.  I researched and found a summary of his initial sleep study from 2016 and discussed that with the patient and his wife today.  I also reviewed recent labs, reviewed referring provider's notes, spent 30 minutes with the patient and his wife and ordered a sleep study.    This note was dictated utilizing voice recognition software which may lead to typographical errors.  I apologize in advance if the situation occurs.  If questions arise please do not hesitate to contact me or call our

## 2024-04-03 NOTE — ASSESSMENT & PLAN NOTE
Possible, unclear based on description today but will follow-up on this when he returns after treating his obstructive sleep apnea.

## 2024-04-03 NOTE — ASSESSMENT & PLAN NOTE
Monitored by specialist- no acute findings meriting change in the plan, sees his psychiatrist once a month and his therapist once a week.  Takes Abilify, trazodone, Minipress, Cymbalta.  Also taking Ultram daily for chronic back pain

## 2024-04-03 NOTE — ASSESSMENT & PLAN NOTE
Monitored by specialist- no acute findings meriting change in the plan, takes Ultram daily. Sees pain management.

## 2024-04-03 NOTE — ASSESSMENT & PLAN NOTE
Unclear control, continue current plan pending work up below, had sleep study in 2014, AHI reportedly 22. Do not have actual study to review today, only summary.  He did have a CPAP machine and used it for \"some time\" but stopped using it and does not remember why.  He has ongoing and worsening symptoms of obstructive sleep apnea and excessive daytime somnolence.

## 2024-04-24 ENCOUNTER — HOSPITAL ENCOUNTER (OUTPATIENT)
Dept: SLEEP MEDICINE | Facility: HOSPITAL | Age: 44
Discharge: HOME | End: 2024-04-27
Attending: OTOLARYNGOLOGY
Payer: COMMERCIAL

## 2024-04-24 DIAGNOSIS — G47.33 OBSTRUCTIVE SLEEP APNEA SYNDROME: ICD-10-CM

## 2024-04-24 DIAGNOSIS — G47.19 EXCESSIVE DAYTIME SLEEPINESS: ICD-10-CM

## 2024-04-24 PROCEDURE — 95810 POLYSOM 6/> YRS 4/> PARAM: CPT

## 2024-04-25 VITALS
SYSTOLIC BLOOD PRESSURE: 124 MMHG | BODY MASS INDEX: 32.77 KG/M2 | HEART RATE: 92 BPM | HEIGHT: 69 IN | DIASTOLIC BLOOD PRESSURE: 94 MMHG | WEIGHT: 221.25 LBS

## 2024-04-25 ASSESSMENT — SLEEP AND FATIGUE QUESTIONNAIRES
HOW LIKELY ARE YOU TO NOD OFF OR FALL ASLEEP WHILE SITTING AND READING: HIGH CHANCE OF DOZING
HOW LIKELY ARE YOU TO NOD OFF OR FALL ASLEEP WHILE LYING DOWN TO REST IN THE AFTERNOON WHEN CIRCUMSTANCES PERMIT: HIGH CHANCE OF DOZING
HOW LIKELY ARE YOU TO NOD OFF OR FALL ASLEEP IN A CAR, WHILE STOPPED FOR A FEW MINUTES IN TRAFFIC: SLIGHT CHANCE OF DOZING
HOW LIKELY ARE YOU TO NOD OFF OR FALL ASLEEP WHEN YOU ARE A PASSENGER IN A CAR FOR AN HOUR WITHOUT A BREAK: HIGH CHANCE OF DOZING
HOW LIKELY ARE YOU TO NOD OFF OR FALL ASLEEP WHILE WATCHING TV: MODERATE CHANCE OF DOZING
ESS TOTAL SCORE: 15
HOW LIKELY ARE YOU TO NOD OFF OR FALL ASLEEP WHILE SITTING AND TALKING TO SOMEONE: SLIGHT CHANCE OF DOZING
HOW LIKELY ARE YOU TO NOD OFF OR FALL ASLEEP WHILE SITTING INACTIVE IN A PUBLIC PLACE: MODERATE CHANCE OF DOZING
HOW LIKELY ARE YOU TO NOD OFF OR FALL ASLEEP WHILE SITTING QUIETLY AFTER LUNCH WITHOUT ALCOHOL: WOULD NEVER DOZE

## 2024-04-25 NOTE — PROGRESS NOTES
Pt arrived for sleep study.   Physicians orders were reviewed.  Pt education to include initiation of PAP therapy per protocol.  Pt questions were answered.  Pt demonstrated good understanding of the positive airway device

## 2024-04-25 NOTE — PROGRESS NOTES
Sleep study completed per physician order.  Pt discharged from sleep center.  Pt awake, alert and able to leave the facility under their own power.  Instructed to follow up with their sleep physician for results.

## 2024-11-19 NOTE — PATIENT INSTRUCTIONS
[employer] disclaims any warranty or liability for your use of this information.       Patient Education        Well Visit, Ages 18 to 65: Care Instructions  Well visits can help you stay healthy. Your doctor has checked your overall health and may have suggested ways to take good care of yourself. Your doctor also may have recommended tests. You can help prevent illness with healthy eating, good sleep, vaccinations, regular exercise, and other steps.    Get the tests that you and your doctor decide on. Depending on your age and risks, examples might include screening for diabetes; hepatitis C; HIV; and cervical, breast, lung, and colon cancer. Screening helps find diseases before any symptoms appear.   Eat healthy foods. Choose fruits, vegetables, whole grains, lean protein, and low-fat dairy foods. Limit saturated fat and reduce salt.     Limit alcohol. Men should have no more than 2 drinks a day. Women should have no more than 1. For some people, no alcohol is the best choice.   Exercise. Get at least 30 minutes of exercise on most days of the week. Walking can be a good choice.     Reach and stay at your healthy weight. This will lower your risk for many health problems.   Take care of your mental health. Try to stay connected with friends, family, and community, and find ways to manage stress.     If you're feeling depressed or hopeless, talk to someone. A counselor can help. If you don't have a counselor, talk to your doctor.   Talk to your doctor if you think you may have a problem with alcohol or drug use. This includes prescription medicines, marijuana, and other drugs.     Avoid tobacco and nicotine: Don't smoke, vape, or chew. If you need help quitting, talk to your doctor.   Practice safer sex. Getting tested, using condoms or dental dams, and limiting sex partners can help prevent STIs.     Use birth control if it's important to you to prevent pregnancy. Talk with your doctor about your

## 2024-11-19 NOTE — PROGRESS NOTES
Chief Complaint   Patient presents with    Annual Exam      \"Have you been to the ER, urgent care clinic since your last visit?  Hospitalized since your last visit?\"    NO    “Have you seen or consulted any other health care providers outside our system since your last visit?”    YES - When: approximately 1 months ago.  Where and Why: 10/23/2024 [facility] depression and schizoaffective disorder.        No updated immunization noted on [place] Immunization Registry as of 11/19/2024

## 2024-11-20 ENCOUNTER — OFFICE VISIT (OUTPATIENT)
Facility: CLINIC | Age: 44
End: 2024-11-20
Payer: COMMERCIAL

## 2024-11-20 VITALS
BODY MASS INDEX: 32.88 KG/M2 | WEIGHT: 222 LBS | HEIGHT: 69 IN | OXYGEN SATURATION: 98 % | SYSTOLIC BLOOD PRESSURE: 120 MMHG | RESPIRATION RATE: 18 BRPM | TEMPERATURE: 98 F | HEART RATE: 74 BPM | DIASTOLIC BLOOD PRESSURE: 80 MMHG

## 2024-11-20 DIAGNOSIS — Z00.00 ENCOUNTER FOR WELL ADULT EXAM WITHOUT ABNORMAL FINDINGS: Primary | ICD-10-CM

## 2024-11-20 DIAGNOSIS — M47.816 SPONDYLOSIS WITHOUT MYELOPATHY OR RADICULOPATHY, LUMBAR REGION: ICD-10-CM

## 2024-11-20 DIAGNOSIS — Z13.220 SCREENING CHOLESTEROL LEVEL: ICD-10-CM

## 2024-11-20 DIAGNOSIS — F25.1 SCHIZOAFFECTIVE DISORDER, DEPRESSIVE TYPE (HCC): ICD-10-CM

## 2024-11-20 DIAGNOSIS — G89.29 OTHER CHRONIC PAIN: ICD-10-CM

## 2024-11-20 DIAGNOSIS — G47.33 OBSTRUCTIVE SLEEP APNEA SYNDROME: ICD-10-CM

## 2024-11-20 PROCEDURE — 99396 PREV VISIT EST AGE 40-64: CPT | Performed by: FAMILY MEDICINE

## 2024-11-20 SDOH — ECONOMIC STABILITY: FOOD INSECURITY: WITHIN THE PAST 12 MONTHS, YOU WORRIED THAT YOUR FOOD WOULD RUN OUT BEFORE YOU GOT MONEY TO BUY MORE.: NEVER TRUE

## 2024-11-20 SDOH — ECONOMIC STABILITY: FOOD INSECURITY: WITHIN THE PAST 12 MONTHS, THE FOOD YOU BOUGHT JUST DIDN'T LAST AND YOU DIDN'T HAVE MONEY TO GET MORE.: NEVER TRUE

## 2024-11-20 SDOH — ECONOMIC STABILITY: INCOME INSECURITY: HOW HARD IS IT FOR YOU TO PAY FOR THE VERY BASICS LIKE FOOD, HOUSING, MEDICAL CARE, AND HEATING?: NOT VERY HARD

## 2024-11-20 ASSESSMENT — PATIENT HEALTH QUESTIONNAIRE - PHQ9
6. FEELING BAD ABOUT YOURSELF - OR THAT YOU ARE A FAILURE OR HAVE LET YOURSELF OR YOUR FAMILY DOWN: SEVERAL DAYS
2. FEELING DOWN, DEPRESSED OR HOPELESS: NOT AT ALL
9. THOUGHTS THAT YOU WOULD BE BETTER OFF DEAD, OR OF HURTING YOURSELF: NOT AT ALL
SUM OF ALL RESPONSES TO PHQ QUESTIONS 1-9: 2
7. TROUBLE CONCENTRATING ON THINGS, SUCH AS READING THE NEWSPAPER OR WATCHING TELEVISION: SEVERAL DAYS
SUM OF ALL RESPONSES TO PHQ QUESTIONS 1-9: 2
1. LITTLE INTEREST OR PLEASURE IN DOING THINGS: NOT AT ALL
SUM OF ALL RESPONSES TO PHQ QUESTIONS 1-9: 2
4. FEELING TIRED OR HAVING LITTLE ENERGY: NOT AT ALL
SUM OF ALL RESPONSES TO PHQ QUESTIONS 1-9: 2
5. POOR APPETITE OR OVEREATING: NOT AT ALL
SUM OF ALL RESPONSES TO PHQ9 QUESTIONS 1 & 2: 0
3. TROUBLE FALLING OR STAYING ASLEEP: NOT AT ALL
10. IF YOU CHECKED OFF ANY PROBLEMS, HOW DIFFICULT HAVE THESE PROBLEMS MADE IT FOR YOU TO DO YOUR WORK, TAKE CARE OF THINGS AT HOME, OR GET ALONG WITH OTHER PEOPLE: SOMEWHAT DIFFICULT

## 2024-11-20 NOTE — PROGRESS NOTES
Chief Complaint   Patient presents with    Annual Exam       Well Adult Note  Name: Lexi Diaz Today’s Date: 2024   MRN: [id number] Sex: Male   Age: 44 y.o. Ethnicity:  /    : 1980 Race: Black /       Lexi Diaz is here for a well adult exam.       Subjective   History:  No new complaints.       Mental health in a good place under care of [facility] telepsych.      Review of Systems - neg    Allergies   Allergen Reactions    Topiramate Other (See Comments)     hallucinations     Prior to Visit Medications    Medication Sig Taking? Authorizing Provider   prazosin (MINIPRESS) 2 MG capsule Take 1 capsule by mouth nightly Yes Provider, Madelin, MD   ARIPiprazole (ABILIFY) 30 MG tablet Take 1 tablet by mouth daily Yes Provider, MD Madelin   DULoxetine (CYMBALTA) 60 MG extended release capsule TAKE 1 CAPSULE BY MOUTH DAILY. START THIS AFTER A 7 DAY COURSE OF CYMBALTA 30 MG DAILY Yes Automatic Reconciliation, Ar   traMADol (ULTRAM) 50 MG tablet Take 1 tablet by mouth every 6 hours as needed. Yes Automatic Reconciliation, Ar   traZODone (DESYREL) 50 MG tablet Take 1 tablet by mouth Yes Automatic Reconciliation, Ar     Past Medical History:   Diagnosis Date    Allergic rhinitis     DJD (degenerative joint disease), cervical 2016    MRI confirmed, foramenal stenosis     History of echocardiogram 2014    EF 60%.  No RWMA.  RVSP 25 mmHg.      Left lower lobe pneumonia     Schizoaffective disorder (HCC)     Sleep apnea     uesd to be on cpap     History reviewed. No pertinent surgical history.  Family History   Problem Relation Age of Onset    Other Mother         DVT    Breast Cancer Maternal Grandmother     Diabetes Maternal Grandmother     Hypertension Maternal Grandmother      Social History     Tobacco Use    Smoking status: Former     Current packs/day: 0.10     Average packs/day: 0.1 packs/day for 8.0 years (0.8 ttl pk-yrs)     Types: Cigarettes

## 2024-12-17 ENCOUNTER — HOSPITAL ENCOUNTER (OUTPATIENT)
Facility: HOSPITAL | Age: 44
Setting detail: SPECIMEN
Discharge: HOME | End: 2024-12-20

## 2024-12-17 DIAGNOSIS — Z13.220 SCREENING CHOLESTEROL LEVEL: ICD-10-CM

## 2024-12-17 DIAGNOSIS — Z00.00 ENCOUNTER FOR WELL ADULT EXAM WITHOUT ABNORMAL FINDINGS: ICD-10-CM

## 2024-12-17 LAB — LABCORP SPECIMEN COLLECTION: NORMAL

## 2024-12-17 PROCEDURE — 99001 SPECIMEN HANDLING PT-LAB: CPT

## 2024-12-18 LAB
ALBUMIN SERPL-MCNC: 4.5 G/DL (ref 4.1–5.1)
ALP SERPL-CCNC: 104 IU/L (ref 44–121)
ALT SERPL-CCNC: 41 IU/L (ref 0–44)
AST SERPL-CCNC: 23 IU/L (ref 0–40)
BASOPHILS # BLD AUTO: 0.1 X10E3/UL (ref 0–0.2)
BASOPHILS NFR BLD AUTO: 1 %
BILIRUB SERPL-MCNC: 0.5 MG/DL (ref 0–1.2)
BUN SERPL-MCNC: 9 MG/DL (ref 6–24)
BUN/CREAT SERPL: 7 (ref 9–20)
CALCIUM SERPL-MCNC: 9.1 MG/DL (ref 8.7–10.2)
CHLORIDE SERPL-SCNC: 103 MMOL/L (ref 96–106)
CHOLEST SERPL-MCNC: 95 MG/DL (ref 100–199)
CO2 SERPL-SCNC: 24 MMOL/L (ref 20–29)
CREAT SERPL-MCNC: 1.24 MG/DL (ref 0.76–1.27)
EGFRCR SERPLBLD CKD-EPI 2021: 74 ML/MIN/1.73
EOSINOPHIL # BLD AUTO: 0.4 X10E3/UL (ref 0–0.4)
EOSINOPHIL NFR BLD AUTO: 7 %
ERYTHROCYTE [DISTWIDTH] IN BLOOD BY AUTOMATED COUNT: 13.5 % (ref 11.6–15.4)
GLOBULIN SER CALC-MCNC: 2.6 G/DL (ref 1.5–4.5)
GLUCOSE SERPL-MCNC: 85 MG/DL (ref 70–99)
HCT VFR BLD AUTO: 43.7 % (ref 37.5–51)
HDLC SERPL-MCNC: 37 MG/DL
HGB BLD-MCNC: 14.4 G/DL (ref 13–17.7)
IMM GRANULOCYTES # BLD AUTO: 0 X10E3/UL (ref 0–0.1)
IMM GRANULOCYTES NFR BLD AUTO: 0 %
LDLC SERPL CALC-MCNC: 36 MG/DL (ref 0–99)
LYMPHOCYTES # BLD AUTO: 2 X10E3/UL (ref 0.7–3.1)
LYMPHOCYTES NFR BLD AUTO: 39 %
MCH RBC QN AUTO: 30.3 PG (ref 26.6–33)
MCHC RBC AUTO-ENTMCNC: 33 G/DL (ref 31.5–35.7)
MCV RBC AUTO: 92 FL (ref 79–97)
MONOCYTES # BLD AUTO: 0.3 X10E3/UL (ref 0.1–0.9)
MONOCYTES NFR BLD AUTO: 7 %
NEUTROPHILS # BLD AUTO: 2.3 X10E3/UL (ref 1.4–7)
NEUTROPHILS NFR BLD AUTO: 46 %
PLATELET # BLD AUTO: 288 X10E3/UL (ref 150–450)
POTASSIUM SERPL-SCNC: 4.5 MMOL/L (ref 3.5–5.2)
PROT SERPL-MCNC: 7.1 G/DL (ref 6–8.5)
RBC # BLD AUTO: 4.75 X10E6/UL (ref 4.14–5.8)
SODIUM SERPL-SCNC: 143 MMOL/L (ref 134–144)
SPECIMEN STATUS REPORT: NORMAL
TRIGL SERPL-MCNC: 122 MG/DL (ref 0–149)
VLDLC SERPL CALC-MCNC: 22 MG/DL (ref 5–40)
WBC # BLD AUTO: 5 X10E3/UL (ref 3.4–10.8)